# Patient Record
Sex: FEMALE | Race: WHITE | NOT HISPANIC OR LATINO | URBAN - METROPOLITAN AREA
[De-identification: names, ages, dates, MRNs, and addresses within clinical notes are randomized per-mention and may not be internally consistent; named-entity substitution may affect disease eponyms.]

---

## 2018-05-25 ENCOUNTER — HOSPITAL ENCOUNTER (OUTPATIENT)
Dept: RADIOLOGY | Facility: MEDICAL CENTER | Age: 58
End: 2018-05-25

## 2018-05-31 ENCOUNTER — APPOINTMENT (OUTPATIENT)
Dept: PHYSICAL MEDICINE AND REHAB | Facility: MEDICAL CENTER | Age: 58
End: 2018-05-31
Payer: MEDICAID

## 2018-06-14 ENCOUNTER — APPOINTMENT (OUTPATIENT)
Dept: PHYSICAL MEDICINE AND REHAB | Facility: MEDICAL CENTER | Age: 58
End: 2018-06-14
Payer: MEDICAID

## 2022-01-01 ENCOUNTER — PATIENT OUTREACH (OUTPATIENT)
Dept: ONCOLOGY | Facility: MEDICAL CENTER | Age: 62
End: 2022-01-01
Payer: MEDICAID

## 2022-01-01 ENCOUNTER — OUTPATIENT INFUSION SERVICES (OUTPATIENT)
Dept: ONCOLOGY | Facility: MEDICAL CENTER | Age: 62
End: 2022-01-01
Attending: INTERNAL MEDICINE
Payer: MEDICAID

## 2022-01-01 ENCOUNTER — HOSPITAL ENCOUNTER (OUTPATIENT)
Dept: RADIATION ONCOLOGY | Facility: MEDICAL CENTER | Age: 62
End: 2022-09-23
Payer: MEDICAID

## 2022-01-01 ENCOUNTER — HOSPITAL ENCOUNTER (OUTPATIENT)
Dept: RADIATION ONCOLOGY | Facility: MEDICAL CENTER | Age: 62
End: 2022-09-28
Payer: MEDICAID

## 2022-01-01 ENCOUNTER — TELEPHONE (OUTPATIENT)
Dept: OTHER | Facility: MEDICAL CENTER | Age: 62
End: 2022-01-01
Payer: MEDICAID

## 2022-01-01 ENCOUNTER — HOSPITAL ENCOUNTER (OUTPATIENT)
Facility: MEDICAL CENTER | Age: 62
End: 2022-08-08
Attending: INTERNAL MEDICINE | Admitting: INTERNAL MEDICINE
Payer: MEDICAID

## 2022-01-01 ENCOUNTER — PHARMACY VISIT (OUTPATIENT)
Dept: PHARMACY | Facility: MEDICAL CENTER | Age: 62
End: 2022-01-01
Payer: COMMERCIAL

## 2022-01-01 ENCOUNTER — DOCUMENTATION (OUTPATIENT)
Dept: RADIATION ONCOLOGY | Facility: MEDICAL CENTER | Age: 62
End: 2022-01-01
Payer: MEDICAID

## 2022-01-01 ENCOUNTER — HOSPITAL ENCOUNTER (OUTPATIENT)
Dept: RADIATION ONCOLOGY | Facility: MEDICAL CENTER | Age: 62
End: 2022-09-26

## 2022-01-01 ENCOUNTER — HOSPITAL ENCOUNTER (OUTPATIENT)
Dept: RADIATION ONCOLOGY | Facility: MEDICAL CENTER | Age: 62
End: 2022-09-30
Attending: RADIOLOGY
Payer: MEDICAID

## 2022-01-01 ENCOUNTER — TELEPHONE (OUTPATIENT)
Dept: OTHER | Facility: MEDICAL CENTER | Age: 62
End: 2022-01-01

## 2022-01-01 ENCOUNTER — HOSPITAL ENCOUNTER (INPATIENT)
Facility: MEDICAL CENTER | Age: 62
LOS: 7 days | DRG: 175 | End: 2022-10-10
Attending: EMERGENCY MEDICINE | Admitting: INTERNAL MEDICINE
Payer: MEDICAID

## 2022-01-01 ENCOUNTER — HOSPITAL ENCOUNTER (OUTPATIENT)
Dept: RADIATION ONCOLOGY | Facility: MEDICAL CENTER | Age: 62
End: 2022-09-15
Payer: MEDICAID

## 2022-01-01 ENCOUNTER — APPOINTMENT (OUTPATIENT)
Dept: RADIOLOGY | Facility: MEDICAL CENTER | Age: 62
DRG: 180 | End: 2022-01-01
Attending: INTERNAL MEDICINE
Payer: MEDICAID

## 2022-01-01 ENCOUNTER — HOSPITAL ENCOUNTER (OUTPATIENT)
Dept: RADIOLOGY | Facility: MEDICAL CENTER | Age: 62
End: 2022-08-06

## 2022-01-01 ENCOUNTER — HOSPITAL ENCOUNTER (OUTPATIENT)
Dept: RADIATION ONCOLOGY | Facility: MEDICAL CENTER | Age: 62
End: 2022-10-10

## 2022-01-01 ENCOUNTER — DOCUMENTATION (OUTPATIENT)
Dept: ONCOLOGY | Facility: MEDICAL CENTER | Age: 62
End: 2022-01-01
Payer: MEDICAID

## 2022-01-01 ENCOUNTER — HOSPITAL ENCOUNTER (OUTPATIENT)
Dept: RADIATION ONCOLOGY | Facility: MEDICAL CENTER | Age: 62
End: 2022-09-02
Payer: MEDICAID

## 2022-01-01 ENCOUNTER — PATIENT OUTREACH (OUTPATIENT)
Dept: SCHEDULING | Facility: IMAGING CENTER | Age: 62
End: 2022-01-01
Payer: MEDICAID

## 2022-01-01 ENCOUNTER — HOSPITAL ENCOUNTER (OUTPATIENT)
Dept: RADIATION ONCOLOGY | Facility: MEDICAL CENTER | Age: 62
End: 2022-09-29
Payer: MEDICAID

## 2022-01-01 ENCOUNTER — HOSPITAL ENCOUNTER (INPATIENT)
Facility: MEDICAL CENTER | Age: 62
LOS: 4 days | DRG: 193 | End: 2022-08-16
Attending: INTERNAL MEDICINE | Admitting: STUDENT IN AN ORGANIZED HEALTH CARE EDUCATION/TRAINING PROGRAM
Payer: MEDICAID

## 2022-01-01 ENCOUNTER — APPOINTMENT (OUTPATIENT)
Dept: RADIATION ONCOLOGY | Facility: MEDICAL CENTER | Age: 62
End: 2022-01-01
Attending: RADIOLOGY
Payer: MEDICAID

## 2022-01-01 ENCOUNTER — HOSPITAL ENCOUNTER (OUTPATIENT)
Dept: RADIATION ONCOLOGY | Facility: MEDICAL CENTER | Age: 62
End: 2022-09-06
Payer: MEDICAID

## 2022-01-01 ENCOUNTER — HOSPITAL ENCOUNTER (OUTPATIENT)
Dept: RADIATION ONCOLOGY | Facility: MEDICAL CENTER | Age: 62
End: 2022-08-30
Payer: MEDICAID

## 2022-01-01 ENCOUNTER — ANESTHESIA EVENT (OUTPATIENT)
Dept: SURGERY | Facility: MEDICAL CENTER | Age: 62
DRG: 180 | End: 2022-01-01
Payer: MEDICAID

## 2022-01-01 ENCOUNTER — HOSPITAL ENCOUNTER (OUTPATIENT)
Dept: RADIATION ONCOLOGY | Facility: MEDICAL CENTER | Age: 62
End: 2022-09-16
Payer: MEDICAID

## 2022-01-01 ENCOUNTER — HOSPITAL ENCOUNTER (OUTPATIENT)
Dept: RADIATION ONCOLOGY | Facility: MEDICAL CENTER | Age: 62
End: 2022-08-31
Attending: RADIOLOGY
Payer: MEDICAID

## 2022-01-01 ENCOUNTER — HOSPITAL ENCOUNTER (OUTPATIENT)
Dept: RADIATION ONCOLOGY | Facility: MEDICAL CENTER | Age: 62
End: 2022-08-31

## 2022-01-01 ENCOUNTER — APPOINTMENT (OUTPATIENT)
Dept: RADIOLOGY | Facility: MEDICAL CENTER | Age: 62
DRG: 175 | End: 2022-01-01
Attending: EMERGENCY MEDICINE
Payer: MEDICAID

## 2022-01-01 ENCOUNTER — HOSPITAL ENCOUNTER (OUTPATIENT)
Dept: RADIATION ONCOLOGY | Facility: MEDICAL CENTER | Age: 62
End: 2022-09-21
Payer: MEDICAID

## 2022-01-01 ENCOUNTER — HOSPITAL ENCOUNTER (EMERGENCY)
Facility: MEDICAL CENTER | Age: 62
End: 2022-01-01
Payer: MEDICAID

## 2022-01-01 ENCOUNTER — APPOINTMENT (OUTPATIENT)
Dept: RADIOLOGY | Facility: MEDICAL CENTER | Age: 62
DRG: 175 | End: 2022-01-01
Attending: INTERNAL MEDICINE
Payer: MEDICAID

## 2022-01-01 ENCOUNTER — APPOINTMENT (OUTPATIENT)
Dept: RADIOLOGY | Facility: MEDICAL CENTER | Age: 62
End: 2022-01-01
Attending: INTERNAL MEDICINE
Payer: MEDICAID

## 2022-01-01 ENCOUNTER — HOSPITAL ENCOUNTER (OUTPATIENT)
Dept: RADIATION ONCOLOGY | Facility: MEDICAL CENTER | Age: 62
End: 2022-08-29

## 2022-01-01 ENCOUNTER — HOSPITAL ENCOUNTER (OUTPATIENT)
Dept: RADIATION ONCOLOGY | Facility: MEDICAL CENTER | Age: 62
End: 2022-10-05

## 2022-01-01 ENCOUNTER — HOSPITAL ENCOUNTER (OUTPATIENT)
Dept: RADIATION ONCOLOGY | Facility: MEDICAL CENTER | Age: 62
End: 2022-09-30
Payer: MEDICAID

## 2022-01-01 ENCOUNTER — APPOINTMENT (OUTPATIENT)
Dept: RADIOLOGY | Facility: MEDICAL CENTER | Age: 62
DRG: 193 | End: 2022-01-01
Attending: INTERNAL MEDICINE
Payer: MEDICAID

## 2022-01-01 ENCOUNTER — HOSPITAL ENCOUNTER (OUTPATIENT)
Dept: RADIATION ONCOLOGY | Facility: MEDICAL CENTER | Age: 62
End: 2022-09-19

## 2022-01-01 ENCOUNTER — APPOINTMENT (OUTPATIENT)
Dept: RADIOLOGY | Facility: MEDICAL CENTER | Age: 62
DRG: 180 | End: 2022-01-01
Attending: FAMILY MEDICINE
Payer: MEDICAID

## 2022-01-01 ENCOUNTER — HOSPITAL ENCOUNTER (OUTPATIENT)
Dept: RADIATION ONCOLOGY | Facility: MEDICAL CENTER | Age: 62
End: 2022-09-08
Payer: MEDICAID

## 2022-01-01 ENCOUNTER — HOSPITAL ENCOUNTER (OUTPATIENT)
Dept: RADIATION ONCOLOGY | Facility: MEDICAL CENTER | Age: 62
End: 2022-09-20
Payer: MEDICAID

## 2022-01-01 ENCOUNTER — TELEPHONE (OUTPATIENT)
Dept: SLEEP MEDICINE | Facility: MEDICAL CENTER | Age: 62
End: 2022-01-01
Payer: MEDICAID

## 2022-01-01 ENCOUNTER — HOSPITAL ENCOUNTER (OUTPATIENT)
Dept: RADIATION ONCOLOGY | Facility: MEDICAL CENTER | Age: 62
End: 2022-09-01

## 2022-01-01 ENCOUNTER — HOSPITAL ENCOUNTER (INPATIENT)
Facility: MEDICAL CENTER | Age: 62
LOS: 4 days | DRG: 180 | End: 2022-08-12
Attending: INTERNAL MEDICINE | Admitting: FAMILY MEDICINE
Payer: MEDICAID

## 2022-01-01 ENCOUNTER — HOSPITAL ENCOUNTER (OUTPATIENT)
Dept: RADIATION ONCOLOGY | Facility: MEDICAL CENTER | Age: 62
End: 2022-09-09
Payer: MEDICAID

## 2022-01-01 ENCOUNTER — APPOINTMENT (OUTPATIENT)
Dept: CARDIOLOGY | Facility: MEDICAL CENTER | Age: 62
DRG: 180 | End: 2022-01-01
Attending: INTERNAL MEDICINE
Payer: MEDICAID

## 2022-01-01 ENCOUNTER — HOSPITAL ENCOUNTER (OUTPATIENT)
Dept: RADIATION ONCOLOGY | Facility: MEDICAL CENTER | Age: 62
End: 2022-09-06

## 2022-01-01 ENCOUNTER — HOSPITAL ENCOUNTER (OUTPATIENT)
Dept: RADIATION ONCOLOGY | Facility: MEDICAL CENTER | Age: 62
End: 2022-10-03

## 2022-01-01 ENCOUNTER — HOSPITAL ENCOUNTER (OUTPATIENT)
Dept: RADIATION ONCOLOGY | Facility: MEDICAL CENTER | Age: 62
End: 2022-09-14
Payer: MEDICAID

## 2022-01-01 ENCOUNTER — PATIENT OUTREACH (OUTPATIENT)
Dept: ONCOLOGY | Facility: MEDICAL CENTER | Age: 62
End: 2022-01-01

## 2022-01-01 ENCOUNTER — HOSPITAL ENCOUNTER (OUTPATIENT)
Dept: RADIATION ONCOLOGY | Facility: MEDICAL CENTER | Age: 62
End: 2022-09-07
Payer: MEDICAID

## 2022-01-01 ENCOUNTER — ANESTHESIA (OUTPATIENT)
Dept: SURGERY | Facility: MEDICAL CENTER | Age: 62
DRG: 180 | End: 2022-01-01
Payer: MEDICAID

## 2022-01-01 ENCOUNTER — HOSPITAL ENCOUNTER (OUTPATIENT)
Dept: RADIATION ONCOLOGY | Facility: MEDICAL CENTER | Age: 62
End: 2022-09-13
Payer: MEDICAID

## 2022-01-01 ENCOUNTER — HOSPITAL ENCOUNTER (OUTPATIENT)
Dept: RADIATION ONCOLOGY | Facility: MEDICAL CENTER | Age: 62
End: 2022-09-12

## 2022-01-01 ENCOUNTER — HOSPITAL ENCOUNTER (OUTPATIENT)
Dept: RADIATION ONCOLOGY | Facility: MEDICAL CENTER | Age: 62
End: 2022-10-31
Attending: RADIOLOGY
Payer: MEDICAID

## 2022-01-01 ENCOUNTER — APPOINTMENT (OUTPATIENT)
Dept: HEMATOLOGY ONCOLOGY | Facility: MEDICAL CENTER | Age: 62
End: 2022-01-01
Payer: MEDICAID

## 2022-01-01 ENCOUNTER — APPOINTMENT (OUTPATIENT)
Dept: CARDIOLOGY | Facility: MEDICAL CENTER | Age: 62
DRG: 175 | End: 2022-01-01
Attending: HOSPITALIST
Payer: MEDICAID

## 2022-01-01 ENCOUNTER — APPOINTMENT (OUTPATIENT)
Dept: RADIOLOGY | Facility: MEDICAL CENTER | Age: 62
DRG: 175 | End: 2022-01-01
Attending: STUDENT IN AN ORGANIZED HEALTH CARE EDUCATION/TRAINING PROGRAM
Payer: MEDICAID

## 2022-01-01 ENCOUNTER — HOSPITAL ENCOUNTER (OUTPATIENT)
Dept: RADIATION ONCOLOGY | Facility: MEDICAL CENTER | Age: 62
End: 2022-09-22
Payer: MEDICAID

## 2022-01-01 ENCOUNTER — APPOINTMENT (OUTPATIENT)
Dept: CARDIOLOGY | Facility: MEDICAL CENTER | Age: 62
DRG: 175 | End: 2022-01-01
Attending: INTERNAL MEDICINE
Payer: MEDICAID

## 2022-01-01 ENCOUNTER — HOSPITAL ENCOUNTER (OUTPATIENT)
Dept: RADIATION ONCOLOGY | Facility: MEDICAL CENTER | Age: 62
End: 2022-09-27
Payer: MEDICAID

## 2022-01-01 ENCOUNTER — HOSPITAL ENCOUNTER (OUTPATIENT)
Dept: RADIATION ONCOLOGY | Facility: MEDICAL CENTER | Age: 62
End: 2022-08-16
Payer: MEDICAID

## 2022-01-01 VITALS
RESPIRATION RATE: 20 BRPM | OXYGEN SATURATION: 95 % | BODY MASS INDEX: 32.56 KG/M2 | WEIGHT: 202.6 LBS | DIASTOLIC BLOOD PRESSURE: 75 MMHG | HEIGHT: 66 IN | TEMPERATURE: 96.5 F | SYSTOLIC BLOOD PRESSURE: 116 MMHG | HEART RATE: 75 BPM

## 2022-01-01 VITALS
RESPIRATION RATE: 18 BRPM | SYSTOLIC BLOOD PRESSURE: 134 MMHG | WEIGHT: 198.41 LBS | HEART RATE: 81 BPM | OXYGEN SATURATION: 95 % | TEMPERATURE: 98.9 F | BODY MASS INDEX: 31.08 KG/M2 | DIASTOLIC BLOOD PRESSURE: 93 MMHG

## 2022-01-01 VITALS
BODY MASS INDEX: 33.06 KG/M2 | SYSTOLIC BLOOD PRESSURE: 130 MMHG | HEART RATE: 71 BPM | TEMPERATURE: 98.1 F | WEIGHT: 205.69 LBS | HEIGHT: 66 IN | RESPIRATION RATE: 18 BRPM | DIASTOLIC BLOOD PRESSURE: 71 MMHG

## 2022-01-01 VITALS — WEIGHT: 210 LBS | BODY MASS INDEX: 32.89 KG/M2

## 2022-01-01 VITALS — OXYGEN SATURATION: 93 % | DIASTOLIC BLOOD PRESSURE: 53 MMHG | HEART RATE: 69 BPM | SYSTOLIC BLOOD PRESSURE: 90 MMHG

## 2022-01-01 VITALS
BODY MASS INDEX: 32.01 KG/M2 | HEIGHT: 67 IN | SYSTOLIC BLOOD PRESSURE: 110 MMHG | TEMPERATURE: 97.5 F | HEART RATE: 96 BPM | DIASTOLIC BLOOD PRESSURE: 74 MMHG | OXYGEN SATURATION: 91 % | WEIGHT: 203.93 LBS | RESPIRATION RATE: 18 BRPM

## 2022-01-01 VITALS — OXYGEN SATURATION: 95 % | RESPIRATION RATE: 34 BRPM | HEART RATE: 84 BPM

## 2022-01-01 VITALS
SYSTOLIC BLOOD PRESSURE: 123 MMHG | WEIGHT: 212.96 LBS | OXYGEN SATURATION: 92 % | HEIGHT: 66 IN | RESPIRATION RATE: 20 BRPM | TEMPERATURE: 97.7 F | DIASTOLIC BLOOD PRESSURE: 75 MMHG | BODY MASS INDEX: 34.23 KG/M2 | HEART RATE: 66 BPM

## 2022-01-01 VITALS
TEMPERATURE: 98.1 F | RESPIRATION RATE: 20 BRPM | HEART RATE: 94 BPM | OXYGEN SATURATION: 96 % | BODY MASS INDEX: 31.25 KG/M2 | DIASTOLIC BLOOD PRESSURE: 72 MMHG | HEIGHT: 67 IN | SYSTOLIC BLOOD PRESSURE: 96 MMHG | WEIGHT: 199.08 LBS

## 2022-01-01 VITALS
SYSTOLIC BLOOD PRESSURE: 114 MMHG | TEMPERATURE: 96.7 F | BODY MASS INDEX: 31.66 KG/M2 | HEIGHT: 67 IN | WEIGHT: 201.72 LBS | DIASTOLIC BLOOD PRESSURE: 61 MMHG | OXYGEN SATURATION: 97 % | HEART RATE: 80 BPM | RESPIRATION RATE: 18 BRPM

## 2022-01-01 VITALS — RESPIRATION RATE: 15 BRPM | OXYGEN SATURATION: 93 % | HEART RATE: 89 BPM

## 2022-01-01 VITALS — OXYGEN SATURATION: 95 % | RESPIRATION RATE: 27 BRPM | HEART RATE: 66 BPM

## 2022-01-01 VITALS
SYSTOLIC BLOOD PRESSURE: 120 MMHG | HEIGHT: 68 IN | DIASTOLIC BLOOD PRESSURE: 91 MMHG | OXYGEN SATURATION: 90 % | WEIGHT: 199.74 LBS | RESPIRATION RATE: 18 BRPM | BODY MASS INDEX: 30.27 KG/M2 | TEMPERATURE: 98.1 F | HEART RATE: 71 BPM

## 2022-01-01 VITALS — DIASTOLIC BLOOD PRESSURE: 65 MMHG | HEART RATE: 134 BPM | OXYGEN SATURATION: 89 % | SYSTOLIC BLOOD PRESSURE: 99 MMHG

## 2022-01-01 VITALS — DIASTOLIC BLOOD PRESSURE: 71 MMHG | SYSTOLIC BLOOD PRESSURE: 106 MMHG | HEART RATE: 90 BPM | OXYGEN SATURATION: 94 %

## 2022-01-01 VITALS — HEART RATE: 58 BPM | OXYGEN SATURATION: 94 %

## 2022-01-01 VITALS — DIASTOLIC BLOOD PRESSURE: 80 MMHG | SYSTOLIC BLOOD PRESSURE: 132 MMHG | HEART RATE: 73 BPM | OXYGEN SATURATION: 91 %

## 2022-01-01 VITALS — OXYGEN SATURATION: 87 %

## 2022-01-01 VITALS — DIASTOLIC BLOOD PRESSURE: 106 MMHG | SYSTOLIC BLOOD PRESSURE: 154 MMHG | OXYGEN SATURATION: 97 % | HEART RATE: 55 BPM

## 2022-01-01 VITALS — HEART RATE: 69 BPM | RESPIRATION RATE: 26 BRPM | OXYGEN SATURATION: 93 %

## 2022-01-01 VITALS — RESPIRATION RATE: 30 BRPM | OXYGEN SATURATION: 93 % | HEART RATE: 79 BPM

## 2022-01-01 VITALS — HEART RATE: 70 BPM | RESPIRATION RATE: 20 BRPM

## 2022-01-01 DIAGNOSIS — I82.4Z2 ACUTE DEEP VEIN THROMBOSIS (DVT) OF DISTAL VEIN OF LEFT LOWER EXTREMITY (HCC): ICD-10-CM

## 2022-01-01 DIAGNOSIS — C34.92 PRIMARY LUNG SQUAMOUS CELL CARCINOMA, LEFT (HCC): ICD-10-CM

## 2022-01-01 DIAGNOSIS — J44.1 CHRONIC OBSTRUCTIVE PULMONARY DISEASE WITH ACUTE EXACERBATION (HCC): ICD-10-CM

## 2022-01-01 DIAGNOSIS — J96.01 ACUTE RESPIRATORY FAILURE WITH HYPOXIA (HCC): ICD-10-CM

## 2022-01-01 DIAGNOSIS — C34.12 MALIGNANT NEOPLASM OF UPPER LOBE OF LEFT LUNG (HCC): ICD-10-CM

## 2022-01-01 DIAGNOSIS — J44.9 CHRONIC OBSTRUCTIVE PULMONARY DISEASE, UNSPECIFIED COPD TYPE (HCC): ICD-10-CM

## 2022-01-01 DIAGNOSIS — J96.01 ACUTE HYPOXEMIC RESPIRATORY FAILURE (HCC): ICD-10-CM

## 2022-01-01 DIAGNOSIS — I26.09 OTHER ACUTE PULMONARY EMBOLISM WITH ACUTE COR PULMONALE (HCC): ICD-10-CM

## 2022-01-01 DIAGNOSIS — I50.20 HFREF (HEART FAILURE WITH REDUCED EJECTION FRACTION) (HCC): ICD-10-CM

## 2022-01-01 DIAGNOSIS — I27.20 PULMONARY HYPERTENSION (HCC): ICD-10-CM

## 2022-01-01 DIAGNOSIS — G89.3 CANCER-RELATED PAIN: ICD-10-CM

## 2022-01-01 DIAGNOSIS — J90 PLEURAL EFFUSION: ICD-10-CM

## 2022-01-01 DIAGNOSIS — J44.1 ACUTE EXACERBATION OF CHRONIC OBSTRUCTIVE PULMONARY DISEASE (COPD) (HCC): ICD-10-CM

## 2022-01-01 DIAGNOSIS — J18.9 COMMUNITY ACQUIRED PNEUMONIA, UNSPECIFIED LATERALITY: ICD-10-CM

## 2022-01-01 DIAGNOSIS — I48.91 ATRIAL FIBRILLATION WITH RVR (HCC): ICD-10-CM

## 2022-01-01 DIAGNOSIS — J44.1 COPD WITH EXACERBATION (HCC): ICD-10-CM

## 2022-01-01 DIAGNOSIS — I48.0 PAROXYSMAL ATRIAL FIBRILLATION (HCC): ICD-10-CM

## 2022-01-01 DIAGNOSIS — R91.8 LUNG MASS: ICD-10-CM

## 2022-01-01 DIAGNOSIS — I48.0 PAROXYSMAL ATRIAL FIBRILLATION (HCC): Primary | ICD-10-CM

## 2022-01-01 DIAGNOSIS — I48.91 ATRIAL FIBRILLATION, UNSPECIFIED TYPE (HCC): ICD-10-CM

## 2022-01-01 DIAGNOSIS — I26.99 ACUTE PULMONARY EMBOLISM WITHOUT ACUTE COR PULMONALE, UNSPECIFIED PULMONARY EMBOLISM TYPE (HCC): ICD-10-CM

## 2022-01-01 DIAGNOSIS — I26.99 PULMONARY INFARCT (HCC): ICD-10-CM

## 2022-01-01 DIAGNOSIS — I70.90 ATHEROSCLEROSIS: ICD-10-CM

## 2022-01-01 DIAGNOSIS — C34.92 NSCLC OF LEFT LUNG (HCC): ICD-10-CM

## 2022-01-01 LAB
ALBUMIN SERPL BCP-MCNC: 3.2 G/DL (ref 3.2–4.9)
ALBUMIN SERPL BCP-MCNC: 3.4 G/DL (ref 3.2–4.9)
ALBUMIN SERPL BCP-MCNC: 3.5 G/DL (ref 3.2–4.9)
ALBUMIN SERPL BCP-MCNC: 3.6 G/DL (ref 3.2–4.9)
ALBUMIN SERPL BCP-MCNC: 3.7 G/DL (ref 3.2–4.9)
ALBUMIN SERPL BCP-MCNC: 4 G/DL (ref 3.2–4.9)
ALBUMIN/GLOB SERPL: 0.9 G/DL
ALBUMIN/GLOB SERPL: 0.9 G/DL
ALBUMIN/GLOB SERPL: 1.2 G/DL
ALBUMIN/GLOB SERPL: 1.3 G/DL
ALBUMIN/GLOB SERPL: 1.4 G/DL
ALBUMIN/GLOB SERPL: 1.4 G/DL
ALBUMIN/GLOB SERPL: 1.8 G/DL
ALP SERPL-CCNC: 103 U/L (ref 30–99)
ALP SERPL-CCNC: 269 U/L (ref 30–99)
ALP SERPL-CCNC: 38 U/L (ref 30–99)
ALP SERPL-CCNC: 45 U/L (ref 30–99)
ALP SERPL-CCNC: 51 U/L (ref 30–99)
ALP SERPL-CCNC: 74 U/L (ref 30–99)
ALP SERPL-CCNC: 79 U/L (ref 30–99)
ALT SERPL-CCNC: 15 U/L (ref 2–50)
ALT SERPL-CCNC: 16 U/L (ref 2–50)
ALT SERPL-CCNC: 16 U/L (ref 2–50)
ALT SERPL-CCNC: 26 U/L (ref 2–50)
ALT SERPL-CCNC: 39 U/L (ref 2–50)
ALT SERPL-CCNC: 9 U/L (ref 2–50)
ALT SERPL-CCNC: 9 U/L (ref 2–50)
ANION GAP SERPL CALC-SCNC: 10 MMOL/L (ref 7–16)
ANION GAP SERPL CALC-SCNC: 11 MMOL/L (ref 7–16)
ANION GAP SERPL CALC-SCNC: 11 MMOL/L (ref 7–16)
ANION GAP SERPL CALC-SCNC: 12 MMOL/L (ref 7–16)
ANION GAP SERPL CALC-SCNC: 13 MMOL/L (ref 7–16)
ANION GAP SERPL CALC-SCNC: 14 MMOL/L (ref 7–16)
ANION GAP SERPL CALC-SCNC: 15 MMOL/L (ref 7–16)
ANION GAP SERPL CALC-SCNC: 17 MMOL/L (ref 7–16)
ANION GAP SERPL CALC-SCNC: 8 MMOL/L (ref 7–16)
ANION GAP SERPL CALC-SCNC: 9 MMOL/L (ref 7–16)
ANISOCYTOSIS BLD QL SMEAR: ABNORMAL
ANISOCYTOSIS BLD QL SMEAR: ABNORMAL
APPEARANCE FLD: NORMAL
APTT PPP: 22.6 SEC (ref 24.7–36)
AST SERPL-CCNC: 10 U/L (ref 12–45)
AST SERPL-CCNC: 11 U/L (ref 12–45)
AST SERPL-CCNC: 11 U/L (ref 12–45)
AST SERPL-CCNC: 13 U/L (ref 12–45)
AST SERPL-CCNC: 14 U/L (ref 12–45)
AST SERPL-CCNC: 14 U/L (ref 12–45)
AST SERPL-CCNC: 44 U/L (ref 12–45)
BACTERIA SPEC RESP CULT: NORMAL
BACTERIA SPEC RESP CULT: NORMAL
BASOPHILS # BLD AUTO: 0 % (ref 0–1.8)
BASOPHILS # BLD AUTO: 0 % (ref 0–1.8)
BASOPHILS # BLD AUTO: 0.1 % (ref 0–1.8)
BASOPHILS # BLD AUTO: 0.1 % (ref 0–1.8)
BASOPHILS # BLD AUTO: 0.2 % (ref 0–1.8)
BASOPHILS # BLD AUTO: 0.5 % (ref 0–1.8)
BASOPHILS # BLD AUTO: 0.7 % (ref 0–1.8)
BASOPHILS # BLD AUTO: 0.8 % (ref 0–1.8)
BASOPHILS # BLD AUTO: 0.9 % (ref 0–1.8)
BASOPHILS # BLD AUTO: 2.1 % (ref 0–1.8)
BASOPHILS # BLD: 0 K/UL (ref 0–0.12)
BASOPHILS # BLD: 0 K/UL (ref 0–0.12)
BASOPHILS # BLD: 0.01 K/UL (ref 0–0.12)
BASOPHILS # BLD: 0.02 K/UL (ref 0–0.12)
BASOPHILS # BLD: 0.02 K/UL (ref 0–0.12)
BASOPHILS # BLD: 0.03 K/UL (ref 0–0.12)
BASOPHILS # BLD: 0.03 K/UL (ref 0–0.12)
BASOPHILS # BLD: 0.04 K/UL (ref 0–0.12)
BASOPHILS # BLD: 0.05 K/UL (ref 0–0.12)
BILIRUB SERPL-MCNC: 0.3 MG/DL (ref 0.1–1.5)
BILIRUB SERPL-MCNC: 0.3 MG/DL (ref 0.1–1.5)
BILIRUB SERPL-MCNC: 0.5 MG/DL (ref 0.1–1.5)
BILIRUB SERPL-MCNC: 0.7 MG/DL (ref 0.1–1.5)
BILIRUB SERPL-MCNC: 0.7 MG/DL (ref 0.1–1.5)
BILIRUB SERPL-MCNC: 1.9 MG/DL (ref 0.1–1.5)
BILIRUB SERPL-MCNC: 2 MG/DL (ref 0.1–1.5)
BODY FLD TYPE: NORMAL
BUN SERPL-MCNC: 14 MG/DL (ref 8–22)
BUN SERPL-MCNC: 14 MG/DL (ref 8–22)
BUN SERPL-MCNC: 15 MG/DL (ref 8–22)
BUN SERPL-MCNC: 17 MG/DL (ref 8–22)
BUN SERPL-MCNC: 18 MG/DL (ref 8–22)
BUN SERPL-MCNC: 19 MG/DL (ref 8–22)
BUN SERPL-MCNC: 21 MG/DL (ref 8–22)
BUN SERPL-MCNC: 21 MG/DL (ref 8–22)
BUN SERPL-MCNC: 22 MG/DL (ref 8–22)
BUN SERPL-MCNC: 23 MG/DL (ref 8–22)
BUN SERPL-MCNC: 23 MG/DL (ref 8–22)
BUN SERPL-MCNC: 24 MG/DL (ref 8–22)
BUN SERPL-MCNC: 24 MG/DL (ref 8–22)
BUN SERPL-MCNC: 25 MG/DL (ref 8–22)
BUN SERPL-MCNC: 26 MG/DL (ref 8–22)
BUN SERPL-MCNC: 26 MG/DL (ref 8–22)
BURR CELLS BLD QL SMEAR: NORMAL
BURR CELLS BLD QL SMEAR: NORMAL
CALCIUM SERPL-MCNC: 7.9 MG/DL (ref 8.5–10.5)
CALCIUM SERPL-MCNC: 8 MG/DL (ref 8.4–10.2)
CALCIUM SERPL-MCNC: 8.2 MG/DL (ref 8.4–10.2)
CALCIUM SERPL-MCNC: 8.2 MG/DL (ref 8.5–10.5)
CALCIUM SERPL-MCNC: 8.3 MG/DL (ref 8.5–10.5)
CALCIUM SERPL-MCNC: 8.4 MG/DL (ref 8.4–10.2)
CALCIUM SERPL-MCNC: 8.5 MG/DL (ref 8.5–10.5)
CALCIUM SERPL-MCNC: 8.5 MG/DL (ref 8.5–10.5)
CALCIUM SERPL-MCNC: 8.6 MG/DL (ref 8.5–10.5)
CALCIUM SERPL-MCNC: 8.7 MG/DL (ref 8.5–10.5)
CALCIUM SERPL-MCNC: 8.7 MG/DL (ref 8.5–10.5)
CALCIUM SERPL-MCNC: 8.8 MG/DL (ref 8.5–10.5)
CALCIUM SERPL-MCNC: 8.9 MG/DL (ref 8.5–10.5)
CALCIUM SERPL-MCNC: 9 MG/DL (ref 8.5–10.5)
CHEMOTHERAPY INFUSION START DATE: NORMAL
CHEMOTHERAPY INFUSION STOP DATE: NORMAL
CHEMOTHERAPY RECORDS: 2
CHEMOTHERAPY RECORDS: 6
CHEMOTHERAPY RECORDS: 6
CHEMOTHERAPY RECORDS: 600
CHEMOTHERAPY RECORDS: 600
CHEMOTHERAPY RECORDS: 6000
CHEMOTHERAPY RECORDS: NORMAL
CHEMOTHERAPY RX CANCER: NORMAL
CHLORIDE SERPL-SCNC: 100 MMOL/L (ref 96–112)
CHLORIDE SERPL-SCNC: 101 MMOL/L (ref 96–112)
CHLORIDE SERPL-SCNC: 102 MMOL/L (ref 96–112)
CHLORIDE SERPL-SCNC: 103 MMOL/L (ref 96–112)
CHLORIDE SERPL-SCNC: 96 MMOL/L (ref 96–112)
CHLORIDE SERPL-SCNC: 96 MMOL/L (ref 96–112)
CHLORIDE SERPL-SCNC: 97 MMOL/L (ref 96–112)
CHLORIDE SERPL-SCNC: 98 MMOL/L (ref 96–112)
CHLORIDE SERPL-SCNC: 98 MMOL/L (ref 96–112)
CHLORIDE SERPL-SCNC: 99 MMOL/L (ref 96–112)
CHOLEST SERPL-MCNC: 143 MG/DL (ref 100–199)
CO2 SERPL-SCNC: 19 MMOL/L (ref 20–33)
CO2 SERPL-SCNC: 19 MMOL/L (ref 20–33)
CO2 SERPL-SCNC: 20 MMOL/L (ref 20–33)
CO2 SERPL-SCNC: 21 MMOL/L (ref 20–33)
CO2 SERPL-SCNC: 22 MMOL/L (ref 20–33)
CO2 SERPL-SCNC: 23 MMOL/L (ref 20–33)
CO2 SERPL-SCNC: 23 MMOL/L (ref 20–33)
CO2 SERPL-SCNC: 25 MMOL/L (ref 20–33)
CO2 SERPL-SCNC: 26 MMOL/L (ref 20–33)
CO2 SERPL-SCNC: 26 MMOL/L (ref 20–33)
COLOR FLD: NORMAL
CREAT SERPL-MCNC: 0.51 MG/DL (ref 0.5–1.4)
CREAT SERPL-MCNC: 0.52 MG/DL (ref 0.5–1.4)
CREAT SERPL-MCNC: 0.52 MG/DL (ref 0.5–1.4)
CREAT SERPL-MCNC: 0.53 MG/DL (ref 0.5–1.4)
CREAT SERPL-MCNC: 0.55 MG/DL (ref 0.5–1.4)
CREAT SERPL-MCNC: 0.56 MG/DL (ref 0.5–1.4)
CREAT SERPL-MCNC: 0.57 MG/DL (ref 0.5–1.4)
CREAT SERPL-MCNC: 0.63 MG/DL (ref 0.5–1.4)
CREAT SERPL-MCNC: 0.64 MG/DL (ref 0.5–1.4)
CREAT SERPL-MCNC: 0.65 MG/DL (ref 0.5–1.4)
CREAT SERPL-MCNC: 0.76 MG/DL (ref 0.5–1.4)
CREAT SERPL-MCNC: 0.77 MG/DL (ref 0.5–1.4)
CREAT SERPL-MCNC: 0.81 MG/DL (ref 0.5–1.4)
CREAT SERPL-MCNC: 0.82 MG/DL (ref 0.5–1.4)
CREAT SERPL-MCNC: 0.86 MG/DL (ref 0.5–1.4)
CREAT SERPL-MCNC: 0.91 MG/DL (ref 0.5–1.4)
CREAT SERPL-MCNC: 0.98 MG/DL (ref 0.5–1.4)
CREAT SERPL-MCNC: 1.03 MG/DL (ref 0.5–1.4)
CYTOLOGY REG CYTOL: NORMAL
DATE 1ST CHEMO CANCER: NORMAL
EKG IMPRESSION: NORMAL
EOSINOPHIL # BLD AUTO: 0 K/UL (ref 0–0.51)
EOSINOPHIL # BLD AUTO: 0.01 K/UL (ref 0–0.51)
EOSINOPHIL # BLD AUTO: 0.04 K/UL (ref 0–0.51)
EOSINOPHIL # BLD AUTO: 0.05 K/UL (ref 0–0.51)
EOSINOPHIL # BLD AUTO: 0.14 K/UL (ref 0–0.51)
EOSINOPHIL NFR BLD: 0 % (ref 0–6.9)
EOSINOPHIL NFR BLD: 0.1 % (ref 0–6.9)
EOSINOPHIL NFR BLD: 0.4 % (ref 0–6.9)
EOSINOPHIL NFR BLD: 0.8 % (ref 0–6.9)
EOSINOPHIL NFR BLD: 1.9 % (ref 0–6.9)
ERYTHROCYTE [DISTWIDTH] IN BLOOD BY AUTOMATED COUNT: 43 FL (ref 35.9–50)
ERYTHROCYTE [DISTWIDTH] IN BLOOD BY AUTOMATED COUNT: 43.4 FL (ref 35.9–50)
ERYTHROCYTE [DISTWIDTH] IN BLOOD BY AUTOMATED COUNT: 43.9 FL (ref 35.9–50)
ERYTHROCYTE [DISTWIDTH] IN BLOOD BY AUTOMATED COUNT: 44.1 FL (ref 35.9–50)
ERYTHROCYTE [DISTWIDTH] IN BLOOD BY AUTOMATED COUNT: 45.3 FL (ref 35.9–50)
ERYTHROCYTE [DISTWIDTH] IN BLOOD BY AUTOMATED COUNT: 47.6 FL (ref 35.9–50)
ERYTHROCYTE [DISTWIDTH] IN BLOOD BY AUTOMATED COUNT: 47.8 FL (ref 35.9–50)
ERYTHROCYTE [DISTWIDTH] IN BLOOD BY AUTOMATED COUNT: 48.2 FL (ref 35.9–50)
ERYTHROCYTE [DISTWIDTH] IN BLOOD BY AUTOMATED COUNT: 48.4 FL (ref 35.9–50)
ERYTHROCYTE [DISTWIDTH] IN BLOOD BY AUTOMATED COUNT: 52.1 FL (ref 35.9–50)
ERYTHROCYTE [DISTWIDTH] IN BLOOD BY AUTOMATED COUNT: 52.6 FL (ref 35.9–50)
ERYTHROCYTE [DISTWIDTH] IN BLOOD BY AUTOMATED COUNT: 53.1 FL (ref 35.9–50)
ERYTHROCYTE [DISTWIDTH] IN BLOOD BY AUTOMATED COUNT: 55.1 FL (ref 35.9–50)
ERYTHROCYTE [DISTWIDTH] IN BLOOD BY AUTOMATED COUNT: 56 FL (ref 35.9–50)
ERYTHROCYTE [DISTWIDTH] IN BLOOD BY AUTOMATED COUNT: 60.3 FL (ref 35.9–50)
ERYTHROCYTE [DISTWIDTH] IN BLOOD BY AUTOMATED COUNT: 61.4 FL (ref 35.9–50)
ERYTHROCYTE [DISTWIDTH] IN BLOOD BY AUTOMATED COUNT: 61.9 FL (ref 35.9–50)
EST. AVERAGE GLUCOSE BLD GHB EST-MCNC: 120 MG/DL
FUNGUS SPEC CULT: NORMAL
FUNGUS SPEC FUNGUS STN: NORMAL
FUNGUS SPEC FUNGUS STN: NORMAL
GFR SERPLBLD CREATININE-BSD FMLA CKD-EPI: 100 ML/MIN/1.73 M 2
GFR SERPLBLD CREATININE-BSD FMLA CKD-EPI: 103 ML/MIN/1.73 M 2
GFR SERPLBLD CREATININE-BSD FMLA CKD-EPI: 103 ML/MIN/1.73 M 2
GFR SERPLBLD CREATININE-BSD FMLA CKD-EPI: 104 ML/MIN/1.73 M 2
GFR SERPLBLD CREATININE-BSD FMLA CKD-EPI: 105 ML/MIN/1.73 M 2
GFR SERPLBLD CREATININE-BSD FMLA CKD-EPI: 106 ML/MIN/1.73 M 2
GFR SERPLBLD CREATININE-BSD FMLA CKD-EPI: 62 ML/MIN/1.73 M 2
GFR SERPLBLD CREATININE-BSD FMLA CKD-EPI: 65 ML/MIN/1.73 M 2
GFR SERPLBLD CREATININE-BSD FMLA CKD-EPI: 72 ML/MIN/1.73 M 2
GFR SERPLBLD CREATININE-BSD FMLA CKD-EPI: 77 ML/MIN/1.73 M 2
GFR SERPLBLD CREATININE-BSD FMLA CKD-EPI: 81 ML/MIN/1.73 M 2
GFR SERPLBLD CREATININE-BSD FMLA CKD-EPI: 82 ML/MIN/1.73 M 2
GFR SERPLBLD CREATININE-BSD FMLA CKD-EPI: 87 ML/MIN/1.73 M 2
GFR SERPLBLD CREATININE-BSD FMLA CKD-EPI: 89 ML/MIN/1.73 M 2
GLOBULIN SER CALC-MCNC: 2.1 G/DL (ref 1.9–3.5)
GLOBULIN SER CALC-MCNC: 2.5 G/DL (ref 1.9–3.5)
GLOBULIN SER CALC-MCNC: 2.8 G/DL (ref 1.9–3.5)
GLOBULIN SER CALC-MCNC: 2.8 G/DL (ref 1.9–3.5)
GLOBULIN SER CALC-MCNC: 2.9 G/DL (ref 1.9–3.5)
GLOBULIN SER CALC-MCNC: 3.4 G/DL (ref 1.9–3.5)
GLOBULIN SER CALC-MCNC: 3.9 G/DL (ref 1.9–3.5)
GLUCOSE BLD STRIP.AUTO-MCNC: 101 MG/DL (ref 65–99)
GLUCOSE BLD STRIP.AUTO-MCNC: 101 MG/DL (ref 65–99)
GLUCOSE BLD STRIP.AUTO-MCNC: 106 MG/DL (ref 65–99)
GLUCOSE BLD STRIP.AUTO-MCNC: 108 MG/DL (ref 65–99)
GLUCOSE BLD STRIP.AUTO-MCNC: 114 MG/DL (ref 65–99)
GLUCOSE BLD STRIP.AUTO-MCNC: 128 MG/DL (ref 65–99)
GLUCOSE BLD STRIP.AUTO-MCNC: 129 MG/DL (ref 65–99)
GLUCOSE BLD STRIP.AUTO-MCNC: 131 MG/DL (ref 65–99)
GLUCOSE BLD STRIP.AUTO-MCNC: 134 MG/DL (ref 65–99)
GLUCOSE BLD STRIP.AUTO-MCNC: 136 MG/DL (ref 65–99)
GLUCOSE BLD STRIP.AUTO-MCNC: 146 MG/DL (ref 65–99)
GLUCOSE BLD STRIP.AUTO-MCNC: 148 MG/DL (ref 65–99)
GLUCOSE BLD STRIP.AUTO-MCNC: 162 MG/DL (ref 65–99)
GLUCOSE BLD STRIP.AUTO-MCNC: 196 MG/DL (ref 65–99)
GLUCOSE BLD STRIP.AUTO-MCNC: 197 MG/DL (ref 65–99)
GLUCOSE BLD STRIP.AUTO-MCNC: 225 MG/DL (ref 65–99)
GLUCOSE BLD STRIP.AUTO-MCNC: 229 MG/DL (ref 65–99)
GLUCOSE BLD STRIP.AUTO-MCNC: 232 MG/DL (ref 65–99)
GLUCOSE BLD STRIP.AUTO-MCNC: 265 MG/DL (ref 65–99)
GLUCOSE BLD STRIP.AUTO-MCNC: 80 MG/DL (ref 65–99)
GLUCOSE BLD STRIP.AUTO-MCNC: 86 MG/DL (ref 65–99)
GLUCOSE BLD STRIP.AUTO-MCNC: 86 MG/DL (ref 65–99)
GLUCOSE BLD STRIP.AUTO-MCNC: 91 MG/DL (ref 65–99)
GLUCOSE BLD STRIP.AUTO-MCNC: 93 MG/DL (ref 65–99)
GLUCOSE BLD STRIP.AUTO-MCNC: 96 MG/DL (ref 65–99)
GLUCOSE BLD STRIP.AUTO-MCNC: 99 MG/DL (ref 65–99)
GLUCOSE SERPL-MCNC: 102 MG/DL (ref 65–99)
GLUCOSE SERPL-MCNC: 105 MG/DL (ref 65–99)
GLUCOSE SERPL-MCNC: 114 MG/DL (ref 65–99)
GLUCOSE SERPL-MCNC: 134 MG/DL (ref 65–99)
GLUCOSE SERPL-MCNC: 134 MG/DL (ref 65–99)
GLUCOSE SERPL-MCNC: 157 MG/DL (ref 65–99)
GLUCOSE SERPL-MCNC: 157 MG/DL (ref 65–99)
GLUCOSE SERPL-MCNC: 160 MG/DL (ref 65–99)
GLUCOSE SERPL-MCNC: 171 MG/DL (ref 65–99)
GLUCOSE SERPL-MCNC: 177 MG/DL (ref 65–99)
GLUCOSE SERPL-MCNC: 197 MG/DL (ref 65–99)
GLUCOSE SERPL-MCNC: 200 MG/DL (ref 65–99)
GLUCOSE SERPL-MCNC: 204 MG/DL (ref 65–99)
GLUCOSE SERPL-MCNC: 223 MG/DL (ref 65–99)
GLUCOSE SERPL-MCNC: 86 MG/DL (ref 65–99)
GLUCOSE SERPL-MCNC: 90 MG/DL (ref 65–99)
GRAM STN SPEC: NORMAL
HBA1C MFR BLD: 5.8 % (ref 4–5.6)
HCT VFR BLD AUTO: 26.9 % (ref 37–47)
HCT VFR BLD AUTO: 28.7 % (ref 37–47)
HCT VFR BLD AUTO: 28.7 % (ref 37–47)
HCT VFR BLD AUTO: 29.7 % (ref 37–47)
HCT VFR BLD AUTO: 29.9 % (ref 37–47)
HCT VFR BLD AUTO: 30 % (ref 37–47)
HCT VFR BLD AUTO: 30.4 % (ref 37–47)
HCT VFR BLD AUTO: 31.6 % (ref 37–47)
HCT VFR BLD AUTO: 33.2 % (ref 37–47)
HCT VFR BLD AUTO: 34.7 % (ref 37–47)
HCT VFR BLD AUTO: 39.3 % (ref 37–47)
HCT VFR BLD AUTO: 43 % (ref 37–47)
HCT VFR BLD AUTO: 43 % (ref 37–47)
HCT VFR BLD AUTO: 44.6 % (ref 37–47)
HCT VFR BLD AUTO: 44.8 % (ref 37–47)
HCT VFR BLD AUTO: 46.3 % (ref 37–47)
HCT VFR BLD AUTO: 47.1 % (ref 37–47)
HDLC SERPL-MCNC: 46 MG/DL
HGB BLD-MCNC: 10.2 G/DL (ref 12–16)
HGB BLD-MCNC: 10.3 G/DL (ref 12–16)
HGB BLD-MCNC: 11.4 G/DL (ref 12–16)
HGB BLD-MCNC: 11.5 G/DL (ref 12–16)
HGB BLD-MCNC: 13.2 G/DL (ref 12–16)
HGB BLD-MCNC: 14.2 G/DL (ref 12–16)
HGB BLD-MCNC: 14.3 G/DL (ref 12–16)
HGB BLD-MCNC: 15.1 G/DL (ref 12–16)
HGB BLD-MCNC: 15.1 G/DL (ref 12–16)
HGB BLD-MCNC: 15.7 G/DL (ref 12–16)
HGB BLD-MCNC: 15.8 G/DL (ref 12–16)
HGB BLD-MCNC: 8.9 G/DL (ref 12–16)
HGB BLD-MCNC: 9.5 G/DL (ref 12–16)
HGB BLD-MCNC: 9.7 G/DL (ref 12–16)
HGB BLD-MCNC: 9.7 G/DL (ref 12–16)
HGB BLD-MCNC: 9.8 G/DL (ref 12–16)
HGB BLD-MCNC: 9.9 G/DL (ref 12–16)
IMM GRANULOCYTES # BLD AUTO: 0.01 K/UL (ref 0–0.11)
IMM GRANULOCYTES # BLD AUTO: 0.03 K/UL (ref 0–0.11)
IMM GRANULOCYTES # BLD AUTO: 0.04 K/UL (ref 0–0.11)
IMM GRANULOCYTES # BLD AUTO: 0.04 K/UL (ref 0–0.11)
IMM GRANULOCYTES # BLD AUTO: 0.05 K/UL (ref 0–0.11)
IMM GRANULOCYTES # BLD AUTO: 0.08 K/UL (ref 0–0.11)
IMM GRANULOCYTES # BLD AUTO: 0.09 K/UL (ref 0–0.11)
IMM GRANULOCYTES # BLD AUTO: 0.11 K/UL (ref 0–0.11)
IMM GRANULOCYTES # BLD AUTO: 0.13 K/UL (ref 0–0.11)
IMM GRANULOCYTES # BLD AUTO: 0.14 K/UL (ref 0–0.11)
IMM GRANULOCYTES # BLD AUTO: 0.18 K/UL (ref 0–0.11)
IMM GRANULOCYTES # BLD AUTO: 0.19 K/UL (ref 0–0.11)
IMM GRANULOCYTES # BLD AUTO: 0.23 K/UL (ref 0–0.11)
IMM GRANULOCYTES # BLD AUTO: 0.23 K/UL (ref 0–0.11)
IMM GRANULOCYTES # BLD AUTO: 0.52 K/UL (ref 0–0.11)
IMM GRANULOCYTES NFR BLD AUTO: 0.5 % (ref 0–0.9)
IMM GRANULOCYTES NFR BLD AUTO: 0.9 % (ref 0–0.9)
IMM GRANULOCYTES NFR BLD AUTO: 1 % (ref 0–0.9)
IMM GRANULOCYTES NFR BLD AUTO: 1.3 % (ref 0–0.9)
IMM GRANULOCYTES NFR BLD AUTO: 1.6 % (ref 0–0.9)
IMM GRANULOCYTES NFR BLD AUTO: 2 % (ref 0–0.9)
IMM GRANULOCYTES NFR BLD AUTO: 2.1 % (ref 0–0.9)
IMM GRANULOCYTES NFR BLD AUTO: 2.4 % (ref 0–0.9)
IMM GRANULOCYTES NFR BLD AUTO: 3 % (ref 0–0.9)
IMM GRANULOCYTES NFR BLD AUTO: 3.3 % (ref 0–0.9)
IMM GRANULOCYTES NFR BLD AUTO: 4 % (ref 0–0.9)
IMM GRANULOCYTES NFR BLD AUTO: 4.2 % (ref 0–0.9)
INR PPP: 1.16 (ref 0.87–1.13)
INR PPP: 1.36 (ref 0.87–1.13)
LACTATE SERPL-SCNC: 2.7 MMOL/L (ref 0.5–2)
LDLC SERPL CALC-MCNC: 78 MG/DL
LV EJECT FRACT  99904: 40
LV EJECT FRACT  99904: 55
LV EJECT FRACT MOD 2C 99903: 37.58
LV EJECT FRACT MOD 2C 99903: 42.71
LV EJECT FRACT MOD 2C 99903: 55.12
LV EJECT FRACT MOD 4C 99902: 46.01
LV EJECT FRACT MOD 4C 99902: 58.19
LV EJECT FRACT MOD 4C 99902: 75.65
LV EJECT FRACT MOD BP 99901: 45.15
LV EJECT FRACT MOD BP 99901: 58.2
LV EJECT FRACT MOD BP 99901: 58.91
LYMPHOCYTES # BLD AUTO: 0.06 K/UL (ref 1–4.8)
LYMPHOCYTES # BLD AUTO: 0.12 K/UL (ref 1–4.8)
LYMPHOCYTES # BLD AUTO: 0.12 K/UL (ref 1–4.8)
LYMPHOCYTES # BLD AUTO: 0.14 K/UL (ref 1–4.8)
LYMPHOCYTES # BLD AUTO: 0.14 K/UL (ref 1–4.8)
LYMPHOCYTES # BLD AUTO: 0.15 K/UL (ref 1–4.8)
LYMPHOCYTES # BLD AUTO: 0.18 K/UL (ref 1–4.8)
LYMPHOCYTES # BLD AUTO: 0.21 K/UL (ref 1–4.8)
LYMPHOCYTES # BLD AUTO: 0.22 K/UL (ref 1–4.8)
LYMPHOCYTES # BLD AUTO: 0.34 K/UL (ref 1–4.8)
LYMPHOCYTES # BLD AUTO: 0.42 K/UL (ref 1–4.8)
LYMPHOCYTES # BLD AUTO: 0.49 K/UL (ref 1–4.8)
LYMPHOCYTES # BLD AUTO: 0.5 K/UL (ref 1–4.8)
LYMPHOCYTES # BLD AUTO: 0.66 K/UL (ref 1–4.8)
LYMPHOCYTES # BLD AUTO: 0.82 K/UL (ref 1–4.8)
LYMPHOCYTES # BLD AUTO: 1.2 K/UL (ref 1–4.8)
LYMPHOCYTES # BLD AUTO: 1.46 K/UL (ref 1–4.8)
LYMPHOCYTES NFR BLD: 0.9 % (ref 22–41)
LYMPHOCYTES NFR BLD: 1.7 % (ref 22–41)
LYMPHOCYTES NFR BLD: 1.9 % (ref 22–41)
LYMPHOCYTES NFR BLD: 10 % (ref 22–41)
LYMPHOCYTES NFR BLD: 11.7 % (ref 22–41)
LYMPHOCYTES NFR BLD: 12.4 % (ref 22–41)
LYMPHOCYTES NFR BLD: 16.3 % (ref 22–41)
LYMPHOCYTES NFR BLD: 16.5 % (ref 22–41)
LYMPHOCYTES NFR BLD: 2.4 % (ref 22–41)
LYMPHOCYTES NFR BLD: 2.5 % (ref 22–41)
LYMPHOCYTES NFR BLD: 29.2 % (ref 22–41)
LYMPHOCYTES NFR BLD: 3.7 % (ref 22–41)
LYMPHOCYTES NFR BLD: 3.8 % (ref 22–41)
LYMPHOCYTES NFR BLD: 3.9 % (ref 22–41)
LYMPHOCYTES NFR BLD: 4 % (ref 22–41)
LYMPHOCYTES NFR BLD: 5.2 % (ref 22–41)
LYMPHOCYTES NFR BLD: 6.3 % (ref 22–41)
LYMPHOCYTES NFR FLD: 5 %
MACROCYTES BLD QL SMEAR: ABNORMAL
MACROCYTES BLD QL SMEAR: ABNORMAL
MAGNESIUM SERPL-MCNC: 1.5 MG/DL (ref 1.5–2.5)
MAGNESIUM SERPL-MCNC: 1.6 MG/DL (ref 1.5–2.5)
MAGNESIUM SERPL-MCNC: 1.6 MG/DL (ref 1.5–2.5)
MAGNESIUM SERPL-MCNC: 1.8 MG/DL (ref 1.5–2.5)
MAGNESIUM SERPL-MCNC: 1.9 MG/DL (ref 1.5–2.5)
MAGNESIUM SERPL-MCNC: 1.9 MG/DL (ref 1.5–2.5)
MAGNESIUM SERPL-MCNC: 2 MG/DL (ref 1.5–2.5)
MAGNESIUM SERPL-MCNC: 2.1 MG/DL (ref 1.5–2.5)
MAGNESIUM SERPL-MCNC: 2.3 MG/DL (ref 1.5–2.5)
MAGNESIUM SERPL-MCNC: 2.6 MG/DL (ref 1.5–2.5)
MANUAL DIFF BLD: NORMAL
MANUAL DIFF BLD: NORMAL
MCH RBC QN AUTO: 31.6 PG (ref 27–33)
MCH RBC QN AUTO: 31.7 PG (ref 27–33)
MCH RBC QN AUTO: 31.7 PG (ref 27–33)
MCH RBC QN AUTO: 31.8 PG (ref 27–33)
MCH RBC QN AUTO: 31.8 PG (ref 27–33)
MCH RBC QN AUTO: 31.9 PG (ref 27–33)
MCH RBC QN AUTO: 32 PG (ref 27–33)
MCH RBC QN AUTO: 32.1 PG (ref 27–33)
MCH RBC QN AUTO: 32.2 PG (ref 27–33)
MCH RBC QN AUTO: 32.2 PG (ref 27–33)
MCH RBC QN AUTO: 32.6 PG (ref 27–33)
MCH RBC QN AUTO: 32.6 PG (ref 27–33)
MCH RBC QN AUTO: 32.8 PG (ref 27–33)
MCH RBC QN AUTO: 32.8 PG (ref 27–33)
MCH RBC QN AUTO: 33 PG (ref 27–33)
MCHC RBC AUTO-ENTMCNC: 32.3 G/DL (ref 33.6–35)
MCHC RBC AUTO-ENTMCNC: 32.4 G/DL (ref 33.6–35)
MCHC RBC AUTO-ENTMCNC: 32.6 G/DL (ref 33.6–35)
MCHC RBC AUTO-ENTMCNC: 32.9 G/DL (ref 33.6–35)
MCHC RBC AUTO-ENTMCNC: 33 G/DL (ref 33.6–35)
MCHC RBC AUTO-ENTMCNC: 33 G/DL (ref 33.6–35)
MCHC RBC AUTO-ENTMCNC: 33.1 G/DL (ref 33.6–35)
MCHC RBC AUTO-ENTMCNC: 33.1 G/DL (ref 33.6–35)
MCHC RBC AUTO-ENTMCNC: 33.3 G/DL (ref 33.6–35)
MCHC RBC AUTO-ENTMCNC: 33.3 G/DL (ref 33.6–35)
MCHC RBC AUTO-ENTMCNC: 33.6 G/DL (ref 33.6–35)
MCHC RBC AUTO-ENTMCNC: 33.7 G/DL (ref 33.6–35)
MCHC RBC AUTO-ENTMCNC: 33.8 G/DL (ref 33.6–35)
MCHC RBC AUTO-ENTMCNC: 33.9 G/DL (ref 33.6–35)
MCHC RBC AUTO-ENTMCNC: 34.1 G/DL (ref 33.6–35)
MCHC RBC AUTO-ENTMCNC: 34.3 G/DL (ref 33.6–35)
MCHC RBC AUTO-ENTMCNC: 34.6 G/DL (ref 33.6–35)
MCV RBC AUTO: 93 FL (ref 81.4–97.8)
MCV RBC AUTO: 93.2 FL (ref 81.4–97.8)
MCV RBC AUTO: 94.2 FL (ref 81.4–97.8)
MCV RBC AUTO: 94.3 FL (ref 81.4–97.8)
MCV RBC AUTO: 94.7 FL (ref 81.4–97.8)
MCV RBC AUTO: 95.7 FL (ref 81.4–97.8)
MCV RBC AUTO: 96.3 FL (ref 81.4–97.8)
MCV RBC AUTO: 96.4 FL (ref 81.4–97.8)
MCV RBC AUTO: 96.7 FL (ref 81.4–97.8)
MCV RBC AUTO: 97.4 FL (ref 81.4–97.8)
MCV RBC AUTO: 97.6 FL (ref 81.4–97.8)
MCV RBC AUTO: 97.7 FL (ref 81.4–97.8)
MCV RBC AUTO: 97.7 FL (ref 81.4–97.8)
MCV RBC AUTO: 98.4 FL (ref 81.4–97.8)
MCV RBC AUTO: 98.5 FL (ref 81.4–97.8)
MCV RBC AUTO: 98.6 FL (ref 81.4–97.8)
MCV RBC AUTO: 98.9 FL (ref 81.4–97.8)
METAMYELOCYTES NFR BLD MANUAL: 0.9 %
METAMYELOCYTES NFR BLD MANUAL: 2.6 %
MICROCYTES BLD QL SMEAR: ABNORMAL
MONOCYTES # BLD AUTO: 0.08 K/UL (ref 0–0.85)
MONOCYTES # BLD AUTO: 0.37 K/UL (ref 0–0.85)
MONOCYTES # BLD AUTO: 0.42 K/UL (ref 0–0.85)
MONOCYTES # BLD AUTO: 0.44 K/UL (ref 0–0.85)
MONOCYTES # BLD AUTO: 0.5 K/UL (ref 0–0.85)
MONOCYTES # BLD AUTO: 0.51 K/UL (ref 0–0.85)
MONOCYTES # BLD AUTO: 0.53 K/UL (ref 0–0.85)
MONOCYTES # BLD AUTO: 0.61 K/UL (ref 0–0.85)
MONOCYTES # BLD AUTO: 0.64 K/UL (ref 0–0.85)
MONOCYTES # BLD AUTO: 0.67 K/UL (ref 0–0.85)
MONOCYTES # BLD AUTO: 0.75 K/UL (ref 0–0.85)
MONOCYTES # BLD AUTO: 0.8 K/UL (ref 0–0.85)
MONOCYTES # BLD AUTO: 0.96 K/UL (ref 0–0.85)
MONOCYTES # BLD AUTO: 1.03 K/UL (ref 0–0.85)
MONOCYTES # BLD AUTO: 1.1 K/UL (ref 0–0.85)
MONOCYTES # BLD AUTO: 1.34 K/UL (ref 0–0.85)
MONOCYTES # BLD AUTO: 2.09 K/UL (ref 0–0.85)
MONOCYTES NFR BLD AUTO: 10.1 % (ref 0–13.4)
MONOCYTES NFR BLD AUTO: 10.4 % (ref 0–13.4)
MONOCYTES NFR BLD AUTO: 10.5 % (ref 0–13.4)
MONOCYTES NFR BLD AUTO: 10.8 % (ref 0–13.4)
MONOCYTES NFR BLD AUTO: 12.3 % (ref 0–13.4)
MONOCYTES NFR BLD AUTO: 12.7 % (ref 0–13.4)
MONOCYTES NFR BLD AUTO: 13.5 % (ref 0–13.4)
MONOCYTES NFR BLD AUTO: 14.9 % (ref 0–13.4)
MONOCYTES NFR BLD AUTO: 14.9 % (ref 0–13.4)
MONOCYTES NFR BLD AUTO: 15 % (ref 0–13.4)
MONOCYTES NFR BLD AUTO: 16.7 % (ref 0–13.4)
MONOCYTES NFR BLD AUTO: 16.7 % (ref 0–13.4)
MONOCYTES NFR BLD AUTO: 16.8 % (ref 0–13.4)
MONOCYTES NFR BLD AUTO: 5.1 % (ref 0–13.4)
MONOCYTES NFR BLD AUTO: 5.3 % (ref 0–13.4)
MONOCYTES NFR BLD AUTO: 7.2 % (ref 0–13.4)
MONOCYTES NFR BLD AUTO: 8.8 % (ref 0–13.4)
MONONUC CELLS NFR FLD: 12 %
MORPHOLOGY BLD-IMP: NORMAL
MORPHOLOGY BLD-IMP: NORMAL
MYCOBACTERIUM SPEC CULT: NORMAL
MYELOCYTES NFR BLD MANUAL: 2.6 %
NEUTROPHILS # BLD AUTO: 0.24 K/UL (ref 2–7.15)
NEUTROPHILS # BLD AUTO: 11.15 K/UL (ref 2–7.15)
NEUTROPHILS # BLD AUTO: 2.5 K/UL (ref 2–7.15)
NEUTROPHILS # BLD AUTO: 2.86 K/UL (ref 2–7.15)
NEUTROPHILS # BLD AUTO: 3.32 K/UL (ref 2–7.15)
NEUTROPHILS # BLD AUTO: 3.34 K/UL (ref 2–7.15)
NEUTROPHILS # BLD AUTO: 3.59 K/UL (ref 2–7.15)
NEUTROPHILS # BLD AUTO: 4.34 K/UL (ref 2–7.15)
NEUTROPHILS # BLD AUTO: 4.71 K/UL (ref 2–7.15)
NEUTROPHILS # BLD AUTO: 4.85 K/UL (ref 2–7.15)
NEUTROPHILS # BLD AUTO: 5.38 K/UL (ref 2–7.15)
NEUTROPHILS # BLD AUTO: 5.45 K/UL (ref 2–7.15)
NEUTROPHILS # BLD AUTO: 6.11 K/UL (ref 2–7.15)
NEUTROPHILS # BLD AUTO: 6.25 K/UL (ref 2–7.15)
NEUTROPHILS # BLD AUTO: 8.37 K/UL (ref 2–7.15)
NEUTROPHILS # BLD AUTO: 8.39 K/UL (ref 2–7.15)
NEUTROPHILS # BLD AUTO: 9.82 K/UL (ref 2–7.15)
NEUTROPHILS NFR BLD: 49.9 % (ref 44–72)
NEUTROPHILS NFR BLD: 65.9 % (ref 44–72)
NEUTROPHILS NFR BLD: 66.8 % (ref 44–72)
NEUTROPHILS NFR BLD: 72.1 % (ref 44–72)
NEUTROPHILS NFR BLD: 72.6 % (ref 44–72)
NEUTROPHILS NFR BLD: 73.2 % (ref 44–72)
NEUTROPHILS NFR BLD: 76 % (ref 44–72)
NEUTROPHILS NFR BLD: 79.3 % (ref 44–72)
NEUTROPHILS NFR BLD: 79.5 % (ref 44–72)
NEUTROPHILS NFR BLD: 81.8 % (ref 44–72)
NEUTROPHILS NFR BLD: 82.6 % (ref 44–72)
NEUTROPHILS NFR BLD: 83 % (ref 44–72)
NEUTROPHILS NFR BLD: 84.4 % (ref 44–72)
NEUTROPHILS NFR BLD: 84.6 % (ref 44–72)
NEUTROPHILS NFR BLD: 87.9 % (ref 44–72)
NEUTROPHILS NFR BLD: 88.6 % (ref 44–72)
NEUTROPHILS NFR BLD: 89.8 % (ref 44–72)
NEUTROPHILS NFR FLD: 83 %
NEUTS BAND NFR BLD MANUAL: 0.9 % (ref 0–10)
NRBC # BLD AUTO: 0 K/UL
NRBC # BLD AUTO: 0.03 K/UL
NRBC # BLD AUTO: 0.05 K/UL
NRBC # BLD AUTO: 0.07 K/UL
NRBC # BLD AUTO: 0.15 K/UL
NRBC # BLD AUTO: 0.16 K/UL
NRBC # BLD AUTO: 0.16 K/UL
NRBC # BLD AUTO: 0.17 K/UL
NRBC # BLD AUTO: 0.19 K/UL
NRBC # BLD AUTO: 0.22 K/UL
NRBC # BLD AUTO: 0.28 K/UL
NRBC BLD-RTO: 0 /100 WBC
NRBC BLD-RTO: 0.4 /100 WBC
NRBC BLD-RTO: 1.2 /100 WBC
NRBC BLD-RTO: 1.5 /100 WBC
NRBC BLD-RTO: 2.2 /100 WBC
NRBC BLD-RTO: 2.8 /100 WBC
NRBC BLD-RTO: 3 /100 WBC
NRBC BLD-RTO: 33.3 /100 WBC
NRBC BLD-RTO: 4.2 /100 WBC
NRBC BLD-RTO: 4.4 /100 WBC
NRBC BLD-RTO: 4.8 /100 WBC
NT-PROBNP SERPL IA-MCNC: 2377 PG/ML (ref 0–125)
OUTPT INFUS CBC COMMENT OICOM: ABNORMAL
OVALOCYTES BLD QL SMEAR: NORMAL
OVALOCYTES BLD QL SMEAR: NORMAL
PATHOLOGY CONSULT NOTE: NORMAL
PATHOLOGY CONSULT NOTE: NORMAL
PHOSPHATE SERPL-MCNC: 2.2 MG/DL (ref 2.5–4.5)
PHOSPHATE SERPL-MCNC: 2.5 MG/DL (ref 2.5–4.5)
PHOSPHATE SERPL-MCNC: 2.6 MG/DL (ref 2.5–4.5)
PHOSPHATE SERPL-MCNC: 2.7 MG/DL (ref 2.5–4.5)
PHOSPHATE SERPL-MCNC: 2.8 MG/DL (ref 2.5–4.5)
PHOSPHATE SERPL-MCNC: 2.8 MG/DL (ref 2.5–4.5)
PHOSPHATE SERPL-MCNC: 2.9 MG/DL (ref 2.5–4.5)
PHOSPHATE SERPL-MCNC: 3.5 MG/DL (ref 2.5–4.5)
PLATELET # BLD AUTO: 131 K/UL (ref 164–446)
PLATELET # BLD AUTO: 142 K/UL (ref 164–446)
PLATELET # BLD AUTO: 145 K/UL (ref 164–446)
PLATELET # BLD AUTO: 148 K/UL (ref 164–446)
PLATELET # BLD AUTO: 149 K/UL (ref 164–446)
PLATELET # BLD AUTO: 157 K/UL (ref 164–446)
PLATELET # BLD AUTO: 159 K/UL (ref 164–446)
PLATELET # BLD AUTO: 217 K/UL (ref 164–446)
PLATELET # BLD AUTO: 247 K/UL (ref 164–446)
PLATELET # BLD AUTO: 263 K/UL (ref 164–446)
PLATELET # BLD AUTO: 267 K/UL (ref 164–446)
PLATELET # BLD AUTO: 270 K/UL (ref 164–446)
PLATELET # BLD AUTO: 279 K/UL (ref 164–446)
PLATELET # BLD AUTO: 280 K/UL (ref 164–446)
PLATELET # BLD AUTO: 327 K/UL (ref 164–446)
PLATELET # BLD AUTO: 340 K/UL (ref 164–446)
PLATELET # BLD AUTO: 70 K/UL (ref 164–446)
PLATELET BLD QL SMEAR: NORMAL
PLATELET BLD QL SMEAR: NORMAL
PMV BLD AUTO: 10.3 FL (ref 9–12.9)
PMV BLD AUTO: 10.5 FL (ref 9–12.9)
PMV BLD AUTO: 11.2 FL (ref 9–12.9)
PMV BLD AUTO: 11.4 FL (ref 9–12.9)
PMV BLD AUTO: 11.4 FL (ref 9–12.9)
PMV BLD AUTO: 11.7 FL (ref 9–12.9)
PMV BLD AUTO: 11.8 FL (ref 9–12.9)
PMV BLD AUTO: 11.9 FL (ref 9–12.9)
PMV BLD AUTO: 12 FL (ref 9–12.9)
PMV BLD AUTO: 12.2 FL (ref 9–12.9)
PMV BLD AUTO: 12.3 FL (ref 9–12.9)
PMV BLD AUTO: 9.8 FL (ref 9–12.9)
PMV BLD AUTO: 9.9 FL (ref 9–12.9)
POIKILOCYTOSIS BLD QL SMEAR: NORMAL
POIKILOCYTOSIS BLD QL SMEAR: NORMAL
POTASSIUM SERPL-SCNC: 3.5 MMOL/L (ref 3.6–5.5)
POTASSIUM SERPL-SCNC: 3.5 MMOL/L (ref 3.6–5.5)
POTASSIUM SERPL-SCNC: 3.9 MMOL/L (ref 3.6–5.5)
POTASSIUM SERPL-SCNC: 3.9 MMOL/L (ref 3.6–5.5)
POTASSIUM SERPL-SCNC: 4 MMOL/L (ref 3.6–5.5)
POTASSIUM SERPL-SCNC: 4.1 MMOL/L (ref 3.6–5.5)
POTASSIUM SERPL-SCNC: 4.3 MMOL/L (ref 3.6–5.5)
POTASSIUM SERPL-SCNC: 4.5 MMOL/L (ref 3.6–5.5)
POTASSIUM SERPL-SCNC: 4.6 MMOL/L (ref 3.6–5.5)
POTASSIUM SERPL-SCNC: 4.7 MMOL/L (ref 3.6–5.5)
POTASSIUM SERPL-SCNC: 4.7 MMOL/L (ref 3.6–5.5)
POTASSIUM SERPL-SCNC: 4.8 MMOL/L (ref 3.6–5.5)
POTASSIUM SERPL-SCNC: 4.8 MMOL/L (ref 3.6–5.5)
POTASSIUM SERPL-SCNC: 4.9 MMOL/L (ref 3.6–5.5)
POTASSIUM SERPL-SCNC: 4.9 MMOL/L (ref 3.6–5.5)
PROCALCITONIN SERPL-MCNC: <0.02 NG/ML
PROT SERPL-MCNC: 5.8 G/DL (ref 6–8.2)
PROT SERPL-MCNC: 6 G/DL (ref 6–8.2)
PROT SERPL-MCNC: 6.3 G/DL (ref 6–8.2)
PROT SERPL-MCNC: 6.5 G/DL (ref 6–8.2)
PROT SERPL-MCNC: 6.6 G/DL (ref 6–8.2)
PROT SERPL-MCNC: 6.8 G/DL (ref 6–8.2)
PROT SERPL-MCNC: 7.4 G/DL (ref 6–8.2)
PROTHROMBIN TIME: 14.3 SEC (ref 12–14.6)
PROTHROMBIN TIME: 16.6 SEC (ref 12–14.6)
RAD ONC ARIA COURSE LAST TREATMENT DATE: NORMAL
RAD ONC ARIA COURSE TREATMENT ELAPSED DAYS: NORMAL
RAD ONC ARIA REFERENCE POINT DOSAGE GIVEN TO DATE: 0
RAD ONC ARIA REFERENCE POINT DOSAGE GIVEN TO DATE: 0
RAD ONC ARIA REFERENCE POINT DOSAGE GIVEN TO DATE: 10
RAD ONC ARIA REFERENCE POINT DOSAGE GIVEN TO DATE: 10.5
RAD ONC ARIA REFERENCE POINT DOSAGE GIVEN TO DATE: 12
RAD ONC ARIA REFERENCE POINT DOSAGE GIVEN TO DATE: 12.6
RAD ONC ARIA REFERENCE POINT DOSAGE GIVEN TO DATE: 14
RAD ONC ARIA REFERENCE POINT DOSAGE GIVEN TO DATE: 14.7
RAD ONC ARIA REFERENCE POINT DOSAGE GIVEN TO DATE: 16
RAD ONC ARIA REFERENCE POINT DOSAGE GIVEN TO DATE: 16.8
RAD ONC ARIA REFERENCE POINT DOSAGE GIVEN TO DATE: 18
RAD ONC ARIA REFERENCE POINT DOSAGE GIVEN TO DATE: 18.9
RAD ONC ARIA REFERENCE POINT DOSAGE GIVEN TO DATE: 2
RAD ONC ARIA REFERENCE POINT DOSAGE GIVEN TO DATE: 2.1
RAD ONC ARIA REFERENCE POINT DOSAGE GIVEN TO DATE: 20
RAD ONC ARIA REFERENCE POINT DOSAGE GIVEN TO DATE: 21
RAD ONC ARIA REFERENCE POINT DOSAGE GIVEN TO DATE: 22
RAD ONC ARIA REFERENCE POINT DOSAGE GIVEN TO DATE: 23.1
RAD ONC ARIA REFERENCE POINT DOSAGE GIVEN TO DATE: 24
RAD ONC ARIA REFERENCE POINT DOSAGE GIVEN TO DATE: 25.19
RAD ONC ARIA REFERENCE POINT DOSAGE GIVEN TO DATE: 26
RAD ONC ARIA REFERENCE POINT DOSAGE GIVEN TO DATE: 27.29
RAD ONC ARIA REFERENCE POINT DOSAGE GIVEN TO DATE: 28
RAD ONC ARIA REFERENCE POINT DOSAGE GIVEN TO DATE: 29.39
RAD ONC ARIA REFERENCE POINT DOSAGE GIVEN TO DATE: 30
RAD ONC ARIA REFERENCE POINT DOSAGE GIVEN TO DATE: 31.49
RAD ONC ARIA REFERENCE POINT DOSAGE GIVEN TO DATE: 32
RAD ONC ARIA REFERENCE POINT DOSAGE GIVEN TO DATE: 33.59
RAD ONC ARIA REFERENCE POINT DOSAGE GIVEN TO DATE: 34
RAD ONC ARIA REFERENCE POINT DOSAGE GIVEN TO DATE: 35.69
RAD ONC ARIA REFERENCE POINT DOSAGE GIVEN TO DATE: 36
RAD ONC ARIA REFERENCE POINT DOSAGE GIVEN TO DATE: 37.79
RAD ONC ARIA REFERENCE POINT DOSAGE GIVEN TO DATE: 38
RAD ONC ARIA REFERENCE POINT DOSAGE GIVEN TO DATE: 39.89
RAD ONC ARIA REFERENCE POINT DOSAGE GIVEN TO DATE: 4
RAD ONC ARIA REFERENCE POINT DOSAGE GIVEN TO DATE: 4.2
RAD ONC ARIA REFERENCE POINT DOSAGE GIVEN TO DATE: 40
RAD ONC ARIA REFERENCE POINT DOSAGE GIVEN TO DATE: 41.99
RAD ONC ARIA REFERENCE POINT DOSAGE GIVEN TO DATE: 42
RAD ONC ARIA REFERENCE POINT DOSAGE GIVEN TO DATE: 44
RAD ONC ARIA REFERENCE POINT DOSAGE GIVEN TO DATE: 44.09
RAD ONC ARIA REFERENCE POINT DOSAGE GIVEN TO DATE: 46
RAD ONC ARIA REFERENCE POINT DOSAGE GIVEN TO DATE: 46.19
RAD ONC ARIA REFERENCE POINT DOSAGE GIVEN TO DATE: 48
RAD ONC ARIA REFERENCE POINT DOSAGE GIVEN TO DATE: 48.29
RAD ONC ARIA REFERENCE POINT DOSAGE GIVEN TO DATE: 50
RAD ONC ARIA REFERENCE POINT DOSAGE GIVEN TO DATE: 50.39
RAD ONC ARIA REFERENCE POINT DOSAGE GIVEN TO DATE: 52
RAD ONC ARIA REFERENCE POINT DOSAGE GIVEN TO DATE: 52.49
RAD ONC ARIA REFERENCE POINT DOSAGE GIVEN TO DATE: 54
RAD ONC ARIA REFERENCE POINT DOSAGE GIVEN TO DATE: 54.59
RAD ONC ARIA REFERENCE POINT DOSAGE GIVEN TO DATE: 56.69
RAD ONC ARIA REFERENCE POINT DOSAGE GIVEN TO DATE: 6
RAD ONC ARIA REFERENCE POINT DOSAGE GIVEN TO DATE: 6.3
RAD ONC ARIA REFERENCE POINT DOSAGE GIVEN TO DATE: 8
RAD ONC ARIA REFERENCE POINT DOSAGE GIVEN TO DATE: 8.4
RAD ONC ARIA REFERENCE POINT ID: NORMAL
RAD ONC ARIA REFERENCE POINT SESSION DOSAGE GIVEN: 2
RAD ONC ARIA REFERENCE POINT SESSION DOSAGE GIVEN: 2.1
RBC # BLD AUTO: 2.79 M/UL (ref 4.2–5.4)
RBC # BLD AUTO: 2.98 M/UL (ref 4.2–5.4)
RBC # BLD AUTO: 3 M/UL (ref 4.2–5.4)
RBC # BLD AUTO: 3.07 M/UL (ref 4.2–5.4)
RBC # BLD AUTO: 3.07 M/UL (ref 4.2–5.4)
RBC # BLD AUTO: 3.11 M/UL (ref 4.2–5.4)
RBC # BLD AUTO: 3.21 M/UL (ref 4.2–5.4)
RBC # BLD AUTO: 3.22 M/UL (ref 4.2–5.4)
RBC # BLD AUTO: 3.55 M/UL (ref 4.2–5.4)
RBC # BLD AUTO: 3.57 M/UL (ref 4.2–5.4)
RBC # BLD AUTO: 4.17 M/UL (ref 4.2–5.4)
RBC # BLD AUTO: 4.35 M/UL (ref 4.2–5.4)
RBC # BLD AUTO: 4.36 M/UL (ref 4.2–5.4)
RBC # BLD AUTO: 4.57 M/UL (ref 4.2–5.4)
RBC # BLD AUTO: 4.73 M/UL (ref 4.2–5.4)
RBC # BLD AUTO: 4.78 M/UL (ref 4.2–5.4)
RBC # BLD AUTO: 4.91 M/UL (ref 4.2–5.4)
RBC BLD AUTO: PRESENT
RBC BLD AUTO: PRESENT
RHODAMINE-AURAMINE STN SPEC: NORMAL
RHODAMINE-AURAMINE STN SPEC: NORMAL
SIGNIFICANT IND 70042: NORMAL
SITE SITE: NORMAL
SODIUM SERPL-SCNC: 130 MMOL/L (ref 135–145)
SODIUM SERPL-SCNC: 131 MMOL/L (ref 135–145)
SODIUM SERPL-SCNC: 131 MMOL/L (ref 135–145)
SODIUM SERPL-SCNC: 132 MMOL/L (ref 135–145)
SODIUM SERPL-SCNC: 134 MMOL/L (ref 135–145)
SODIUM SERPL-SCNC: 134 MMOL/L (ref 135–145)
SODIUM SERPL-SCNC: 135 MMOL/L (ref 135–145)
SODIUM SERPL-SCNC: 135 MMOL/L (ref 135–145)
SODIUM SERPL-SCNC: 136 MMOL/L (ref 135–145)
SODIUM SERPL-SCNC: 137 MMOL/L (ref 135–145)
SODIUM SERPL-SCNC: 137 MMOL/L (ref 135–145)
SODIUM SERPL-SCNC: 138 MMOL/L (ref 135–145)
SODIUM SERPL-SCNC: 139 MMOL/L (ref 135–145)
SOURCE SOURCE: NORMAL
TRIGL SERPL-MCNC: 93 MG/DL (ref 0–149)
TROPONIN T SERPL-MCNC: 38 NG/L (ref 6–19)
TSH SERPL DL<=0.005 MIU/L-ACNC: 2.42 UIU/ML (ref 0.38–5.33)
UFH PPP CHRO-ACNC: 0.11 IU/ML
UFH PPP CHRO-ACNC: 0.14 IU/ML
UFH PPP CHRO-ACNC: 0.34 IU/ML
UFH PPP CHRO-ACNC: 0.49 IU/ML
UFH PPP CHRO-ACNC: 0.62 IU/ML
UFH PPP CHRO-ACNC: <0.1 IU/ML
WBC # BLD AUTO: 0.5 K/UL (ref 4.8–10.8)
WBC # BLD AUTO: 10.6 K/UL (ref 4.8–10.8)
WBC # BLD AUTO: 11.2 K/UL (ref 4.8–10.8)
WBC # BLD AUTO: 12.4 K/UL (ref 4.8–10.8)
WBC # BLD AUTO: 12.5 K/UL (ref 4.8–10.8)
WBC # BLD AUTO: 3.4 K/UL (ref 4.8–10.8)
WBC # BLD AUTO: 3.9 K/UL (ref 4.8–10.8)
WBC # BLD AUTO: 4 K/UL (ref 4.8–10.8)
WBC # BLD AUTO: 4.1 K/UL (ref 4.8–10.8)
WBC # BLD AUTO: 5 K/UL (ref 4.8–10.8)
WBC # BLD AUTO: 5.7 K/UL (ref 4.8–10.8)
WBC # BLD AUTO: 5.7 K/UL (ref 4.8–10.8)
WBC # BLD AUTO: 6.4 K/UL (ref 4.8–10.8)
WBC # BLD AUTO: 6.9 K/UL (ref 4.8–10.8)
WBC # BLD AUTO: 6.9 K/UL (ref 4.8–10.8)
WBC # BLD AUTO: 7.4 K/UL (ref 4.8–10.8)
WBC # BLD AUTO: 7.4 K/UL (ref 4.8–10.8)

## 2022-01-01 PROCEDURE — 99454 REM MNTR PHYSIOL PARAM 16-30: CPT

## 2022-01-01 PROCEDURE — 83036 HEMOGLOBIN GLYCOSYLATED A1C: CPT

## 2022-01-01 PROCEDURE — 700105 HCHG RX REV CODE 258: Performed by: INTERNAL MEDICINE

## 2022-01-01 PROCEDURE — 77427 RADIATION TX MANAGEMENT X5: CPT | Performed by: RADIOLOGY

## 2022-01-01 PROCEDURE — 770020 HCHG ROOM/CARE - TELE (206)

## 2022-01-01 PROCEDURE — 84100 ASSAY OF PHOSPHORUS: CPT

## 2022-01-01 PROCEDURE — 700102 HCHG RX REV CODE 250 W/ 637 OVERRIDE(OP): Performed by: FAMILY MEDICINE

## 2022-01-01 PROCEDURE — 700102 HCHG RX REV CODE 250 W/ 637 OVERRIDE(OP): Performed by: INTERNAL MEDICINE

## 2022-01-01 PROCEDURE — 700117 HCHG RX CONTRAST REV CODE 255: Performed by: INTERNAL MEDICINE

## 2022-01-01 PROCEDURE — 700111 HCHG RX REV CODE 636 W/ 250 OVERRIDE (IP): Performed by: HOSPITALIST

## 2022-01-01 PROCEDURE — A9270 NON-COVERED ITEM OR SERVICE: HCPCS | Performed by: STUDENT IN AN ORGANIZED HEALTH CARE EDUCATION/TRAINING PROGRAM

## 2022-01-01 PROCEDURE — 93005 ELECTROCARDIOGRAM TRACING: CPT | Performed by: EMERGENCY MEDICINE

## 2022-01-01 PROCEDURE — 87206 SMEAR FLUORESCENT/ACID STAI: CPT

## 2022-01-01 PROCEDURE — 700101 HCHG RX REV CODE 250

## 2022-01-01 PROCEDURE — 36415 COLL VENOUS BLD VENIPUNCTURE: CPT

## 2022-01-01 PROCEDURE — 700102 HCHG RX REV CODE 250 W/ 637 OVERRIDE(OP): Performed by: STUDENT IN AN ORGANIZED HEALTH CARE EDUCATION/TRAINING PROGRAM

## 2022-01-01 PROCEDURE — 700111 HCHG RX REV CODE 636 W/ 250 OVERRIDE (IP): Performed by: INTERNAL MEDICINE

## 2022-01-01 PROCEDURE — 94669 MECHANICAL CHEST WALL OSCILL: CPT

## 2022-01-01 PROCEDURE — 77336 RADIATION PHYSICS CONSULT: CPT | Performed by: RADIOLOGY

## 2022-01-01 PROCEDURE — 94760 N-INVAS EAR/PLS OXIMETRY 1: CPT

## 2022-01-01 PROCEDURE — 770004 HCHG ROOM/CARE - ONCOLOGY PRIVATE *

## 2022-01-01 PROCEDURE — 87070 CULTURE OTHR SPECIMN AEROBIC: CPT

## 2022-01-01 PROCEDURE — A9270 NON-COVERED ITEM OR SERVICE: HCPCS | Performed by: INTERNAL MEDICINE

## 2022-01-01 PROCEDURE — 83605 ASSAY OF LACTIC ACID: CPT

## 2022-01-01 PROCEDURE — 87116 MYCOBACTERIA CULTURE: CPT

## 2022-01-01 PROCEDURE — 94640 AIRWAY INHALATION TREATMENT: CPT

## 2022-01-01 PROCEDURE — 77386 HCHG IMRT DELIVERY COMPLEX: CPT | Performed by: RADIOLOGY

## 2022-01-01 PROCEDURE — 83735 ASSAY OF MAGNESIUM: CPT

## 2022-01-01 PROCEDURE — 84484 ASSAY OF TROPONIN QUANT: CPT

## 2022-01-01 PROCEDURE — 89240 UNLISTED MISC PATH TEST: CPT

## 2022-01-01 PROCEDURE — 77014 PR CT GUIDANCE PLACEMENT RAD THERAPY FIELDS: CPT | Mod: 26 | Performed by: RADIOLOGY

## 2022-01-01 PROCEDURE — 80048 BASIC METABOLIC PNL TOTAL CA: CPT

## 2022-01-01 PROCEDURE — 700101 HCHG RX REV CODE 250: Performed by: STUDENT IN AN ORGANIZED HEALTH CARE EDUCATION/TRAINING PROGRAM

## 2022-01-01 PROCEDURE — 700102 HCHG RX REV CODE 250 W/ 637 OVERRIDE(OP): Performed by: HOSPITALIST

## 2022-01-01 PROCEDURE — 93970 EXTREMITY STUDY: CPT

## 2022-01-01 PROCEDURE — 99291 CRITICAL CARE FIRST HOUR: CPT | Mod: GC | Performed by: EMERGENCY MEDICINE

## 2022-01-01 PROCEDURE — 99233 SBSQ HOSP IP/OBS HIGH 50: CPT | Performed by: INTERNAL MEDICINE

## 2022-01-01 PROCEDURE — 700111 HCHG RX REV CODE 636 W/ 250 OVERRIDE (IP): Performed by: NURSE PRACTITIONER

## 2022-01-01 PROCEDURE — 700117 HCHG RX CONTRAST REV CODE 255: Performed by: EMERGENCY MEDICINE

## 2022-01-01 PROCEDURE — 770022 HCHG ROOM/CARE - ICU (200)

## 2022-01-01 PROCEDURE — 160035 HCHG PACU - 1ST 60 MINS PHASE I: Performed by: INTERNAL MEDICINE

## 2022-01-01 PROCEDURE — 700105 HCHG RX REV CODE 258: Performed by: FAMILY MEDICINE

## 2022-01-01 PROCEDURE — 96368 THER/DIAG CONCURRENT INF: CPT

## 2022-01-01 PROCEDURE — 700101 HCHG RX REV CODE 250: Performed by: FAMILY MEDICINE

## 2022-01-01 PROCEDURE — 99255 IP/OBS CONSLTJ NEW/EST HI 80: CPT | Performed by: INTERNAL MEDICINE

## 2022-01-01 PROCEDURE — A9270 NON-COVERED ITEM OR SERVICE: HCPCS | Performed by: HOSPITALIST

## 2022-01-01 PROCEDURE — 71045 X-RAY EXAM CHEST 1 VIEW: CPT

## 2022-01-01 PROCEDURE — 93970 EXTREMITY STUDY: CPT | Mod: 26 | Performed by: INTERNAL MEDICINE

## 2022-01-01 PROCEDURE — 99232 SBSQ HOSP IP/OBS MODERATE 35: CPT | Performed by: HOSPITALIST

## 2022-01-01 PROCEDURE — 0BD78ZX EXTRACTION OF LEFT MAIN BRONCHUS, VIA NATURAL OR ARTIFICIAL OPENING ENDOSCOPIC, DIAGNOSTIC: ICD-10-PCS | Performed by: INTERNAL MEDICINE

## 2022-01-01 PROCEDURE — RXMED WILLOW AMBULATORY MEDICATION CHARGE: Performed by: INTERNAL MEDICINE

## 2022-01-01 PROCEDURE — 82962 GLUCOSE BLOOD TEST: CPT

## 2022-01-01 PROCEDURE — 700111 HCHG RX REV CODE 636 W/ 250 OVERRIDE (IP): Performed by: EMERGENCY MEDICINE

## 2022-01-01 PROCEDURE — 700101 HCHG RX REV CODE 250: Performed by: HOSPITALIST

## 2022-01-01 PROCEDURE — 99239 HOSP IP/OBS DSCHRG MGMT >30: CPT | Performed by: HOSPITALIST

## 2022-01-01 PROCEDURE — 96375 TX/PRO/DX INJ NEW DRUG ADDON: CPT

## 2022-01-01 PROCEDURE — 700101 HCHG RX REV CODE 250: Performed by: ANESTHESIOLOGY

## 2022-01-01 PROCEDURE — 160029 HCHG SURGERY MINUTES - 1ST 30 MINS LEVEL 4: Performed by: INTERNAL MEDICINE

## 2022-01-01 PROCEDURE — 77334 RADIATION TREATMENT AID(S): CPT | Performed by: RADIOLOGY

## 2022-01-01 PROCEDURE — 96367 TX/PROPH/DG ADDL SEQ IV INF: CPT

## 2022-01-01 PROCEDURE — 99254 IP/OBS CNSLTJ NEW/EST MOD 60: CPT | Performed by: HOSPITALIST

## 2022-01-01 PROCEDURE — 99407 BEHAV CHNG SMOKING > 10 MIN: CPT

## 2022-01-01 PROCEDURE — A9270 NON-COVERED ITEM OR SERVICE: HCPCS | Performed by: FAMILY MEDICINE

## 2022-01-01 PROCEDURE — 96417 CHEMO IV INFUS EACH ADDL SEQ: CPT

## 2022-01-01 PROCEDURE — 88112 CYTOPATH CELL ENHANCE TECH: CPT

## 2022-01-01 PROCEDURE — 700111 HCHG RX REV CODE 636 W/ 250 OVERRIDE (IP): Performed by: FAMILY MEDICINE

## 2022-01-01 PROCEDURE — 99406 BEHAV CHNG SMOKING 3-10 MIN: CPT

## 2022-01-01 PROCEDURE — 700101 HCHG RX REV CODE 250: Performed by: INTERNAL MEDICINE

## 2022-01-01 PROCEDURE — 700111 HCHG RX REV CODE 636 W/ 250 OVERRIDE (IP)

## 2022-01-01 PROCEDURE — 80053 COMPREHEN METABOLIC PANEL: CPT

## 2022-01-01 PROCEDURE — 85025 COMPLETE CBC W/AUTO DIFF WBC: CPT

## 2022-01-01 PROCEDURE — A9576 INJ PROHANCE MULTIPACK: HCPCS | Performed by: INTERNAL MEDICINE

## 2022-01-01 PROCEDURE — 93306 TTE W/DOPPLER COMPLETE: CPT | Mod: 26 | Performed by: INTERNAL MEDICINE

## 2022-01-01 PROCEDURE — 160041 HCHG SURGERY MINUTES - EA ADDL 1 MIN LEVEL 4: Performed by: INTERNAL MEDICINE

## 2022-01-01 PROCEDURE — 77293 RESPIRATOR MOTION MGMT SIMUL: CPT | Performed by: RADIOLOGY

## 2022-01-01 PROCEDURE — 96376 TX/PRO/DX INJ SAME DRUG ADON: CPT

## 2022-01-01 PROCEDURE — 31628 BRONCHOSCOPY/LUNG BX EACH: CPT | Performed by: INTERNAL MEDICINE

## 2022-01-01 PROCEDURE — 77014 PR CT GUIDANCE PLACEMENT RAD THERAPY FIELDS: CPT | Mod: 26,XE | Performed by: RADIOLOGY

## 2022-01-01 PROCEDURE — 84443 ASSAY THYROID STIM HORMONE: CPT

## 2022-01-01 PROCEDURE — 77290 THER RAD SIMULAJ FIELD CPLX: CPT | Performed by: RADIOLOGY

## 2022-01-01 PROCEDURE — 82962 GLUCOSE BLOOD TEST: CPT | Mod: 91

## 2022-01-01 PROCEDURE — 99232 SBSQ HOSP IP/OBS MODERATE 35: CPT | Performed by: STUDENT IN AN ORGANIZED HEALTH CARE EDUCATION/TRAINING PROGRAM

## 2022-01-01 PROCEDURE — 87205 SMEAR GRAM STAIN: CPT | Mod: 91

## 2022-01-01 PROCEDURE — 85610 PROTHROMBIN TIME: CPT

## 2022-01-01 PROCEDURE — 99291 CRITICAL CARE FIRST HOUR: CPT | Performed by: INTERNAL MEDICINE

## 2022-01-01 PROCEDURE — 31623 DX BRONCHOSCOPE/BRUSH: CPT | Performed by: INTERNAL MEDICINE

## 2022-01-01 PROCEDURE — 700105 HCHG RX REV CODE 258: Performed by: EMERGENCY MEDICINE

## 2022-01-01 PROCEDURE — 77301 RADIOTHERAPY DOSE PLAN IMRT: CPT | Mod: 26 | Performed by: RADIOLOGY

## 2022-01-01 PROCEDURE — 160002 HCHG RECOVERY MINUTES (STAT): Performed by: INTERNAL MEDICINE

## 2022-01-01 PROCEDURE — 70553 MRI BRAIN STEM W/O & W/DYE: CPT

## 2022-01-01 PROCEDURE — 84145 PROCALCITONIN (PCT): CPT

## 2022-01-01 PROCEDURE — 87205 SMEAR GRAM STAIN: CPT

## 2022-01-01 PROCEDURE — 87102 FUNGUS ISOLATION CULTURE: CPT

## 2022-01-01 PROCEDURE — 99232 SBSQ HOSP IP/OBS MODERATE 35: CPT | Performed by: INTERNAL MEDICINE

## 2022-01-01 PROCEDURE — 700105 HCHG RX REV CODE 258: Performed by: STUDENT IN AN ORGANIZED HEALTH CARE EDUCATION/TRAINING PROGRAM

## 2022-01-01 PROCEDURE — 99223 1ST HOSP IP/OBS HIGH 75: CPT | Performed by: RADIOLOGY

## 2022-01-01 PROCEDURE — 31645 BRNCHSC W/THER ASPIR 1ST: CPT | Performed by: INTERNAL MEDICINE

## 2022-01-01 PROCEDURE — 83880 ASSAY OF NATRIURETIC PEPTIDE: CPT

## 2022-01-01 PROCEDURE — 88305 TISSUE EXAM BY PATHOLOGIST: CPT | Mod: 59

## 2022-01-01 PROCEDURE — 96415 CHEMO IV INFUSION ADDL HR: CPT

## 2022-01-01 PROCEDURE — 99223 1ST HOSP IP/OBS HIGH 75: CPT | Performed by: STUDENT IN AN ORGANIZED HEALTH CARE EDUCATION/TRAINING PROGRAM

## 2022-01-01 PROCEDURE — 99233 SBSQ HOSP IP/OBS HIGH 50: CPT | Mod: 25 | Performed by: INTERNAL MEDICINE

## 2022-01-01 PROCEDURE — 71260 CT THORAX DX C+: CPT

## 2022-01-01 PROCEDURE — 94664 DEMO&/EVAL PT USE INHALER: CPT

## 2022-01-01 PROCEDURE — 160009 HCHG ANES TIME/MIN: Performed by: INTERNAL MEDICINE

## 2022-01-01 PROCEDURE — 77300 RADIATION THERAPY DOSE PLAN: CPT | Mod: 26 | Performed by: RADIOLOGY

## 2022-01-01 PROCEDURE — 304540 HCHG NITRO SET VENT 2ND TUB

## 2022-01-01 PROCEDURE — 85520 HEPARIN ASSAY: CPT

## 2022-01-01 PROCEDURE — 88172 CYTP DX EVAL FNA 1ST EA SITE: CPT

## 2022-01-01 PROCEDURE — 77263 THER RADIOLOGY TX PLNG CPLX: CPT | Performed by: RADIOLOGY

## 2022-01-01 PROCEDURE — 77470 SPECIAL RADIATION TREATMENT: CPT | Performed by: RADIOLOGY

## 2022-01-01 PROCEDURE — 99453 REM MNTR PHYSIOL PARAM SETUP: CPT

## 2022-01-01 PROCEDURE — 99223 1ST HOSP IP/OBS HIGH 75: CPT | Mod: AI | Performed by: FAMILY MEDICINE

## 2022-01-01 PROCEDURE — 77338 DESIGN MLC DEVICE FOR IMRT: CPT | Performed by: RADIOLOGY

## 2022-01-01 PROCEDURE — 87015 SPECIMEN INFECT AGNT CONCNTJ: CPT

## 2022-01-01 PROCEDURE — 88173 CYTOPATH EVAL FNA REPORT: CPT | Mod: 91

## 2022-01-01 PROCEDURE — HZ2ZZZZ DETOXIFICATION SERVICES FOR SUBSTANCE ABUSE TREATMENT: ICD-10-PCS | Performed by: INTERNAL MEDICINE

## 2022-01-01 PROCEDURE — 77334 RADIATION TREATMENT AID(S): CPT | Mod: 26 | Performed by: RADIOLOGY

## 2022-01-01 PROCEDURE — 99233 SBSQ HOSP IP/OBS HIGH 50: CPT | Performed by: HOSPITALIST

## 2022-01-01 PROCEDURE — 77293 RESPIRATOR MOTION MGMT SIMUL: CPT | Mod: 26 | Performed by: RADIOLOGY

## 2022-01-01 PROCEDURE — 99239 HOSP IP/OBS DSCHRG MGMT >30: CPT | Performed by: INTERNAL MEDICINE

## 2022-01-01 PROCEDURE — 96365 THER/PROPH/DIAG IV INF INIT: CPT

## 2022-01-01 PROCEDURE — 77338 DESIGN MLC DEVICE FOR IMRT: CPT | Mod: 26 | Performed by: RADIOLOGY

## 2022-01-01 PROCEDURE — 80069 RENAL FUNCTION PANEL: CPT

## 2022-01-01 PROCEDURE — 96413 CHEMO IV INFUSION 1 HR: CPT

## 2022-01-01 PROCEDURE — 85007 BL SMEAR W/DIFF WBC COUNT: CPT

## 2022-01-01 PROCEDURE — 93308 TTE F-UP OR LMTD: CPT

## 2022-01-01 PROCEDURE — 31653 BRONCH EBUS SAMPLNG 3/> NODE: CPT | Performed by: INTERNAL MEDICINE

## 2022-01-01 PROCEDURE — 160048 HCHG OR STATISTICAL LEVEL 1-5: Performed by: INTERNAL MEDICINE

## 2022-01-01 PROCEDURE — 80061 LIPID PANEL: CPT

## 2022-01-01 PROCEDURE — 770001 HCHG ROOM/CARE - MED/SURG/GYN PRIV*

## 2022-01-01 PROCEDURE — 77300 RADIATION THERAPY DOSE PLAN: CPT | Performed by: RADIOLOGY

## 2022-01-01 PROCEDURE — 71046 X-RAY EXAM CHEST 2 VIEWS: CPT

## 2022-01-01 PROCEDURE — 99292 CRITICAL CARE ADDL 30 MIN: CPT | Performed by: INTERNAL MEDICINE

## 2022-01-01 PROCEDURE — 700111 HCHG RX REV CODE 636 W/ 250 OVERRIDE (IP): Performed by: STUDENT IN AN ORGANIZED HEALTH CARE EDUCATION/TRAINING PROGRAM

## 2022-01-01 PROCEDURE — 93308 TTE F-UP OR LMTD: CPT | Mod: 26 | Performed by: INTERNAL MEDICINE

## 2022-01-01 PROCEDURE — 96374 THER/PROPH/DIAG INJ IV PUSH: CPT

## 2022-01-01 PROCEDURE — 700111 HCHG RX REV CODE 636 W/ 250 OVERRIDE (IP): Performed by: ANESTHESIOLOGY

## 2022-01-01 PROCEDURE — 77280 THER RAD SIMULAJ FIELD SMPL: CPT | Performed by: RADIOLOGY

## 2022-01-01 PROCEDURE — 71275 CT ANGIOGRAPHY CHEST: CPT

## 2022-01-01 PROCEDURE — 85730 THROMBOPLASTIN TIME PARTIAL: CPT

## 2022-01-01 PROCEDURE — 77301 RADIOTHERAPY DOSE PLAN IMRT: CPT | Performed by: RADIOLOGY

## 2022-01-01 PROCEDURE — 99406 BEHAV CHNG SMOKING 3-10 MIN: CPT | Performed by: INTERNAL MEDICINE

## 2022-01-01 PROCEDURE — 99291 CRITICAL CARE FIRST HOUR: CPT

## 2022-01-01 PROCEDURE — 99497 ADVNCD CARE PLAN 30 MIN: CPT | Performed by: NURSE PRACTITIONER

## 2022-01-01 PROCEDURE — 93005 ELECTROCARDIOGRAM TRACING: CPT

## 2022-01-01 PROCEDURE — 00520 ANES CLOSED CHEST PX NOS: CPT | Performed by: ANESTHESIOLOGY

## 2022-01-01 PROCEDURE — 93306 TTE W/DOPPLER COMPLETE: CPT

## 2022-01-01 PROCEDURE — 77470 SPECIAL RADIATION TREATMENT: CPT | Mod: 26 | Performed by: RADIOLOGY

## 2022-01-01 PROCEDURE — RXMED WILLOW AMBULATORY MEDICATION CHARGE: Performed by: HOSPITALIST

## 2022-01-01 RX ORDER — DILTIAZEM HYDROCHLORIDE 300 MG/1
300 CAPSULE, COATED, EXTENDED RELEASE ORAL
Status: CANCELLED | OUTPATIENT
Start: 2022-01-01

## 2022-01-01 RX ORDER — AZITHROMYCIN 250 MG/1
500 TABLET, FILM COATED ORAL DAILY
Status: DISCONTINUED | OUTPATIENT
Start: 2022-01-01 | End: 2022-01-01 | Stop reason: HOSPADM

## 2022-01-01 RX ORDER — ONDANSETRON 2 MG/ML
4 INJECTION INTRAMUSCULAR; INTRAVENOUS EVERY 4 HOURS PRN
Status: DISCONTINUED | OUTPATIENT
Start: 2022-01-01 | End: 2022-01-01 | Stop reason: HOSPADM

## 2022-01-01 RX ORDER — FUROSEMIDE 10 MG/ML
20 INJECTION INTRAMUSCULAR; INTRAVENOUS ONCE
Status: COMPLETED | OUTPATIENT
Start: 2022-01-01 | End: 2022-01-01

## 2022-01-01 RX ORDER — 0.9 % SODIUM CHLORIDE 0.9 %
10 VIAL (ML) INJECTION PRN
Status: CANCELLED | OUTPATIENT
Start: 2022-01-01

## 2022-01-01 RX ORDER — ONDANSETRON 8 MG/1
8 TABLET, ORALLY DISINTEGRATING ORAL PRN
Status: CANCELLED | OUTPATIENT
Start: 2022-01-01

## 2022-01-01 RX ORDER — SODIUM CHLORIDE, SODIUM LACTATE, POTASSIUM CHLORIDE, CALCIUM CHLORIDE 600; 310; 30; 20 MG/100ML; MG/100ML; MG/100ML; MG/100ML
INJECTION, SOLUTION INTRAVENOUS CONTINUOUS
Status: ACTIVE | OUTPATIENT
Start: 2022-01-01 | End: 2022-01-01

## 2022-01-01 RX ORDER — 0.9 % SODIUM CHLORIDE 0.9 %
VIAL (ML) INJECTION PRN
Status: CANCELLED | OUTPATIENT
Start: 2022-01-01

## 2022-01-01 RX ORDER — ACETAMINOPHEN 325 MG/1
650 TABLET ORAL EVERY 6 HOURS PRN
Status: DISCONTINUED | OUTPATIENT
Start: 2022-01-01 | End: 2022-01-01 | Stop reason: HOSPADM

## 2022-01-01 RX ORDER — 0.9 % SODIUM CHLORIDE 0.9 %
3 VIAL (ML) INJECTION PRN
Status: CANCELLED | OUTPATIENT
Start: 2022-01-01

## 2022-01-01 RX ORDER — SODIUM CHLORIDE 9 MG/ML
1000 INJECTION, SOLUTION INTRAVENOUS ONCE
Status: COMPLETED | OUTPATIENT
Start: 2022-01-01 | End: 2022-01-01

## 2022-01-01 RX ORDER — HEPARIN SODIUM (PORCINE) LOCK FLUSH IV SOLN 100 UNIT/ML 100 UNIT/ML
500 SOLUTION INTRAVENOUS PRN
Status: CANCELLED | OUTPATIENT
Start: 2022-01-01

## 2022-01-01 RX ORDER — LORAZEPAM 2 MG/1
2 TABLET ORAL
Status: DISCONTINUED | OUTPATIENT
Start: 2022-01-01 | End: 2022-01-01

## 2022-01-01 RX ORDER — DIPHENHYDRAMINE HYDROCHLORIDE 50 MG/ML
50 INJECTION INTRAMUSCULAR; INTRAVENOUS PRN
Status: CANCELLED | OUTPATIENT
Start: 2022-01-01

## 2022-01-01 RX ORDER — HALOPERIDOL 5 MG/ML
1 INJECTION INTRAMUSCULAR
Status: DISCONTINUED | OUTPATIENT
Start: 2022-01-01 | End: 2022-01-01 | Stop reason: HOSPADM

## 2022-01-01 RX ORDER — BUDESONIDE AND FORMOTEROL FUMARATE DIHYDRATE 80; 4.5 UG/1; UG/1
2 AEROSOL RESPIRATORY (INHALATION)
Status: DISCONTINUED | OUTPATIENT
Start: 2022-01-01 | End: 2022-01-01 | Stop reason: HOSPADM

## 2022-01-01 RX ORDER — MAGNESIUM SULFATE HEPTAHYDRATE 40 MG/ML
2 INJECTION, SOLUTION INTRAVENOUS ONCE
Status: COMPLETED | OUTPATIENT
Start: 2022-01-01 | End: 2022-01-01

## 2022-01-01 RX ORDER — ATORVASTATIN CALCIUM 40 MG/1
40 TABLET, FILM COATED ORAL EVERY EVENING
Qty: 30 TABLET | Refills: 3 | Status: SHIPPED | OUTPATIENT
Start: 2022-01-01

## 2022-01-01 RX ORDER — ATORVASTATIN CALCIUM 40 MG/1
40 TABLET, FILM COATED ORAL EVERY EVENING
Status: DISCONTINUED | OUTPATIENT
Start: 2022-01-01 | End: 2022-01-01 | Stop reason: HOSPADM

## 2022-01-01 RX ORDER — HEPARIN SODIUM 1000 [USP'U]/ML
80 INJECTION, SOLUTION INTRAVENOUS; SUBCUTANEOUS ONCE
Status: COMPLETED | OUTPATIENT
Start: 2022-01-01 | End: 2022-01-01

## 2022-01-01 RX ORDER — LORAZEPAM 1 MG/1
4 TABLET ORAL
Status: CANCELLED | OUTPATIENT
Start: 2022-01-01

## 2022-01-01 RX ORDER — LORAZEPAM 2 MG/ML
2 INJECTION INTRAMUSCULAR
Status: DISCONTINUED | OUTPATIENT
Start: 2022-01-01 | End: 2022-01-01 | Stop reason: HOSPADM

## 2022-01-01 RX ORDER — BISACODYL 10 MG
10 SUPPOSITORY, RECTAL RECTAL
Status: DISCONTINUED | OUTPATIENT
Start: 2022-01-01 | End: 2022-01-01

## 2022-01-01 RX ORDER — SODIUM CHLORIDE 9 MG/ML
INJECTION, SOLUTION INTRAVENOUS CONTINUOUS
Status: CANCELLED | OUTPATIENT
Start: 2022-01-01

## 2022-01-01 RX ORDER — PREDNISONE 20 MG/1
40 TABLET ORAL DAILY
Qty: 8 TABLET | Refills: 0 | Status: SHIPPED | OUTPATIENT
Start: 2022-01-01 | End: 2022-10-14

## 2022-01-01 RX ORDER — GUAIFENESIN/DEXTROMETHORPHAN 100-10MG/5
10 SYRUP ORAL EVERY 6 HOURS PRN
Status: DISCONTINUED | OUTPATIENT
Start: 2022-01-01 | End: 2022-01-01 | Stop reason: HOSPADM

## 2022-01-01 RX ORDER — ONDANSETRON 2 MG/ML
4 INJECTION INTRAMUSCULAR; INTRAVENOUS PRN
Status: CANCELLED | OUTPATIENT
Start: 2022-01-01

## 2022-01-01 RX ORDER — DILTIAZEM HYDROCHLORIDE 300 MG/1
300 CAPSULE, COATED, EXTENDED RELEASE ORAL
Status: DISCONTINUED | OUTPATIENT
Start: 2022-01-01 | End: 2022-01-01 | Stop reason: HOSPADM

## 2022-01-01 RX ORDER — FOLIC ACID 1 MG/1
1 TABLET ORAL DAILY
Status: CANCELLED | OUTPATIENT
Start: 2022-01-01 | End: 2022-01-01

## 2022-01-01 RX ORDER — TRAZODONE HYDROCHLORIDE 100 MG/1
50 TABLET ORAL NIGHTLY
Status: DISCONTINUED | OUTPATIENT
Start: 2022-01-01 | End: 2022-01-01 | Stop reason: HOSPADM

## 2022-01-01 RX ORDER — LABETALOL HYDROCHLORIDE 5 MG/ML
10 INJECTION, SOLUTION INTRAVENOUS EVERY 4 HOURS PRN
Status: CANCELLED | OUTPATIENT
Start: 2022-01-01

## 2022-01-01 RX ORDER — GAUZE BANDAGE 2" X 2"
100 BANDAGE TOPICAL DAILY
Status: CANCELLED | OUTPATIENT
Start: 2022-01-01 | End: 2022-01-01

## 2022-01-01 RX ORDER — PHENYLEPHRINE HYDROCHLORIDE 10 MG/ML
INJECTION, SOLUTION INTRAMUSCULAR; INTRAVENOUS; SUBCUTANEOUS PRN
Status: DISCONTINUED | OUTPATIENT
Start: 2022-01-01 | End: 2022-01-01 | Stop reason: SURG

## 2022-01-01 RX ORDER — LORAZEPAM 1 MG/1
2 TABLET ORAL
Status: DISCONTINUED | OUTPATIENT
Start: 2022-01-01 | End: 2022-01-01 | Stop reason: HOSPADM

## 2022-01-01 RX ORDER — LEVALBUTEROL INHALATION SOLUTION 1.25 MG/3ML
1.25 SOLUTION RESPIRATORY (INHALATION)
Status: DISCONTINUED | OUTPATIENT
Start: 2022-01-01 | End: 2022-01-01 | Stop reason: HOSPADM

## 2022-01-01 RX ORDER — EPINEPHRINE 1 MG/ML(1)
0.5 AMPUL (ML) INJECTION PRN
Status: CANCELLED | OUTPATIENT
Start: 2022-01-01

## 2022-01-01 RX ORDER — LOSARTAN POTASSIUM 25 MG/1
12.5 TABLET ORAL
Status: DISCONTINUED | OUTPATIENT
Start: 2022-01-01 | End: 2022-01-01 | Stop reason: HOSPADM

## 2022-01-01 RX ORDER — METHYLPREDNISOLONE SODIUM SUCCINATE 125 MG/2ML
125 INJECTION, POWDER, LYOPHILIZED, FOR SOLUTION INTRAMUSCULAR; INTRAVENOUS PRN
Status: CANCELLED | OUTPATIENT
Start: 2022-01-01

## 2022-01-01 RX ORDER — PROMETHAZINE HYDROCHLORIDE 25 MG/1
12.5-25 TABLET ORAL EVERY 4 HOURS PRN
Status: DISCONTINUED | OUTPATIENT
Start: 2022-01-01 | End: 2022-01-01 | Stop reason: HOSPADM

## 2022-01-01 RX ORDER — ONDANSETRON 8 MG/1
8 TABLET, ORALLY DISINTEGRATING ORAL PRN
COMMUNITY
Start: 2022-01-01 | End: 2022-01-01

## 2022-01-01 RX ORDER — GAUZE BANDAGE 2" X 2"
100 BANDAGE TOPICAL DAILY
Status: DISCONTINUED | OUTPATIENT
Start: 2022-01-01 | End: 2022-01-01 | Stop reason: HOSPADM

## 2022-01-01 RX ORDER — PROCHLORPERAZINE EDISYLATE 5 MG/ML
5-10 INJECTION INTRAMUSCULAR; INTRAVENOUS EVERY 4 HOURS PRN
Status: DISCONTINUED | OUTPATIENT
Start: 2022-01-01 | End: 2022-01-01 | Stop reason: HOSPADM

## 2022-01-01 RX ORDER — ONDANSETRON 4 MG/1
4 TABLET, ORALLY DISINTEGRATING ORAL EVERY 4 HOURS PRN
Status: CANCELLED | OUTPATIENT
Start: 2022-01-01

## 2022-01-01 RX ORDER — IPRATROPIUM BROMIDE AND ALBUTEROL SULFATE 2.5; .5 MG/3ML; MG/3ML
3 SOLUTION RESPIRATORY (INHALATION)
Status: DISCONTINUED | OUTPATIENT
Start: 2022-01-01 | End: 2022-01-01

## 2022-01-01 RX ORDER — ONDANSETRON 2 MG/ML
4 INJECTION INTRAMUSCULAR; INTRAVENOUS EVERY 4 HOURS PRN
Status: CANCELLED | OUTPATIENT
Start: 2022-01-01

## 2022-01-01 RX ORDER — AZITHROMYCIN 250 MG/1
500 TABLET, FILM COATED ORAL EVERY EVENING
Status: COMPLETED | OUTPATIENT
Start: 2022-01-01 | End: 2022-01-01

## 2022-01-01 RX ORDER — LORAZEPAM 1 MG/1
1 TABLET ORAL EVERY 4 HOURS PRN
Status: DISCONTINUED | OUTPATIENT
Start: 2022-01-01 | End: 2022-01-01

## 2022-01-01 RX ORDER — BENZONATATE 100 MG/1
100 CAPSULE ORAL 3 TIMES DAILY PRN
Status: DISCONTINUED | OUTPATIENT
Start: 2022-01-01 | End: 2022-01-01 | Stop reason: HOSPADM

## 2022-01-01 RX ORDER — ENOXAPARIN SODIUM 100 MG/ML
1 INJECTION SUBCUTANEOUS EVERY 12 HOURS
Status: DISCONTINUED | OUTPATIENT
Start: 2022-01-01 | End: 2022-01-01

## 2022-01-01 RX ORDER — ONDANSETRON 4 MG/1
4 TABLET, ORALLY DISINTEGRATING ORAL EVERY 4 HOURS PRN
Status: DISCONTINUED | OUTPATIENT
Start: 2022-01-01 | End: 2022-01-01 | Stop reason: HOSPADM

## 2022-01-01 RX ORDER — METHYLPREDNISOLONE SODIUM SUCCINATE 40 MG/ML
40 INJECTION, POWDER, LYOPHILIZED, FOR SOLUTION INTRAMUSCULAR; INTRAVENOUS EVERY 8 HOURS
Status: DISCONTINUED | OUTPATIENT
Start: 2022-01-01 | End: 2022-01-01

## 2022-01-01 RX ORDER — BISACODYL 10 MG
10 SUPPOSITORY, RECTAL RECTAL
Status: DISCONTINUED | OUTPATIENT
Start: 2022-01-01 | End: 2022-01-01 | Stop reason: HOSPADM

## 2022-01-01 RX ORDER — METOPROLOL TARTRATE 50 MG/1
50 TABLET, FILM COATED ORAL 2 TIMES DAILY
Qty: 60 TABLET | Refills: 3 | Status: SHIPPED | OUTPATIENT
Start: 2022-01-01

## 2022-01-01 RX ORDER — NICOTINE 21 MG/24HR
21 PATCH, TRANSDERMAL 24 HOURS TRANSDERMAL
Status: CANCELLED | OUTPATIENT
Start: 2022-01-01

## 2022-01-01 RX ORDER — PREDNISONE 20 MG/1
40 TABLET ORAL DAILY
Status: DISCONTINUED | OUTPATIENT
Start: 2022-01-01 | End: 2022-01-01 | Stop reason: HOSPADM

## 2022-01-01 RX ORDER — POLYETHYLENE GLYCOL 3350 17 G/17G
1 POWDER, FOR SOLUTION ORAL
Status: DISCONTINUED | OUTPATIENT
Start: 2022-01-01 | End: 2022-01-01 | Stop reason: HOSPADM

## 2022-01-01 RX ORDER — METHYLPREDNISOLONE SODIUM SUCCINATE 40 MG/ML
40 INJECTION, POWDER, LYOPHILIZED, FOR SOLUTION INTRAMUSCULAR; INTRAVENOUS EVERY 12 HOURS
Status: DISCONTINUED | OUTPATIENT
Start: 2022-01-01 | End: 2022-01-01

## 2022-01-01 RX ORDER — OXYCODONE HYDROCHLORIDE AND ACETAMINOPHEN 5; 325 MG/1; MG/1
1 TABLET ORAL EVERY 4 HOURS PRN
Status: DISCONTINUED | OUTPATIENT
Start: 2022-01-01 | End: 2022-01-01 | Stop reason: HOSPADM

## 2022-01-01 RX ORDER — TRAZODONE HYDROCHLORIDE 50 MG/1
50 TABLET ORAL NIGHTLY
Status: DISCONTINUED | OUTPATIENT
Start: 2022-01-01 | End: 2022-01-01 | Stop reason: HOSPADM

## 2022-01-01 RX ORDER — BUDESONIDE AND FORMOTEROL FUMARATE DIHYDRATE 80; 4.5 UG/1; UG/1
2 AEROSOL RESPIRATORY (INHALATION)
Status: CANCELLED | OUTPATIENT
Start: 2022-01-01

## 2022-01-01 RX ORDER — HEPARIN SODIUM 5000 [USP'U]/100ML
0-30 INJECTION, SOLUTION INTRAVENOUS CONTINUOUS
Status: DISCONTINUED | OUTPATIENT
Start: 2022-01-01 | End: 2022-01-01

## 2022-01-01 RX ORDER — AMOXICILLIN 250 MG
2 CAPSULE ORAL 2 TIMES DAILY
Status: CANCELLED | OUTPATIENT
Start: 2022-01-01

## 2022-01-01 RX ORDER — LORAZEPAM 0.5 MG/1
0.5 TABLET ORAL EVERY 4 HOURS PRN
Status: DISCONTINUED | OUTPATIENT
Start: 2022-01-01 | End: 2022-01-01

## 2022-01-01 RX ORDER — IPRATROPIUM BROMIDE AND ALBUTEROL SULFATE 2.5; .5 MG/3ML; MG/3ML
3 SOLUTION RESPIRATORY (INHALATION)
Status: DISCONTINUED | OUTPATIENT
Start: 2022-01-01 | End: 2022-01-01 | Stop reason: HOSPADM

## 2022-01-01 RX ORDER — DILTIAZEM HYDROCHLORIDE 120 MG/1
240 CAPSULE, COATED, EXTENDED RELEASE ORAL
Status: DISCONTINUED | OUTPATIENT
Start: 2022-01-01 | End: 2022-01-01

## 2022-01-01 RX ORDER — PREDNISONE 20 MG/1
40 TABLET ORAL DAILY
Status: CANCELLED | OUTPATIENT
Start: 2022-01-01 | End: 2022-01-01

## 2022-01-01 RX ORDER — PROMETHAZINE HYDROCHLORIDE 25 MG/1
12.5-25 TABLET ORAL EVERY 4 HOURS PRN
Status: CANCELLED | OUTPATIENT
Start: 2022-01-01

## 2022-01-01 RX ORDER — ALPRAZOLAM 0.25 MG/1
0.25 TABLET ORAL
Status: COMPLETED | OUTPATIENT
Start: 2022-01-01 | End: 2022-01-01

## 2022-01-01 RX ORDER — CARVEDILOL 6.25 MG/1
6.25 TABLET ORAL 2 TIMES DAILY WITH MEALS
Status: DISCONTINUED | OUTPATIENT
Start: 2022-01-01 | End: 2022-01-01

## 2022-01-01 RX ORDER — LORAZEPAM 1 MG/1
3 TABLET ORAL
Status: DISCONTINUED | OUTPATIENT
Start: 2022-01-01 | End: 2022-01-01 | Stop reason: HOSPADM

## 2022-01-01 RX ORDER — AMOXICILLIN 250 MG
2 CAPSULE ORAL 2 TIMES DAILY
Status: DISCONTINUED | OUTPATIENT
Start: 2022-01-01 | End: 2022-01-01 | Stop reason: HOSPADM

## 2022-01-01 RX ORDER — TRAZODONE HYDROCHLORIDE 50 MG/1
50 TABLET ORAL
Status: DISCONTINUED | OUTPATIENT
Start: 2022-01-01 | End: 2022-01-01 | Stop reason: HOSPADM

## 2022-01-01 RX ORDER — LORAZEPAM 2 MG/1
4 TABLET ORAL
Status: DISCONTINUED | OUTPATIENT
Start: 2022-01-01 | End: 2022-01-01

## 2022-01-01 RX ORDER — BUDESONIDE AND FORMOTEROL FUMARATE DIHYDRATE 80; 4.5 UG/1; UG/1
2 AEROSOL RESPIRATORY (INHALATION) 2 TIMES DAILY
Qty: 10.2 G | Refills: 0 | Status: ON HOLD | OUTPATIENT
Start: 2022-01-01 | End: 2022-01-01 | Stop reason: SDUPTHER

## 2022-01-01 RX ORDER — PROCHLORPERAZINE MALEATE 10 MG
10 TABLET ORAL PRN
COMMUNITY
Start: 2022-01-01 | End: 2022-01-01

## 2022-01-01 RX ORDER — LOSARTAN POTASSIUM 25 MG/1
12.5 TABLET ORAL DAILY
Qty: 30 TABLET | Refills: 3 | Status: SHIPPED | OUTPATIENT
Start: 2022-01-01

## 2022-01-01 RX ORDER — LORAZEPAM 0.5 MG/1
0.5 TABLET ORAL EVERY 4 HOURS PRN
Status: DISCONTINUED | OUTPATIENT
Start: 2022-01-01 | End: 2022-01-01 | Stop reason: HOSPADM

## 2022-01-01 RX ORDER — BISACODYL 10 MG
10 SUPPOSITORY, RECTAL RECTAL
Status: CANCELLED | OUTPATIENT
Start: 2022-01-01

## 2022-01-01 RX ORDER — DILTIAZEM HYDROCHLORIDE 360 MG/1
360 CAPSULE, EXTENDED RELEASE ORAL DAILY
Qty: 30 CAPSULE | Refills: 0 | Status: SHIPPED | OUTPATIENT
Start: 2022-01-01 | End: 2022-01-01

## 2022-01-01 RX ORDER — ALBUTEROL SULFATE 90 UG/1
2 AEROSOL, METERED RESPIRATORY (INHALATION) EVERY 4 HOURS PRN
Qty: 18 G | Refills: 0 | Status: SHIPPED | OUTPATIENT
Start: 2022-01-01 | End: 2022-01-01

## 2022-01-01 RX ORDER — POLYETHYLENE GLYCOL 3350 17 G/17G
1 POWDER, FOR SOLUTION ORAL
Status: DISCONTINUED | OUTPATIENT
Start: 2022-01-01 | End: 2022-01-01

## 2022-01-01 RX ORDER — METOPROLOL TARTRATE 1 MG/ML
5 INJECTION, SOLUTION INTRAVENOUS
Status: DISCONTINUED | OUTPATIENT
Start: 2022-01-01 | End: 2022-01-01 | Stop reason: HOSPADM

## 2022-01-01 RX ORDER — AMOXICILLIN 250 MG
2 CAPSULE ORAL 2 TIMES DAILY
Status: DISCONTINUED | OUTPATIENT
Start: 2022-01-01 | End: 2022-01-01

## 2022-01-01 RX ORDER — POLYETHYLENE GLYCOL 3350 17 G/17G
1 POWDER, FOR SOLUTION ORAL
Status: CANCELLED | OUTPATIENT
Start: 2022-01-01

## 2022-01-01 RX ORDER — SODIUM CHLORIDE, SODIUM LACTATE, POTASSIUM CHLORIDE, CALCIUM CHLORIDE 600; 310; 30; 20 MG/100ML; MG/100ML; MG/100ML; MG/100ML
INJECTION, SOLUTION INTRAVENOUS CONTINUOUS
Status: DISCONTINUED | OUTPATIENT
Start: 2022-01-01 | End: 2022-01-01 | Stop reason: HOSPADM

## 2022-01-01 RX ORDER — ACETAMINOPHEN 325 MG/1
650 TABLET ORAL EVERY 6 HOURS PRN
Status: CANCELLED | OUTPATIENT
Start: 2022-01-01

## 2022-01-01 RX ORDER — GAUZE BANDAGE 2" X 2"
100 BANDAGE TOPICAL DAILY
Status: DISCONTINUED | OUTPATIENT
Start: 2022-01-01 | End: 2022-01-01

## 2022-01-01 RX ORDER — DILTIAZEM HYDROCHLORIDE 5 MG/ML
10 INJECTION INTRAVENOUS ONCE
Status: COMPLETED | OUTPATIENT
Start: 2022-01-01 | End: 2022-01-01

## 2022-01-01 RX ORDER — PROCHLORPERAZINE MALEATE 10 MG
10 TABLET ORAL EVERY 6 HOURS PRN
Status: CANCELLED | OUTPATIENT
Start: 2022-01-01

## 2022-01-01 RX ORDER — ONDANSETRON 2 MG/ML
4 INJECTION INTRAMUSCULAR; INTRAVENOUS
Status: DISCONTINUED | OUTPATIENT
Start: 2022-01-01 | End: 2022-01-01 | Stop reason: HOSPADM

## 2022-01-01 RX ORDER — LABETALOL HYDROCHLORIDE 5 MG/ML
10 INJECTION, SOLUTION INTRAVENOUS EVERY 4 HOURS PRN
Status: DISCONTINUED | OUTPATIENT
Start: 2022-01-01 | End: 2022-01-01 | Stop reason: HOSPADM

## 2022-01-01 RX ORDER — ONDANSETRON 8 MG/1
8 TABLET, ORALLY DISINTEGRATING ORAL PRN
Status: COMPLETED | OUTPATIENT
Start: 2022-01-01 | End: 2022-01-01

## 2022-01-01 RX ORDER — GAUZE BANDAGE 2" X 2"
100 BANDAGE TOPICAL DAILY
Status: COMPLETED | OUTPATIENT
Start: 2022-01-01 | End: 2022-01-01

## 2022-01-01 RX ORDER — LORAZEPAM 1 MG/1
1 TABLET ORAL EVERY 4 HOURS PRN
Status: DISCONTINUED | OUTPATIENT
Start: 2022-01-01 | End: 2022-01-01 | Stop reason: HOSPADM

## 2022-01-01 RX ORDER — FOLIC ACID 1 MG/1
1 TABLET ORAL DAILY
Status: DISCONTINUED | OUTPATIENT
Start: 2022-01-01 | End: 2022-01-01

## 2022-01-01 RX ORDER — SODIUM CHLORIDE 9 MG/ML
500 INJECTION, SOLUTION INTRAVENOUS ONCE
Status: COMPLETED | OUTPATIENT
Start: 2022-01-01 | End: 2022-01-01

## 2022-01-01 RX ORDER — LABETALOL HYDROCHLORIDE 5 MG/ML
10 INJECTION, SOLUTION INTRAVENOUS EVERY 4 HOURS PRN
Status: DISCONTINUED | OUTPATIENT
Start: 2022-01-01 | End: 2022-01-01

## 2022-01-01 RX ORDER — LORAZEPAM 0.5 MG/1
0.5 TABLET ORAL EVERY 4 HOURS PRN
Status: CANCELLED | OUTPATIENT
Start: 2022-01-01

## 2022-01-01 RX ORDER — METOPROLOL TARTRATE 50 MG/1
50 TABLET, FILM COATED ORAL ONCE
Status: COMPLETED | OUTPATIENT
Start: 2022-01-01 | End: 2022-01-01

## 2022-01-01 RX ORDER — PROMETHAZINE HYDROCHLORIDE 25 MG/1
12.5-25 SUPPOSITORY RECTAL EVERY 4 HOURS PRN
Status: CANCELLED | OUTPATIENT
Start: 2022-01-01

## 2022-01-01 RX ORDER — PROCHLORPERAZINE EDISYLATE 5 MG/ML
5-10 INJECTION INTRAMUSCULAR; INTRAVENOUS EVERY 4 HOURS PRN
Status: DISCONTINUED | OUTPATIENT
Start: 2022-01-01 | End: 2022-01-01

## 2022-01-01 RX ORDER — LORAZEPAM 1 MG/1
3 TABLET ORAL
Status: CANCELLED | OUTPATIENT
Start: 2022-01-01

## 2022-01-01 RX ORDER — PROMETHAZINE HYDROCHLORIDE 25 MG/1
12.5-25 SUPPOSITORY RECTAL EVERY 4 HOURS PRN
Status: DISCONTINUED | OUTPATIENT
Start: 2022-01-01 | End: 2022-01-01

## 2022-01-01 RX ORDER — GUAIFENESIN/DEXTROMETHORPHAN 100-10MG/5
10 SYRUP ORAL EVERY 6 HOURS PRN
Status: CANCELLED | OUTPATIENT
Start: 2022-01-01

## 2022-01-01 RX ORDER — BUDESONIDE AND FORMOTEROL FUMARATE DIHYDRATE 80; 4.5 UG/1; UG/1
2 AEROSOL RESPIRATORY (INHALATION) 2 TIMES DAILY
Qty: 1 EACH | Refills: 3 | Status: SHIPPED | OUTPATIENT
Start: 2022-01-01

## 2022-01-01 RX ORDER — PROCHLORPERAZINE EDISYLATE 5 MG/ML
5-10 INJECTION INTRAMUSCULAR; INTRAVENOUS EVERY 4 HOURS PRN
Status: CANCELLED | OUTPATIENT
Start: 2022-01-01

## 2022-01-01 RX ORDER — AMIODARONE HYDROCHLORIDE 200 MG/1
400 TABLET ORAL TWICE DAILY
Status: DISCONTINUED | OUTPATIENT
Start: 2022-01-01 | End: 2022-01-01

## 2022-01-01 RX ORDER — VENLAFAXINE HYDROCHLORIDE 75 MG/1
75 CAPSULE, EXTENDED RELEASE ORAL DAILY
COMMUNITY
Start: 2022-01-01 | End: 2022-01-01

## 2022-01-01 RX ORDER — DEXTROSE MONOHYDRATE 25 G/50ML
25 INJECTION, SOLUTION INTRAVENOUS
Status: DISCONTINUED | OUTPATIENT
Start: 2022-01-01 | End: 2022-01-01 | Stop reason: HOSPADM

## 2022-01-01 RX ORDER — PROMETHAZINE HYDROCHLORIDE 25 MG/1
12.5-25 SUPPOSITORY RECTAL EVERY 4 HOURS PRN
Status: DISCONTINUED | OUTPATIENT
Start: 2022-01-01 | End: 2022-01-01 | Stop reason: HOSPADM

## 2022-01-01 RX ORDER — LIDOCAINE HYDROCHLORIDE 40 MG/ML
SOLUTION TOPICAL PRN
Status: DISCONTINUED | OUTPATIENT
Start: 2022-01-01 | End: 2022-01-01 | Stop reason: SURG

## 2022-01-01 RX ORDER — IPRATROPIUM BROMIDE AND ALBUTEROL SULFATE 2.5; .5 MG/3ML; MG/3ML
3 SOLUTION RESPIRATORY (INHALATION)
Status: CANCELLED | OUTPATIENT
Start: 2022-01-01

## 2022-01-01 RX ORDER — METOPROLOL TARTRATE 50 MG/1
50 TABLET, FILM COATED ORAL TWICE DAILY
Status: DISCONTINUED | OUTPATIENT
Start: 2022-01-01 | End: 2022-01-01 | Stop reason: HOSPADM

## 2022-01-01 RX ORDER — METOPROLOL TARTRATE 1 MG/ML
5 INJECTION, SOLUTION INTRAVENOUS ONCE
Status: COMPLETED | OUTPATIENT
Start: 2022-01-01 | End: 2022-01-01

## 2022-01-01 RX ORDER — ALBUTEROL SULFATE 90 UG/1
2 AEROSOL, METERED RESPIRATORY (INHALATION) EVERY 4 HOURS PRN
Qty: 1 EACH | Refills: 3 | Status: SHIPPED | OUTPATIENT
Start: 2022-01-01

## 2022-01-01 RX ORDER — PROMETHAZINE HYDROCHLORIDE 25 MG/1
12.5-25 TABLET ORAL EVERY 4 HOURS PRN
Status: DISCONTINUED | OUTPATIENT
Start: 2022-01-01 | End: 2022-01-01

## 2022-01-01 RX ORDER — DILTIAZEM HYDROCHLORIDE 360 MG/1
360 CAPSULE, EXTENDED RELEASE ORAL DAILY
Status: DISCONTINUED | OUTPATIENT
Start: 2022-01-01 | End: 2022-01-01 | Stop reason: HOSPADM

## 2022-01-01 RX ORDER — LORAZEPAM 1 MG/1
4 TABLET ORAL
Status: DISCONTINUED | OUTPATIENT
Start: 2022-01-01 | End: 2022-01-01 | Stop reason: HOSPADM

## 2022-01-01 RX ORDER — CARVEDILOL 12.5 MG/1
12.5 TABLET ORAL 2 TIMES DAILY WITH MEALS
Status: DISCONTINUED | OUTPATIENT
Start: 2022-01-01 | End: 2022-01-01

## 2022-01-01 RX ORDER — NICOTINE 21 MG/24HR
21 PATCH, TRANSDERMAL 24 HOURS TRANSDERMAL
Status: DISCONTINUED | OUTPATIENT
Start: 2022-01-01 | End: 2022-01-01 | Stop reason: HOSPADM

## 2022-01-01 RX ORDER — ACETAMINOPHEN 325 MG/1
650 TABLET ORAL EVERY 6 HOURS PRN
Status: DISCONTINUED | OUTPATIENT
Start: 2022-01-01 | End: 2022-01-01

## 2022-01-01 RX ORDER — PREDNISONE 20 MG/1
TABLET ORAL
Qty: 2 TABLET | Refills: 0 | Status: ON HOLD | OUTPATIENT
Start: 2022-01-01 | End: 2022-01-01

## 2022-01-01 RX ORDER — FOLIC ACID 1 MG/1
1 TABLET ORAL DAILY
Status: COMPLETED | OUTPATIENT
Start: 2022-01-01 | End: 2022-01-01

## 2022-01-01 RX ORDER — OXYCODONE HYDROCHLORIDE AND ACETAMINOPHEN 5; 325 MG/1; MG/1
2 TABLET ORAL EVERY 4 HOURS PRN
Qty: 24 TABLET | Refills: 0 | Status: SHIPPED | OUTPATIENT
Start: 2022-01-01 | End: 2022-01-01

## 2022-01-01 RX ORDER — LORAZEPAM 2 MG/ML
2 INJECTION INTRAMUSCULAR
Status: DISCONTINUED | OUTPATIENT
Start: 2022-01-01 | End: 2022-01-01

## 2022-01-01 RX ORDER — OXYCODONE HYDROCHLORIDE AND ACETAMINOPHEN 5; 325 MG/1; MG/1
2 TABLET ORAL EVERY 4 HOURS PRN
Status: DISCONTINUED | OUTPATIENT
Start: 2022-01-01 | End: 2022-01-01 | Stop reason: HOSPADM

## 2022-01-01 RX ORDER — LORAZEPAM 1 MG/1
0.5 TABLET ORAL EVERY 4 HOURS PRN
Status: DISCONTINUED | OUTPATIENT
Start: 2022-01-01 | End: 2022-01-01 | Stop reason: HOSPADM

## 2022-01-01 RX ORDER — ALBUTEROL SULFATE 90 UG/1
2 AEROSOL, METERED RESPIRATORY (INHALATION) EVERY 4 HOURS PRN
Status: ON HOLD | COMMUNITY
End: 2022-01-01 | Stop reason: SDUPTHER

## 2022-01-01 RX ORDER — TRAZODONE HYDROCHLORIDE 50 MG/1
50 TABLET ORAL NIGHTLY
Status: CANCELLED | OUTPATIENT
Start: 2022-01-01

## 2022-01-01 RX ORDER — DILTIAZEM HYDROCHLORIDE 300 MG/1
300 CAPSULE, COATED, EXTENDED RELEASE ORAL DAILY
Qty: 30 CAPSULE | Refills: 0 | Status: ON HOLD | OUTPATIENT
Start: 2022-01-01 | End: 2022-01-01

## 2022-01-01 RX ORDER — METOPROLOL TARTRATE 1 MG/ML
5 INJECTION, SOLUTION INTRAVENOUS
Status: COMPLETED | OUTPATIENT
Start: 2022-01-01 | End: 2022-01-01

## 2022-01-01 RX ORDER — ONDANSETRON 2 MG/ML
INJECTION INTRAMUSCULAR; INTRAVENOUS PRN
Status: DISCONTINUED | OUTPATIENT
Start: 2022-01-01 | End: 2022-01-01 | Stop reason: SURG

## 2022-01-01 RX ORDER — AMOXICILLIN AND CLAVULANATE POTASSIUM 875; 125 MG/1; MG/1
1 TABLET, FILM COATED ORAL 2 TIMES DAILY
Qty: 10 TABLET | Refills: 0 | Status: ON HOLD | OUTPATIENT
Start: 2022-01-01 | End: 2022-01-01

## 2022-01-01 RX ORDER — MEPERIDINE HYDROCHLORIDE 25 MG/ML
12.5 INJECTION INTRAMUSCULAR; INTRAVENOUS; SUBCUTANEOUS
Status: DISCONTINUED | OUTPATIENT
Start: 2022-01-01 | End: 2022-01-01 | Stop reason: HOSPADM

## 2022-01-01 RX ORDER — LORAZEPAM 1 MG/1
2 TABLET ORAL
Status: CANCELLED | OUTPATIENT
Start: 2022-01-01

## 2022-01-01 RX ORDER — FUROSEMIDE 10 MG/ML
40 INJECTION INTRAMUSCULAR; INTRAVENOUS ONCE
Status: COMPLETED | OUTPATIENT
Start: 2022-01-01 | End: 2022-01-01

## 2022-01-01 RX ORDER — DILTIAZEM HYDROCHLORIDE 120 MG/1
240 CAPSULE, COATED, EXTENDED RELEASE ORAL
Status: DISCONTINUED | OUTPATIENT
Start: 2022-01-01 | End: 2022-01-01 | Stop reason: HOSPADM

## 2022-01-01 RX ORDER — LORAZEPAM 2 MG/ML
2 INJECTION INTRAMUSCULAR
Status: CANCELLED | OUTPATIENT
Start: 2022-01-01

## 2022-01-01 RX ORDER — CHOLECALCIFEROL (VITAMIN D3) 125 MCG
5 CAPSULE ORAL NIGHTLY
Status: DISCONTINUED | OUTPATIENT
Start: 2022-01-01 | End: 2022-01-01 | Stop reason: HOSPADM

## 2022-01-01 RX ORDER — LORAZEPAM 1 MG/1
1 TABLET ORAL EVERY 4 HOURS PRN
Status: CANCELLED | OUTPATIENT
Start: 2022-01-01

## 2022-01-01 RX ORDER — DEXAMETHASONE SODIUM PHOSPHATE 4 MG/ML
INJECTION, SOLUTION INTRA-ARTICULAR; INTRALESIONAL; INTRAMUSCULAR; INTRAVENOUS; SOFT TISSUE PRN
Status: DISCONTINUED | OUTPATIENT
Start: 2022-01-01 | End: 2022-01-01 | Stop reason: SURG

## 2022-01-01 RX ORDER — HEPARIN SODIUM 1000 [USP'U]/ML
40 INJECTION, SOLUTION INTRAVENOUS; SUBCUTANEOUS PRN
Status: DISCONTINUED | OUTPATIENT
Start: 2022-01-01 | End: 2022-01-01

## 2022-01-01 RX ORDER — LEVALBUTEROL INHALATION SOLUTION 1.25 MG/3ML
1.25 SOLUTION RESPIRATORY (INHALATION)
Status: DISCONTINUED | OUTPATIENT
Start: 2022-01-01 | End: 2022-01-01

## 2022-01-01 RX ORDER — ENOXAPARIN SODIUM 100 MG/ML
40 INJECTION SUBCUTANEOUS DAILY
Status: DISCONTINUED | OUTPATIENT
Start: 2022-01-01 | End: 2022-01-01 | Stop reason: HOSPADM

## 2022-01-01 RX ORDER — DOXYCYCLINE 100 MG/1
100 CAPSULE ORAL 2 TIMES DAILY
Qty: 10 CAPSULE | Refills: 0 | Status: ON HOLD | OUTPATIENT
Start: 2022-01-01 | End: 2022-01-01

## 2022-01-01 RX ORDER — DEXTROSE MONOHYDRATE 50 MG/ML
INJECTION, SOLUTION INTRAVENOUS CONTINUOUS
Status: DISCONTINUED | OUTPATIENT
Start: 2022-01-01 | End: 2022-01-01

## 2022-01-01 RX ORDER — LIDOCAINE HYDROCHLORIDE 20 MG/ML
INJECTION, SOLUTION EPIDURAL; INFILTRATION; INTRACAUDAL; PERINEURAL PRN
Status: DISCONTINUED | OUTPATIENT
Start: 2022-01-01 | End: 2022-01-01 | Stop reason: SURG

## 2022-01-01 RX ORDER — FOLIC ACID 1 MG/1
1 TABLET ORAL DAILY
Status: DISCONTINUED | OUTPATIENT
Start: 2022-01-01 | End: 2022-01-01 | Stop reason: HOSPADM

## 2022-01-01 RX ADMIN — OXYCODONE AND ACETAMINOPHEN 1 TABLET: 5; 325 TABLET ORAL at 13:58

## 2022-01-01 RX ADMIN — DEXAMETHASONE SODIUM PHOSPHATE 12 MG: 4 INJECTION, SOLUTION INTRA-ARTICULAR; INTRALESIONAL; INTRAMUSCULAR; INTRAVENOUS; SOFT TISSUE at 15:38

## 2022-01-01 RX ADMIN — FOLIC ACID 1 MG: 1 TABLET ORAL at 05:59

## 2022-01-01 RX ADMIN — LEVALBUTEROL 1.25 MG: 1.25 SOLUTION RESPIRATORY (INHALATION) at 15:19

## 2022-01-01 RX ADMIN — LEVALBUTEROL 1.25 MG: 1.25 SOLUTION RESPIRATORY (INHALATION) at 06:42

## 2022-01-01 RX ADMIN — THERA TABS 1 TABLET: TAB at 05:24

## 2022-01-01 RX ADMIN — THERA TABS 1 TABLET: TAB at 06:05

## 2022-01-01 RX ADMIN — THIAMINE HCL TAB 100 MG 100 MG: 100 TAB at 05:05

## 2022-01-01 RX ADMIN — IPRATROPIUM BROMIDE AND ALBUTEROL SULFATE 3 ML: 2.5; .5 SOLUTION RESPIRATORY (INHALATION) at 06:54

## 2022-01-01 RX ADMIN — RIVAROXABAN 15 MG: 15 TABLET, FILM COATED ORAL at 07:59

## 2022-01-01 RX ADMIN — METHYLPREDNISOLONE SODIUM SUCCINATE 40 MG: 40 INJECTION, POWDER, FOR SOLUTION INTRAMUSCULAR; INTRAVENOUS at 14:49

## 2022-01-01 RX ADMIN — LEVALBUTEROL 1.25 MG: 1.25 SOLUTION RESPIRATORY (INHALATION) at 14:18

## 2022-01-01 RX ADMIN — PACLITAXEL 93.6 MG: 6 INJECTION, SOLUTION INTRAVENOUS at 14:11

## 2022-01-01 RX ADMIN — THERA TABS 1 TABLET: TAB at 05:05

## 2022-01-01 RX ADMIN — FOLIC ACID 1 MG: 1 TABLET ORAL at 05:06

## 2022-01-01 RX ADMIN — CARVEDILOL 6.25 MG: 6.25 TABLET, FILM COATED ORAL at 17:31

## 2022-01-01 RX ADMIN — BENZONATATE 100 MG: 100 CAPSULE ORAL at 08:48

## 2022-01-01 RX ADMIN — SODIUM CHLORIDE 1000 ML: 9 INJECTION, SOLUTION INTRAVENOUS at 18:52

## 2022-01-01 RX ADMIN — OXYCODONE AND ACETAMINOPHEN 1 TABLET: 5; 325 TABLET ORAL at 00:21

## 2022-01-01 RX ADMIN — DIPHENHYDRAMINE HYDROCHLORIDE 25 MG: 50 INJECTION INTRAMUSCULAR; INTRAVENOUS at 13:35

## 2022-01-01 RX ADMIN — BUDESONIDE AND FORMOTEROL FUMARATE DIHYDRATE 2 PUFF: 80; 4.5 AEROSOL RESPIRATORY (INHALATION) at 19:53

## 2022-01-01 RX ADMIN — CEFTRIAXONE SODIUM 2 G: 10 INJECTION, POWDER, FOR SOLUTION INTRAVENOUS at 17:41

## 2022-01-01 RX ADMIN — IPRATROPIUM BROMIDE 0.5 MG: 0.5 SOLUTION RESPIRATORY (INHALATION) at 10:29

## 2022-01-01 RX ADMIN — OXYCODONE AND ACETAMINOPHEN 1 TABLET: 5; 325 TABLET ORAL at 19:45

## 2022-01-01 RX ADMIN — NICOTINE TRANSDERMAL SYSTEM 21 MG: 21 PATCH, EXTENDED RELEASE TRANSDERMAL at 10:21

## 2022-01-01 RX ADMIN — BUDESONIDE AND FORMOTEROL FUMARATE DIHYDRATE 2 PUFF: 80; 4.5 AEROSOL RESPIRATORY (INHALATION) at 20:41

## 2022-01-01 RX ADMIN — LEVALBUTEROL 1.25 MG: 1.25 SOLUTION RESPIRATORY (INHALATION) at 20:06

## 2022-01-01 RX ADMIN — Medication 5 MG: at 21:04

## 2022-01-01 RX ADMIN — ACETAMINOPHEN 650 MG: 325 TABLET, FILM COATED ORAL at 02:38

## 2022-01-01 RX ADMIN — BUDESONIDE AND FORMOTEROL FUMARATE DIHYDRATE 2 PUFF: 80; 4.5 AEROSOL RESPIRATORY (INHALATION) at 20:30

## 2022-01-01 RX ADMIN — SENNOSIDES AND DOCUSATE SODIUM 2 TABLET: 50; 8.6 TABLET ORAL at 17:31

## 2022-01-01 RX ADMIN — AMIODARONE HYDROCHLORIDE 400 MG: 200 TABLET ORAL at 17:46

## 2022-01-01 RX ADMIN — DEXTROSE MONOHYDRATE: 50 INJECTION, SOLUTION INTRAVENOUS at 23:17

## 2022-01-01 RX ADMIN — IPRATROPIUM BROMIDE 0.5 MG: 0.5 SOLUTION RESPIRATORY (INHALATION) at 19:43

## 2022-01-01 RX ADMIN — FOLIC ACID 1 MG: 1 TABLET ORAL at 05:25

## 2022-01-01 RX ADMIN — AMIODARONE HYDROCHLORIDE 400 MG: 200 TABLET ORAL at 17:31

## 2022-01-01 RX ADMIN — SENNOSIDES AND DOCUSATE SODIUM 2 TABLET: 50; 8.6 TABLET ORAL at 18:00

## 2022-01-01 RX ADMIN — METOPROLOL TARTRATE 50 MG: 50 TABLET, FILM COATED ORAL at 18:11

## 2022-01-01 RX ADMIN — AMIODARONE HYDROCHLORIDE 150 MG: 1.5 INJECTION, SOLUTION INTRAVENOUS at 22:08

## 2022-01-01 RX ADMIN — OXYCODONE AND ACETAMINOPHEN 1 TABLET: 5; 325 TABLET ORAL at 05:12

## 2022-01-01 RX ADMIN — METOPROLOL TARTRATE 50 MG: 50 TABLET, FILM COATED ORAL at 04:37

## 2022-01-01 RX ADMIN — INSULIN HUMAN 3 UNITS: 100 INJECTION, SOLUTION PARENTERAL at 18:02

## 2022-01-01 RX ADMIN — METHYLPREDNISOLONE SODIUM SUCCINATE 40 MG: 40 INJECTION, POWDER, FOR SOLUTION INTRAMUSCULAR; INTRAVENOUS at 21:27

## 2022-01-01 RX ADMIN — BUDESONIDE AND FORMOTEROL FUMARATE DIHYDRATE 2 PUFF: 80; 4.5 AEROSOL RESPIRATORY (INHALATION) at 19:14

## 2022-01-01 RX ADMIN — SENNOSIDES AND DOCUSATE SODIUM 2 TABLET: 50; 8.6 TABLET ORAL at 05:31

## 2022-01-01 RX ADMIN — AMIODARONE HYDROCHLORIDE 0.5 MG/MIN: 1.8 INJECTION, SOLUTION INTRAVENOUS at 17:23

## 2022-01-01 RX ADMIN — TRAZODONE HYDROCHLORIDE 50 MG: 50 TABLET ORAL at 22:20

## 2022-01-01 RX ADMIN — LEVALBUTEROL 1.25 MG: 1.25 SOLUTION RESPIRATORY (INHALATION) at 12:05

## 2022-01-01 RX ADMIN — PREDNISONE 40 MG: 20 TABLET ORAL at 04:51

## 2022-01-01 RX ADMIN — IOHEXOL 100 ML: 350 INJECTION, SOLUTION INTRAVENOUS at 10:16

## 2022-01-01 RX ADMIN — DILTIAZEM HYDROCHLORIDE 10 MG: 5 INJECTION INTRAVENOUS at 14:40

## 2022-01-01 RX ADMIN — DILTIAZEM HYDROCHLORIDE 240 MG: 120 CAPSULE, COATED, EXTENDED RELEASE ORAL at 05:59

## 2022-01-01 RX ADMIN — ONDANSETRON HYDROCHLORIDE 16 MG: 2 INJECTION, SOLUTION INTRAMUSCULAR; INTRAVENOUS at 13:15

## 2022-01-01 RX ADMIN — ACETAMINOPHEN 650 MG: 325 TABLET, FILM COATED ORAL at 09:56

## 2022-01-01 RX ADMIN — AMIODARONE HYDROCHLORIDE 0.5 MG/MIN: 1.8 INJECTION, SOLUTION INTRAVENOUS at 06:52

## 2022-01-01 RX ADMIN — IPRATROPIUM BROMIDE 0.5 MG: 0.5 SOLUTION RESPIRATORY (INHALATION) at 14:18

## 2022-01-01 RX ADMIN — DILTIAZEM HYDROCHLORIDE 5 MG/HR: 5 INJECTION INTRAVENOUS at 20:34

## 2022-01-01 RX ADMIN — RIVAROXABAN 15 MG: 15 TABLET, FILM COATED ORAL at 17:07

## 2022-01-01 RX ADMIN — DILTIAZEM HYDROCHLORIDE 300 MG: 180 CAPSULE, COATED, EXTENDED RELEASE ORAL at 06:04

## 2022-01-01 RX ADMIN — LEVALBUTEROL 1.25 MG: 1.25 SOLUTION RESPIRATORY (INHALATION) at 19:43

## 2022-01-01 RX ADMIN — Medication 5 MG: at 21:00

## 2022-01-01 RX ADMIN — IPRATROPIUM BROMIDE 0.5 MG: 0.5 SOLUTION RESPIRATORY (INHALATION) at 07:01

## 2022-01-01 RX ADMIN — SENNOSIDES AND DOCUSATE SODIUM 2 TABLET: 50; 8.6 TABLET ORAL at 04:51

## 2022-01-01 RX ADMIN — DEXAMETHASONE SODIUM PHOSPHATE 12 MG: 4 INJECTION, SOLUTION INTRAMUSCULAR; INTRAVENOUS at 13:35

## 2022-01-01 RX ADMIN — TRAZODONE HYDROCHLORIDE 50 MG: 100 TABLET ORAL at 21:56

## 2022-01-01 RX ADMIN — BUDESONIDE AND FORMOTEROL FUMARATE DIHYDRATE 2 PUFF: 80; 4.5 AEROSOL RESPIRATORY (INHALATION) at 06:35

## 2022-01-01 RX ADMIN — HEPARIN SODIUM 3000 UNITS: 1000 INJECTION, SOLUTION INTRAVENOUS; SUBCUTANEOUS at 02:26

## 2022-01-01 RX ADMIN — PREDNISONE 40 MG: 20 TABLET ORAL at 05:25

## 2022-01-01 RX ADMIN — BUDESONIDE AND FORMOTEROL FUMARATE DIHYDRATE 2 PUFF: 80; 4.5 AEROSOL RESPIRATORY (INHALATION) at 10:32

## 2022-01-01 RX ADMIN — RIVAROXABAN 15 MG: 15 TABLET, FILM COATED ORAL at 06:57

## 2022-01-01 RX ADMIN — ACETAMINOPHEN 325MG 650 MG: 325 TABLET ORAL at 15:41

## 2022-01-01 RX ADMIN — IPRATROPIUM BROMIDE AND ALBUTEROL SULFATE 3 ML: 2.5; .5 SOLUTION RESPIRATORY (INHALATION) at 19:12

## 2022-01-01 RX ADMIN — HEPARIN SODIUM 3000 UNITS: 1000 INJECTION, SOLUTION INTRAVENOUS; SUBCUTANEOUS at 17:20

## 2022-01-01 RX ADMIN — IPRATROPIUM BROMIDE AND ALBUTEROL SULFATE 3 ML: 2.5; .5 SOLUTION RESPIRATORY (INHALATION) at 07:06

## 2022-01-01 RX ADMIN — RIVAROXABAN 20 MG: 20 TABLET, FILM COATED ORAL at 17:30

## 2022-01-01 RX ADMIN — PREDNISONE 40 MG: 20 TABLET ORAL at 05:59

## 2022-01-01 RX ADMIN — ONDANSETRON 8 MG: 8 TABLET, ORALLY DISINTEGRATING ORAL at 17:13

## 2022-01-01 RX ADMIN — MAGNESIUM SULFATE HEPTAHYDRATE 2 G: 40 INJECTION, SOLUTION INTRAVENOUS at 04:39

## 2022-01-01 RX ADMIN — IPRATROPIUM BROMIDE AND ALBUTEROL SULFATE 3 ML: .5; 2.5 SOLUTION RESPIRATORY (INHALATION) at 16:06

## 2022-01-01 RX ADMIN — PHENYLEPHRINE HYDROCHLORIDE 100 MCG: 10 INJECTION INTRAVENOUS at 14:29

## 2022-01-01 RX ADMIN — IPRATROPIUM BROMIDE 0.5 MG: 0.5 SOLUTION RESPIRATORY (INHALATION) at 15:19

## 2022-01-01 RX ADMIN — NICOTINE TRANSDERMAL SYSTEM 21 MG: 21 PATCH, EXTENDED RELEASE TRANSDERMAL at 05:06

## 2022-01-01 RX ADMIN — ACETAMINOPHEN 650 MG: 325 TABLET, FILM COATED ORAL at 20:24

## 2022-01-01 RX ADMIN — BUDESONIDE AND FORMOTEROL FUMARATE DIHYDRATE 2 PUFF: 80; 4.5 AEROSOL RESPIRATORY (INHALATION) at 09:59

## 2022-01-01 RX ADMIN — HEPARIN SODIUM 18 UNITS/KG/HR: 5000 INJECTION, SOLUTION INTRAVENOUS at 19:58

## 2022-01-01 RX ADMIN — METHYLPREDNISOLONE SODIUM SUCCINATE 40 MG: 40 INJECTION, POWDER, FOR SOLUTION INTRAMUSCULAR; INTRAVENOUS at 21:56

## 2022-01-01 RX ADMIN — LEVALBUTEROL 1.25 MG: 1.25 SOLUTION RESPIRATORY (INHALATION) at 19:04

## 2022-01-01 RX ADMIN — BUDESONIDE AND FORMOTEROL FUMARATE DIHYDRATE 2 PUFF: 80; 4.5 AEROSOL RESPIRATORY (INHALATION) at 22:50

## 2022-01-01 RX ADMIN — CARBOPLATIN 234 MG: 10 INJECTION, SOLUTION INTRAVENOUS at 18:42

## 2022-01-01 RX ADMIN — Medication 5 MG: at 20:23

## 2022-01-01 RX ADMIN — PHENYLEPHRINE HYDROCHLORIDE 100 MCG: 10 INJECTION INTRAVENOUS at 14:39

## 2022-01-01 RX ADMIN — CEFTRIAXONE SODIUM 2 G: 2 INJECTION, POWDER, FOR SOLUTION INTRAMUSCULAR; INTRAVENOUS at 17:34

## 2022-01-01 RX ADMIN — BUDESONIDE AND FORMOTEROL FUMARATE DIHYDRATE 2 PUFF: 80; 4.5 AEROSOL RESPIRATORY (INHALATION) at 19:32

## 2022-01-01 RX ADMIN — IPRATROPIUM BROMIDE AND ALBUTEROL SULFATE 3 ML: 2.5; .5 SOLUTION RESPIRATORY (INHALATION) at 14:46

## 2022-01-01 RX ADMIN — AZITHROMYCIN MONOHYDRATE 500 MG: 250 TABLET ORAL at 21:37

## 2022-01-01 RX ADMIN — PREDNISONE 40 MG: 20 TABLET ORAL at 05:05

## 2022-01-01 RX ADMIN — RIVAROXABAN 15 MG: 15 TABLET, FILM COATED ORAL at 17:48

## 2022-01-01 RX ADMIN — METHYLPREDNISOLONE SODIUM SUCCINATE 40 MG: 40 INJECTION, POWDER, FOR SOLUTION INTRAMUSCULAR; INTRAVENOUS at 05:31

## 2022-01-01 RX ADMIN — TRAZODONE HYDROCHLORIDE 50 MG: 100 TABLET ORAL at 20:24

## 2022-01-01 RX ADMIN — INSULIN HUMAN 2 UNITS: 100 INJECTION, SOLUTION PARENTERAL at 18:19

## 2022-01-01 RX ADMIN — CEFTRIAXONE SODIUM 2 G: 10 INJECTION, POWDER, FOR SOLUTION INTRAVENOUS at 18:47

## 2022-01-01 RX ADMIN — NICOTINE TRANSDERMAL SYSTEM 21 MG: 21 PATCH, EXTENDED RELEASE TRANSDERMAL at 05:58

## 2022-01-01 RX ADMIN — LOSARTAN POTASSIUM 12.5 MG: 25 TABLET, FILM COATED ORAL at 04:37

## 2022-01-01 RX ADMIN — METHYLPREDNISOLONE SODIUM SUCCINATE 40 MG: 40 INJECTION, POWDER, FOR SOLUTION INTRAMUSCULAR; INTRAVENOUS at 05:04

## 2022-01-01 RX ADMIN — METOROPROLOL TARTRATE 5 MG: 5 INJECTION, SOLUTION INTRAVENOUS at 16:58

## 2022-01-01 RX ADMIN — FAMOTIDINE 20 MG: 10 INJECTION INTRAVENOUS at 13:02

## 2022-01-01 RX ADMIN — INSULIN HUMAN 2 UNITS: 100 INJECTION, SOLUTION PARENTERAL at 21:46

## 2022-01-01 RX ADMIN — IPRATROPIUM BROMIDE AND ALBUTEROL SULFATE 3 ML: 2.5; .5 SOLUTION RESPIRATORY (INHALATION) at 06:51

## 2022-01-01 RX ADMIN — BUDESONIDE AND FORMOTEROL FUMARATE DIHYDRATE 2 PUFF: 80; 4.5 AEROSOL RESPIRATORY (INHALATION) at 08:52

## 2022-01-01 RX ADMIN — Medication 5 MG: at 22:15

## 2022-01-01 RX ADMIN — METOPROLOL TARTRATE 50 MG: 50 TABLET, FILM COATED ORAL at 17:07

## 2022-01-01 RX ADMIN — OXYCODONE AND ACETAMINOPHEN 1 TABLET: 5; 325 TABLET ORAL at 12:17

## 2022-01-01 RX ADMIN — TRAZODONE HYDROCHLORIDE 50 MG: 50 TABLET ORAL at 20:31

## 2022-01-01 RX ADMIN — CEFTRIAXONE SODIUM 2 G: 2 INJECTION, POWDER, FOR SOLUTION INTRAMUSCULAR; INTRAVENOUS at 17:26

## 2022-01-01 RX ADMIN — METOPROLOL TARTRATE 50 MG: 50 TABLET, FILM COATED ORAL at 06:00

## 2022-01-01 RX ADMIN — FUROSEMIDE 40 MG: 10 INJECTION, SOLUTION INTRAMUSCULAR; INTRAVENOUS at 10:53

## 2022-01-01 RX ADMIN — INSULIN HUMAN 2 UNITS: 100 INJECTION, SOLUTION PARENTERAL at 23:15

## 2022-01-01 RX ADMIN — AMIODARONE HYDROCHLORIDE 400 MG: 200 TABLET ORAL at 18:34

## 2022-01-01 RX ADMIN — DILTIAZEM HYDROCHLORIDE 10 MG: 5 INJECTION, SOLUTION INTRAVENOUS at 12:47

## 2022-01-01 RX ADMIN — SENNOSIDES AND DOCUSATE SODIUM 2 TABLET: 50; 8.6 TABLET ORAL at 05:05

## 2022-01-01 RX ADMIN — MAGNESIUM SULFATE HEPTAHYDRATE 2 G: 40 INJECTION, SOLUTION INTRAVENOUS at 13:59

## 2022-01-01 RX ADMIN — IPRATROPIUM BROMIDE 0.5 MG: 0.5 SOLUTION RESPIRATORY (INHALATION) at 06:42

## 2022-01-01 RX ADMIN — AMIODARONE HYDROCHLORIDE 400 MG: 200 TABLET ORAL at 06:57

## 2022-01-01 RX ADMIN — LEVALBUTEROL 1.25 MG: 1.25 SOLUTION RESPIRATORY (INHALATION) at 07:01

## 2022-01-01 RX ADMIN — METOPROLOL TARTRATE 5 MG: 1 INJECTION, SOLUTION INTRAVENOUS at 08:33

## 2022-01-01 RX ADMIN — ACETAMINOPHEN 650 MG: 325 TABLET, FILM COATED ORAL at 02:03

## 2022-01-01 RX ADMIN — THERA TABS 1 TABLET: TAB at 05:16

## 2022-01-01 RX ADMIN — PREDNISONE 40 MG: 20 TABLET ORAL at 10:42

## 2022-01-01 RX ADMIN — RIVAROXABAN 15 MG: 15 TABLET, FILM COATED ORAL at 05:32

## 2022-01-01 RX ADMIN — FUROSEMIDE 20 MG: 10 INJECTION, SOLUTION INTRAMUSCULAR; INTRAVENOUS at 10:42

## 2022-01-01 RX ADMIN — IPRATROPIUM BROMIDE AND ALBUTEROL SULFATE 3 ML: 2.5; .5 SOLUTION RESPIRATORY (INHALATION) at 10:34

## 2022-01-01 RX ADMIN — HUMAN ALBUMIN MICROSPHERES AND PERFLUTREN 3 ML: 10; .22 INJECTION, SOLUTION INTRAVENOUS at 08:41

## 2022-01-01 RX ADMIN — ACETAMINOPHEN 650 MG: 325 TABLET, FILM COATED ORAL at 09:01

## 2022-01-01 RX ADMIN — SODIUM CHLORIDE 1000 ML: 9 INJECTION, SOLUTION INTRAVENOUS at 14:40

## 2022-01-01 RX ADMIN — BUDESONIDE AND FORMOTEROL FUMARATE DIHYDRATE 2 PUFF: 80; 4.5 AEROSOL RESPIRATORY (INHALATION) at 20:42

## 2022-01-01 RX ADMIN — SENNOSIDES AND DOCUSATE SODIUM 2 TABLET: 50; 8.6 TABLET ORAL at 18:47

## 2022-01-01 RX ADMIN — TRAZODONE HYDROCHLORIDE 50 MG: 50 TABLET ORAL at 20:47

## 2022-01-01 RX ADMIN — CARBOPLATIN 280 MG: 10 INJECTION, SOLUTION INTRAVENOUS at 15:30

## 2022-01-01 RX ADMIN — NICOTINE TRANSDERMAL SYSTEM 21 MG: 21 PATCH, EXTENDED RELEASE TRANSDERMAL at 05:17

## 2022-01-01 RX ADMIN — DILTIAZEM HYDROCHLORIDE 10 MG: 5 INJECTION INTRAVENOUS at 16:19

## 2022-01-01 RX ADMIN — TRAZODONE HYDROCHLORIDE 50 MG: 50 TABLET ORAL at 20:23

## 2022-01-01 RX ADMIN — MAGNESIUM SULFATE HEPTAHYDRATE 2 G: 40 INJECTION, SOLUTION INTRAVENOUS at 05:42

## 2022-01-01 RX ADMIN — HEPARIN SODIUM 22 UNITS/KG/HR: 5000 INJECTION, SOLUTION INTRAVENOUS at 12:10

## 2022-01-01 RX ADMIN — IPRATROPIUM BROMIDE AND ALBUTEROL SULFATE 3 ML: 2.5; .5 SOLUTION RESPIRATORY (INHALATION) at 23:30

## 2022-01-01 RX ADMIN — PHENYLEPHRINE HYDROCHLORIDE 100 MCG: 10 INJECTION INTRAVENOUS at 14:12

## 2022-01-01 RX ADMIN — AMIODARONE HYDROCHLORIDE 1 MG/MIN: 1.8 INJECTION, SOLUTION INTRAVENOUS at 05:03

## 2022-01-01 RX ADMIN — INSULIN HUMAN 1 UNITS: 100 INJECTION, SOLUTION PARENTERAL at 23:10

## 2022-01-01 RX ADMIN — IPRATROPIUM BROMIDE AND ALBUTEROL SULFATE 3 ML: 2.5; .5 SOLUTION RESPIRATORY (INHALATION) at 22:35

## 2022-01-01 RX ADMIN — ENOXAPARIN SODIUM 40 MG: 40 INJECTION SUBCUTANEOUS at 17:41

## 2022-01-01 RX ADMIN — ALPRAZOLAM 0.25 MG: 0.25 TABLET ORAL at 14:46

## 2022-01-01 RX ADMIN — IPRATROPIUM BROMIDE AND ALBUTEROL SULFATE 3 ML: 2.5; .5 SOLUTION RESPIRATORY (INHALATION) at 07:25

## 2022-01-01 RX ADMIN — TRAZODONE HYDROCHLORIDE 50 MG: 100 TABLET ORAL at 19:48

## 2022-01-01 RX ADMIN — TRAZODONE HYDROCHLORIDE 50 MG: 50 TABLET ORAL at 20:33

## 2022-01-01 RX ADMIN — LEVALBUTEROL 1.25 MG: 1.25 SOLUTION RESPIRATORY (INHALATION) at 10:01

## 2022-01-01 RX ADMIN — CEFTRIAXONE SODIUM 2 G: 2 INJECTION, POWDER, FOR SOLUTION INTRAMUSCULAR; INTRAVENOUS at 21:37

## 2022-01-01 RX ADMIN — BENZONATATE 100 MG: 100 CAPSULE ORAL at 21:06

## 2022-01-01 RX ADMIN — DIPHENHYDRAMINE HYDROCHLORIDE 25 MG: 50 INJECTION, SOLUTION INTRAMUSCULAR; INTRAVENOUS at 13:50

## 2022-01-01 RX ADMIN — CEFTRIAXONE SODIUM 2 G: 2 INJECTION, POWDER, FOR SOLUTION INTRAMUSCULAR; INTRAVENOUS at 18:00

## 2022-01-01 RX ADMIN — IPRATROPIUM BROMIDE 0.5 MG: 0.5 SOLUTION RESPIRATORY (INHALATION) at 20:06

## 2022-01-01 RX ADMIN — TRAZODONE HYDROCHLORIDE 50 MG: 50 TABLET ORAL at 20:17

## 2022-01-01 RX ADMIN — IPRATROPIUM BROMIDE AND ALBUTEROL SULFATE 3 ML: 2.5; .5 SOLUTION RESPIRATORY (INHALATION) at 11:00

## 2022-01-01 RX ADMIN — AMIODARONE HYDROCHLORIDE 150 MG: 1.5 INJECTION, SOLUTION INTRAVENOUS at 23:19

## 2022-01-01 RX ADMIN — CARVEDILOL 6.25 MG: 6.25 TABLET, FILM COATED ORAL at 06:57

## 2022-01-01 RX ADMIN — IPRATROPIUM BROMIDE 0.5 MG: 0.5 SOLUTION RESPIRATORY (INHALATION) at 12:05

## 2022-01-01 RX ADMIN — ACETAMINOPHEN 325MG 650 MG: 325 TABLET ORAL at 19:48

## 2022-01-01 RX ADMIN — PACLITAXEL 93.2 MG: 6 INJECTION, SOLUTION INTRAVENOUS at 16:05

## 2022-01-01 RX ADMIN — DILTIAZEM HYDROCHLORIDE 240 MG: 120 CAPSULE, COATED, EXTENDED RELEASE ORAL at 05:24

## 2022-01-01 RX ADMIN — IPRATROPIUM BROMIDE 0.5 MG: 0.5 SOLUTION RESPIRATORY (INHALATION) at 19:04

## 2022-01-01 RX ADMIN — CARBOPLATIN 260 MG: 10 INJECTION, SOLUTION INTRAVENOUS at 16:36

## 2022-01-01 RX ADMIN — HEPARIN SODIUM 26 UNITS/KG/HR: 5000 INJECTION, SOLUTION INTRAVENOUS at 03:37

## 2022-01-01 RX ADMIN — ENOXAPARIN SODIUM 40 MG: 40 INJECTION SUBCUTANEOUS at 17:33

## 2022-01-01 RX ADMIN — ACETAMINOPHEN 650 MG: 325 TABLET, FILM COATED ORAL at 05:31

## 2022-01-01 RX ADMIN — PREDNISONE 40 MG: 20 TABLET ORAL at 06:57

## 2022-01-01 RX ADMIN — SUGAMMADEX 200 MG: 100 INJECTION, SOLUTION INTRAVENOUS at 14:46

## 2022-01-01 RX ADMIN — NICOTINE TRANSDERMAL SYSTEM 21 MG: 21 PATCH, EXTENDED RELEASE TRANSDERMAL at 05:25

## 2022-01-01 RX ADMIN — NICOTINE TRANSDERMAL SYSTEM 21 MG: 21 PATCH, EXTENDED RELEASE TRANSDERMAL at 05:55

## 2022-01-01 RX ADMIN — FENTANYL CITRATE 100 MCG: 50 INJECTION, SOLUTION INTRAMUSCULAR; INTRAVENOUS at 13:50

## 2022-01-01 RX ADMIN — RIVAROXABAN 15 MG: 15 TABLET, FILM COATED ORAL at 17:31

## 2022-01-01 RX ADMIN — SODIUM CHLORIDE, POTASSIUM CHLORIDE, SODIUM LACTATE AND CALCIUM CHLORIDE: 600; 310; 30; 20 INJECTION, SOLUTION INTRAVENOUS at 13:46

## 2022-01-01 RX ADMIN — THERA TABS 1 TABLET: TAB at 05:59

## 2022-01-01 RX ADMIN — Medication 5 MG: at 21:28

## 2022-01-01 RX ADMIN — LEVALBUTEROL 1.25 MG: 1.25 SOLUTION RESPIRATORY (INHALATION) at 20:33

## 2022-01-01 RX ADMIN — THIAMINE HYDROCHLORIDE: 100 INJECTION, SOLUTION INTRAMUSCULAR; INTRAVENOUS at 21:48

## 2022-01-01 RX ADMIN — DEXAMETHASONE SODIUM PHOSPHATE 12 MG: 4 INJECTION, SOLUTION INTRA-ARTICULAR; INTRALESIONAL; INTRAMUSCULAR; INTRAVENOUS; SOFT TISSUE at 13:30

## 2022-01-01 RX ADMIN — CEFTRIAXONE SODIUM 2 G: 2 INJECTION, POWDER, FOR SOLUTION INTRAMUSCULAR; INTRAVENOUS at 17:31

## 2022-01-01 RX ADMIN — RIVAROXABAN 15 MG: 15 TABLET, FILM COATED ORAL at 18:34

## 2022-01-01 RX ADMIN — TRAZODONE HYDROCHLORIDE 50 MG: 50 TABLET ORAL at 21:05

## 2022-01-01 RX ADMIN — PREDNISONE 40 MG: 20 TABLET ORAL at 05:17

## 2022-01-01 RX ADMIN — DIPHENHYDRAMINE HYDROCHLORIDE 25 MG: 50 INJECTION, SOLUTION INTRAMUSCULAR; INTRAVENOUS at 15:09

## 2022-01-01 RX ADMIN — ONDANSETRON 4 MG: 2 INJECTION INTRAMUSCULAR; INTRAVENOUS at 14:40

## 2022-01-01 RX ADMIN — METOPROLOL TARTRATE 5 MG: 1 INJECTION, SOLUTION INTRAVENOUS at 06:51

## 2022-01-01 RX ADMIN — DEXTROSE MONOHYDRATE 30 ML: 50 INJECTION, SOLUTION INTRAVENOUS at 19:37

## 2022-01-01 RX ADMIN — FOLIC ACID 1 MG: 1 TABLET ORAL at 06:06

## 2022-01-01 RX ADMIN — ONDANSETRON HYDROCHLORIDE 16 MG: 2 INJECTION, SOLUTION INTRAMUSCULAR; INTRAVENOUS at 14:05

## 2022-01-01 RX ADMIN — OXYCODONE AND ACETAMINOPHEN 1 TABLET: 5; 325 TABLET ORAL at 09:22

## 2022-01-01 RX ADMIN — THIAMINE HCL TAB 100 MG 100 MG: 100 TAB at 05:16

## 2022-01-01 RX ADMIN — BENZONATATE 100 MG: 100 CAPSULE ORAL at 09:01

## 2022-01-01 RX ADMIN — DEXAMETHASONE SODIUM PHOSPHATE 8 MG: 4 INJECTION, SOLUTION INTRA-ARTICULAR; INTRALESIONAL; INTRAMUSCULAR; INTRAVENOUS; SOFT TISSUE at 13:51

## 2022-01-01 RX ADMIN — IPRATROPIUM BROMIDE 0.5 MG: 0.5 SOLUTION RESPIRATORY (INHALATION) at 10:01

## 2022-01-01 RX ADMIN — BUDESONIDE AND FORMOTEROL FUMARATE DIHYDRATE 2 PUFF: 80; 4.5 AEROSOL RESPIRATORY (INHALATION) at 07:26

## 2022-01-01 RX ADMIN — ATORVASTATIN CALCIUM 40 MG: 40 TABLET, FILM COATED ORAL at 17:07

## 2022-01-01 RX ADMIN — DIPHENHYDRAMINE HYDROCHLORIDE 25 MG: 50 INJECTION INTRAMUSCULAR; INTRAVENOUS at 13:05

## 2022-01-01 RX ADMIN — RIVAROXABAN 15 MG: 15 TABLET, FILM COATED ORAL at 06:50

## 2022-01-01 RX ADMIN — IOHEXOL 75 ML: 350 INJECTION, SOLUTION INTRAVENOUS at 18:12

## 2022-01-01 RX ADMIN — Medication 100 MG: at 06:05

## 2022-01-01 RX ADMIN — METHYLPREDNISOLONE SODIUM SUCCINATE 40 MG: 40 INJECTION, POWDER, FOR SOLUTION INTRAMUSCULAR; INTRAVENOUS at 13:31

## 2022-01-01 RX ADMIN — GADOTERIDOL 20 ML: 279.3 INJECTION, SOLUTION INTRAVENOUS at 15:22

## 2022-01-01 RX ADMIN — ENOXAPARIN SODIUM 40 MG: 40 INJECTION SUBCUTANEOUS at 18:47

## 2022-01-01 RX ADMIN — TRAZODONE HYDROCHLORIDE 50 MG: 50 TABLET ORAL at 20:05

## 2022-01-01 RX ADMIN — METHYLPREDNISOLONE SODIUM SUCCINATE 40 MG: 40 INJECTION, POWDER, FOR SOLUTION INTRAMUSCULAR; INTRAVENOUS at 17:47

## 2022-01-01 RX ADMIN — PREDNISONE 40 MG: 20 TABLET ORAL at 04:37

## 2022-01-01 RX ADMIN — SODIUM CHLORIDE 500 ML: 9 INJECTION, SOLUTION INTRAVENOUS at 21:42

## 2022-01-01 RX ADMIN — IPRATROPIUM BROMIDE AND ALBUTEROL SULFATE 3 ML: 2.5; .5 SOLUTION RESPIRATORY (INHALATION) at 20:41

## 2022-01-01 RX ADMIN — RIVAROXABAN 20 MG: 20 TABLET, FILM COATED ORAL at 17:34

## 2022-01-01 RX ADMIN — THIAMINE HCL TAB 100 MG 100 MG: 100 TAB at 05:25

## 2022-01-01 RX ADMIN — IPRATROPIUM BROMIDE 0.5 MG: 0.5 SOLUTION RESPIRATORY (INHALATION) at 20:33

## 2022-01-01 RX ADMIN — ROCURONIUM BROMIDE 70 MG: 10 INJECTION, SOLUTION INTRAVENOUS at 13:50

## 2022-01-01 RX ADMIN — ACETAMINOPHEN 650 MG: 325 TABLET, FILM COATED ORAL at 09:06

## 2022-01-01 RX ADMIN — DILTIAZEM HYDROCHLORIDE 360 MG: 360 CAPSULE, EXTENDED RELEASE ORAL at 05:56

## 2022-01-01 RX ADMIN — METOPROLOL TARTRATE 50 MG: 50 TABLET, FILM COATED ORAL at 13:58

## 2022-01-01 RX ADMIN — LIDOCAINE HYDROCHLORIDE 80 MG: 20 INJECTION, SOLUTION EPIDURAL; INFILTRATION; INTRACAUDAL at 13:50

## 2022-01-01 RX ADMIN — PHENYLEPHRINE HYDROCHLORIDE 100 MCG: 10 INJECTION INTRAVENOUS at 14:09

## 2022-01-01 RX ADMIN — ACETAMINOPHEN 650 MG: 325 TABLET, FILM COATED ORAL at 19:28

## 2022-01-01 RX ADMIN — INSULIN HUMAN 1 UNITS: 100 INJECTION, SOLUTION PARENTERAL at 12:10

## 2022-01-01 RX ADMIN — BUDESONIDE AND FORMOTEROL FUMARATE DIHYDRATE 2 PUFF: 80; 4.5 AEROSOL RESPIRATORY (INHALATION) at 19:47

## 2022-01-01 RX ADMIN — PHENYLEPHRINE HYDROCHLORIDE 100 MCG: 10 INJECTION INTRAVENOUS at 14:21

## 2022-01-01 RX ADMIN — DILTIAZEM HYDROCHLORIDE 240 MG: 120 CAPSULE, COATED, EXTENDED RELEASE ORAL at 05:06

## 2022-01-01 RX ADMIN — AMIODARONE HYDROCHLORIDE 1 MG/MIN: 1.8 INJECTION, SOLUTION INTRAVENOUS at 23:23

## 2022-01-01 RX ADMIN — FOLIC ACID 1 MG: 1 TABLET ORAL at 05:16

## 2022-01-01 RX ADMIN — MIDAZOLAM 2 MG: 1 INJECTION INTRAMUSCULAR; INTRAVENOUS at 13:46

## 2022-01-01 RX ADMIN — THIAMINE HCL TAB 100 MG 100 MG: 100 TAB at 05:59

## 2022-01-01 RX ADMIN — BENZONATATE 100 MG: 100 CAPSULE ORAL at 17:20

## 2022-01-01 RX ADMIN — FAMOTIDINE 20 MG: 10 INJECTION INTRAVENOUS at 13:35

## 2022-01-01 RX ADMIN — TRAZODONE HYDROCHLORIDE 50 MG: 50 TABLET ORAL at 20:53

## 2022-01-01 RX ADMIN — IPRATROPIUM BROMIDE AND ALBUTEROL SULFATE 3 ML: 2.5; .5 SOLUTION RESPIRATORY (INHALATION) at 19:53

## 2022-01-01 RX ADMIN — HEPARIN SODIUM 6000 UNITS: 1000 INJECTION, SOLUTION INTRAVENOUS; SUBCUTANEOUS at 19:52

## 2022-01-01 RX ADMIN — BUDESONIDE AND FORMOTEROL FUMARATE DIHYDRATE 2 PUFF: 80; 4.5 AEROSOL RESPIRATORY (INHALATION) at 07:06

## 2022-01-01 RX ADMIN — AZITHROMYCIN MONOHYDRATE 500 MG: 250 TABLET ORAL at 17:26

## 2022-01-01 RX ADMIN — INSULIN HUMAN 1 UNITS: 100 INJECTION, SOLUTION PARENTERAL at 12:19

## 2022-01-01 RX ADMIN — ACETAMINOPHEN 650 MG: 325 TABLET, FILM COATED ORAL at 21:11

## 2022-01-01 RX ADMIN — AMIODARONE HYDROCHLORIDE 400 MG: 200 TABLET ORAL at 04:39

## 2022-01-01 RX ADMIN — PROPOFOL 150 MG: 10 INJECTION, EMULSION INTRAVENOUS at 13:50

## 2022-01-01 RX ADMIN — Medication 5 MG: at 21:56

## 2022-01-01 RX ADMIN — DILTIAZEM HYDROCHLORIDE 300 MG: 300 CAPSULE, COATED, EXTENDED RELEASE ORAL at 05:16

## 2022-01-01 RX ADMIN — Medication 5 MG: at 20:18

## 2022-01-01 RX ADMIN — TRAZODONE HYDROCHLORIDE 50 MG: 50 TABLET ORAL at 21:37

## 2022-01-01 RX ADMIN — SODIUM CHLORIDE 500 ML: 9 INJECTION, SOLUTION INTRAVENOUS at 08:20

## 2022-01-01 RX ADMIN — ONDANSETRON 16 MG: 2 INJECTION INTRAMUSCULAR; INTRAVENOUS at 15:22

## 2022-01-01 RX ADMIN — IPRATROPIUM BROMIDE 0.5 MG: 0.5 SOLUTION RESPIRATORY (INHALATION) at 16:25

## 2022-01-01 RX ADMIN — SENNOSIDES AND DOCUSATE SODIUM 2 TABLET: 50; 8.6 TABLET ORAL at 17:41

## 2022-01-01 RX ADMIN — SENNOSIDES AND DOCUSATE SODIUM 2 TABLET: 50; 8.6 TABLET ORAL at 17:46

## 2022-01-01 RX ADMIN — AZITHROMYCIN MONOHYDRATE 500 MG: 250 TABLET ORAL at 18:01

## 2022-01-01 RX ADMIN — DILTIAZEM HYDROCHLORIDE 10 MG: 5 INJECTION INTRAVENOUS at 15:31

## 2022-01-01 RX ADMIN — AMIODARONE HYDROCHLORIDE 400 MG: 200 TABLET ORAL at 05:31

## 2022-01-01 RX ADMIN — LOSARTAN POTASSIUM 12.5 MG: 25 TABLET, FILM COATED ORAL at 11:31

## 2022-01-01 RX ADMIN — DILTIAZEM HYDROCHLORIDE 360 MG: 360 CAPSULE, EXTENDED RELEASE ORAL at 05:13

## 2022-01-01 RX ADMIN — DEXAMETHASONE SODIUM PHOSPHATE 12 MG: 4 INJECTION, SOLUTION INTRA-ARTICULAR; INTRALESIONAL; INTRAMUSCULAR; INTRAVENOUS; SOFT TISSUE at 13:00

## 2022-01-01 RX ADMIN — LEVALBUTEROL 1.25 MG: 1.25 SOLUTION RESPIRATORY (INHALATION) at 16:25

## 2022-01-01 RX ADMIN — TRAZODONE HYDROCHLORIDE 50 MG: 50 TABLET ORAL at 21:27

## 2022-01-01 RX ADMIN — IPRATROPIUM BROMIDE 0.5 MG: 0.5 SOLUTION RESPIRATORY (INHALATION) at 07:17

## 2022-01-01 RX ADMIN — CARBOPLATIN 298 MG: 10 INJECTION, SOLUTION INTRAVENOUS at 16:13

## 2022-01-01 RX ADMIN — LIDOCAINE HYDROCHLORIDE 4 ML: 40 SOLUTION TOPICAL at 13:56

## 2022-01-01 RX ADMIN — METHYLPREDNISOLONE SODIUM SUCCINATE 40 MG: 40 INJECTION, POWDER, FOR SOLUTION INTRAMUSCULAR; INTRAVENOUS at 22:15

## 2022-01-01 RX ADMIN — RIVAROXABAN 15 MG: 15 TABLET, FILM COATED ORAL at 18:13

## 2022-01-01 RX ADMIN — BUDESONIDE AND FORMOTEROL FUMARATE DIHYDRATE 2 PUFF: 80; 4.5 AEROSOL RESPIRATORY (INHALATION) at 06:51

## 2022-01-01 RX ADMIN — DILTIAZEM HYDROCHLORIDE 360 MG: 360 CAPSULE, EXTENDED RELEASE ORAL at 09:03

## 2022-01-01 RX ADMIN — NICOTINE TRANSDERMAL SYSTEM 21 MG: 21 PATCH, EXTENDED RELEASE TRANSDERMAL at 05:59

## 2022-01-01 RX ADMIN — FAMOTIDINE 20 MG: 10 INJECTION INTRAVENOUS at 12:55

## 2022-01-01 RX ADMIN — ACETAMINOPHEN 650 MG: 325 TABLET, FILM COATED ORAL at 13:31

## 2022-01-01 RX ADMIN — METHYLPREDNISOLONE SODIUM SUCCINATE 40 MG: 40 INJECTION, POWDER, FOR SOLUTION INTRAMUSCULAR; INTRAVENOUS at 04:39

## 2022-01-01 RX ADMIN — RIVAROXABAN 15 MG: 15 TABLET, FILM COATED ORAL at 08:33

## 2022-01-01 RX ADMIN — NICOTINE TRANSDERMAL SYSTEM 21 MG: 21 PATCH, EXTENDED RELEASE TRANSDERMAL at 05:13

## 2022-01-01 RX ADMIN — METHYLPREDNISOLONE SODIUM SUCCINATE 40 MG: 40 INJECTION, POWDER, FOR SOLUTION INTRAMUSCULAR; INTRAVENOUS at 04:42

## 2022-01-01 RX ADMIN — METOPROLOL TARTRATE 5 MG: 1 INJECTION, SOLUTION INTRAVENOUS at 16:37

## 2022-01-01 RX ADMIN — PACLITAXEL 94.1 MG: 6 INJECTION, SOLUTION INTRAVENOUS at 14:46

## 2022-01-01 RX ADMIN — FAMOTIDINE 20 MG: 10 INJECTION INTRAVENOUS at 15:09

## 2022-01-01 RX ADMIN — ACETAMINOPHEN 650 MG: 325 TABLET, FILM COATED ORAL at 22:49

## 2022-01-01 ASSESSMENT — COGNITIVE AND FUNCTIONAL STATUS - GENERAL
MOVING FROM LYING ON BACK TO SITTING ON SIDE OF FLAT BED: A LITTLE
MOBILITY SCORE: 24
MOVING FROM LYING ON BACK TO SITTING ON SIDE OF FLAT BED: A LITTLE
SUGGESTED CMS G CODE MODIFIER MOBILITY: CH
SUGGESTED CMS G CODE MODIFIER DAILY ACTIVITY: CH
SUGGESTED CMS G CODE MODIFIER DAILY ACTIVITY: CH
SUGGESTED CMS G CODE MODIFIER DAILY ACTIVITY: CI
MOBILITY SCORE: 24
MOBILITY SCORE: 21
DAILY ACTIVITIY SCORE: 23
SUGGESTED CMS G CODE MODIFIER MOBILITY: CJ
MOVING TO AND FROM BED TO CHAIR: A LITTLE
SUGGESTED CMS G CODE MODIFIER DAILY ACTIVITY: CH
CLIMB 3 TO 5 STEPS WITH RAILING: A LITTLE
CLIMB 3 TO 5 STEPS WITH RAILING: A LITTLE
MOVING TO AND FROM BED TO CHAIR: A LITTLE
SUGGESTED CMS G CODE MODIFIER MOBILITY: CH
SUGGESTED CMS G CODE MODIFIER MOBILITY: CJ
DAILY ACTIVITIY SCORE: 24
MOBILITY SCORE: 22
WALKING IN HOSPITAL ROOM: A LITTLE
DAILY ACTIVITIY SCORE: 24
DAILY ACTIVITIY SCORE: 24
DRESSING REGULAR LOWER BODY CLOTHING: A LITTLE

## 2022-01-01 ASSESSMENT — ENCOUNTER SYMPTOMS
DIARRHEA: 0
DIARRHEA: 0
SHORTNESS OF BREATH: 1
HEMOPTYSIS: 0
HEADACHES: 0
SORE THROAT: 0
NEUROLOGICAL NEGATIVE: 1
FALLS: 0
CHILLS: 0
NAUSEA: 0
WHEEZING: 0
LOSS OF CONSCIOUSNESS: 0
STRIDOR: 0
HEMOPTYSIS: 0
PALPITATIONS: 1
ORTHOPNEA: 0
HEMOPTYSIS: 0
HEADACHES: 0
SHORTNESS OF BREATH: 1
NERVOUS/ANXIOUS: 0
HALLUCINATIONS: 0
FALLS: 0
PALPITATIONS: 0
LOSS OF CONSCIOUSNESS: 0
SHORTNESS OF BREATH: 1
SHORTNESS OF BREATH: 1
STRIDOR: 0
VOMITING: 0
BACK PAIN: 0
SPUTUM PRODUCTION: 0
NAUSEA: 0
FEVER: 0
FEVER: 0
WEIGHT LOSS: 0
SINUS PAIN: 0
DIZZINESS: 0
DEPRESSION: 0
HEADACHES: 0
NERVOUS/ANXIOUS: 0
COUGH: 1
FOCAL WEAKNESS: 0
FOCAL WEAKNESS: 0
SHORTNESS OF BREATH: 1
PALPITATIONS: 1
DOUBLE VISION: 0
HEMOPTYSIS: 0
HEMOPTYSIS: 0
NECK PAIN: 0
SPEECH CHANGE: 0
BLOOD IN STOOL: 0
ABDOMINAL PAIN: 0
DIARRHEA: 0
SENSORY CHANGE: 0
DOUBLE VISION: 0
PALPITATIONS: 0
FLANK PAIN: 0
MYALGIAS: 0
SPEECH CHANGE: 0
COUGH: 1
VOMITING: 0
PALPITATIONS: 0
BLURRED VISION: 0
DIARRHEA: 0
CHILLS: 0
ABDOMINAL PAIN: 0
HEADACHES: 0
COUGH: 1
WEIGHT LOSS: 0
FEVER: 0
ABDOMINAL PAIN: 1
BLURRED VISION: 0
SPEECH CHANGE: 0
SPEECH CHANGE: 0
ABDOMINAL PAIN: 0
NAUSEA: 0
PALPITATIONS: 0
WHEEZING: 0
COUGH: 0
HEMOPTYSIS: 0
MUSCULOSKELETAL NEGATIVE: 1
HEMOPTYSIS: 0
NAUSEA: 0
COUGH: 0
BACK PAIN: 0
FEVER: 0
SHORTNESS OF BREATH: 0
VOMITING: 0
SINUS PAIN: 0
SENSORY CHANGE: 0
CLAUDICATION: 0
ABDOMINAL PAIN: 0
COUGH: 1
ABDOMINAL PAIN: 0
ORTHOPNEA: 0
EYE DISCHARGE: 0
NAUSEA: 0
ABDOMINAL PAIN: 0
VOMITING: 0
PALPITATIONS: 0
SHORTNESS OF BREATH: 1
CHILLS: 0
VOMITING: 0
SHORTNESS OF BREATH: 0
PALPITATIONS: 1
NERVOUS/ANXIOUS: 0
EYE DISCHARGE: 0
COUGH: 0
SHORTNESS OF BREATH: 1
DIARRHEA: 0
MUSCULOSKELETAL NEGATIVE: 1
NERVOUS/ANXIOUS: 0
COUGH: 1
HEADACHES: 0
MUSCULOSKELETAL NEGATIVE: 1
EYE PAIN: 0
SEIZURES: 0
SEIZURES: 0
ABDOMINAL PAIN: 0
NEUROLOGICAL NEGATIVE: 1
FEVER: 0
EYE DISCHARGE: 0
CHILLS: 0
ABDOMINAL PAIN: 0
SPUTUM PRODUCTION: 1
EYES NEGATIVE: 1
BACK PAIN: 0
GASTROINTESTINAL NEGATIVE: 1
SHORTNESS OF BREATH: 1
SHORTNESS OF BREATH: 1
LOSS OF CONSCIOUSNESS: 0
BACK PAIN: 0
DOUBLE VISION: 0
MUSCULOSKELETAL NEGATIVE: 1
HEMOPTYSIS: 0
FEVER: 0
WEIGHT LOSS: 0
ABDOMINAL PAIN: 0
FALLS: 0
DIARRHEA: 0
CLAUDICATION: 0
FEVER: 0
NAUSEA: 0
ABDOMINAL PAIN: 0
NAUSEA: 0
STRIDOR: 0
SORE THROAT: 0
STRIDOR: 0
CLAUDICATION: 0
GASTROINTESTINAL NEGATIVE: 1
BACK PAIN: 0
SPUTUM PRODUCTION: 0
SENSORY CHANGE: 0
GASTROINTESTINAL NEGATIVE: 1
MYALGIAS: 0
PALPITATIONS: 1
FEVER: 0
NECK PAIN: 0
WHEEZING: 0
MUSCULOSKELETAL NEGATIVE: 1
COUGH: 0
COUGH: 1
ABDOMINAL PAIN: 0
SHORTNESS OF BREATH: 0
BACK PAIN: 0
ABDOMINAL PAIN: 1
FEVER: 0
MUSCULOSKELETAL NEGATIVE: 1
PALPITATIONS: 0
NERVOUS/ANXIOUS: 0
NERVOUS/ANXIOUS: 0
SHORTNESS OF BREATH: 1
DOUBLE VISION: 0
SEIZURES: 0
CHILLS: 0
DEPRESSION: 0
NERVOUS/ANXIOUS: 0
ABDOMINAL PAIN: 0
NERVOUS/ANXIOUS: 0
NAUSEA: 0
HEMOPTYSIS: 0
SORE THROAT: 0
SORE THROAT: 0
FEVER: 0
VOMITING: 1
PHOTOPHOBIA: 0
BACK PAIN: 0
EYE DISCHARGE: 0
PSYCHIATRIC NEGATIVE: 1
WEIGHT LOSS: 0
BACK PAIN: 0
PALPITATIONS: 0
WHEEZING: 0
CONSTIPATION: 0
MUSCULOSKELETAL NEGATIVE: 1
PALPITATIONS: 0
STRIDOR: 0
NAUSEA: 0
SHORTNESS OF BREATH: 1
NAUSEA: 1
GASTROINTESTINAL NEGATIVE: 1
BRUISES/BLEEDS EASILY: 0
WHEEZING: 1
CONSTIPATION: 1
COUGH: 1
NAUSEA: 0
SPUTUM PRODUCTION: 0
PSYCHIATRIC NEGATIVE: 1
SPUTUM PRODUCTION: 0
DIARRHEA: 0
NAUSEA: 0
DIZZINESS: 0
HEMOPTYSIS: 0
PALPITATIONS: 0
GASTROINTESTINAL NEGATIVE: 1
SPEECH CHANGE: 0
COUGH: 1
BLURRED VISION: 0
DIARRHEA: 0
COUGH: 1
SHORTNESS OF BREATH: 1
BRUISES/BLEEDS EASILY: 0
CHILLS: 0
BRUISES/BLEEDS EASILY: 0
BRUISES/BLEEDS EASILY: 0
EYE DISCHARGE: 0
COUGH: 1
SPUTUM PRODUCTION: 0
FEVER: 0
VOMITING: 0
CHILLS: 0
HEADACHES: 0
DEPRESSION: 0
PSYCHIATRIC NEGATIVE: 1
PALPITATIONS: 1
FLANK PAIN: 0
BLURRED VISION: 0
NEUROLOGICAL NEGATIVE: 1
BACK PAIN: 0
PALPITATIONS: 0
BRUISES/BLEEDS EASILY: 0
ORTHOPNEA: 0
NERVOUS/ANXIOUS: 0
NERVOUS/ANXIOUS: 0
SENSORY CHANGE: 0
HALLUCINATIONS: 0
FEVER: 0
HALLUCINATIONS: 0
BLOOD IN STOOL: 0
WEAKNESS: 0
CLAUDICATION: 0
VOMITING: 0
CHILLS: 1
HEADACHES: 0
HEADACHES: 0
DIARRHEA: 0
CONSTIPATION: 0
DIZZINESS: 0
CHILLS: 0
SHORTNESS OF BREATH: 0
NAUSEA: 0
SHORTNESS OF BREATH: 0
SHORTNESS OF BREATH: 1
BLURRED VISION: 0
COUGH: 1
CHILLS: 0
VOMITING: 0
CHILLS: 0
FALLS: 0
DIARRHEA: 0
PALPITATIONS: 0
FEVER: 0
SORE THROAT: 0
SPEECH CHANGE: 0
NERVOUS/ANXIOUS: 0
CHILLS: 0
BACK PAIN: 0
FEVER: 0
ORTHOPNEA: 0
CHILLS: 0
HEMOPTYSIS: 0
CHILLS: 0
DIARRHEA: 0

## 2022-01-01 ASSESSMENT — LIFESTYLE VARIABLES
ANXIETY: NO ANXIETY (AT EASE)
AVERAGE NUMBER OF DAYS PER WEEK YOU HAVE A DRINK CONTAINING ALCOHOL: 5
HOW MANY TIMES IN THE PAST YEAR HAVE YOU HAD 5 OR MORE DRINKS IN A DAY: 0
HAVE YOU EVER FELT YOU SHOULD CUT DOWN ON YOUR DRINKING: NO
TREMOR: NO TREMOR
ANXIETY: NO ANXIETY (AT EASE)
AUDITORY DISTURBANCES: NOT PRESENT
TOTAL SCORE: 0
ALCOHOL_USE: NO
EVER HAD A DRINK FIRST THING IN THE MORNING TO STEADY YOUR NERVES TO GET RID OF A HANGOVER: NO
PAROXYSMAL SWEATS: NO SWEAT VISIBLE
AVERAGE NUMBER OF DAYS PER WEEK YOU HAVE A DRINK CONTAINING ALCOHOL: 5
NAUSEA AND VOMITING: NO NAUSEA AND NO VOMITING
DOES PATIENT WANT TO STOP DRINKING: NO
HAVE YOU EVER FELT YOU SHOULD CUT DOWN ON YOUR DRINKING: NO
ORIENTATION AND CLOUDING OF SENSORIUM: ORIENTED AND CAN DO SERIAL ADDITIONS
CONSUMPTION TOTAL: POSITIVE
TOTAL SCORE: 0
NAUSEA AND VOMITING: NO NAUSEA AND NO VOMITING
TOTAL SCORE: 0
AGITATION: NORMAL ACTIVITY
HEADACHE, FULLNESS IN HEAD: NOT PRESENT
TOTAL SCORE: 0
VISUAL DISTURBANCES: NOT PRESENT
PAROXYSMAL SWEATS: NO SWEAT VISIBLE
ON A TYPICAL DAY WHEN YOU DRINK ALCOHOL HOW MANY DRINKS DO YOU HAVE: 6
AUDITORY DISTURBANCES: NOT PRESENT
DOES PATIENT WANT TO STOP DRINKING: NO
EVER HAD A DRINK FIRST THING IN THE MORNING TO STEADY YOUR NERVES TO GET RID OF A HANGOVER: NO
HAVE YOU EVER FELT YOU SHOULD CUT DOWN ON YOUR DRINKING: NO
AUDITORY DISTURBANCES: NOT PRESENT
ORIENTATION AND CLOUDING OF SENSORIUM: ORIENTED AND CAN DO SERIAL ADDITIONS
TOTAL SCORE: 0
TOTAL SCORE: 0
CONSUMPTION TOTAL: NEGATIVE
ANXIETY: NO ANXIETY (AT EASE)
EVER FELT BAD OR GUILTY ABOUT YOUR DRINKING: NO
VISUAL DISTURBANCES: NOT PRESENT
AGITATION: NORMAL ACTIVITY
HEADACHE, FULLNESS IN HEAD: NOT PRESENT
TOTAL SCORE: 0
ALCOHOL_USE: YES
HAVE PEOPLE ANNOYED YOU BY CRITICIZING YOUR DRINKING: NO
SUBSTANCE_ABUSE: 1
HOW MANY TIMES IN THE PAST YEAR HAVE YOU HAD 5 OR MORE DRINKS IN A DAY: 360
PAROXYSMAL SWEATS: NO SWEAT VISIBLE
HOW MANY TIMES IN THE PAST YEAR HAVE YOU HAD 5 OR MORE DRINKS IN A DAY: 360
AUDITORY DISTURBANCES: NOT PRESENT
HAVE PEOPLE ANNOYED YOU BY CRITICIZING YOUR DRINKING: NO
PACK_YEARS: 18
TOTAL SCORE: 0
NAUSEA AND VOMITING: NO NAUSEA AND NO VOMITING
EVER FELT BAD OR GUILTY ABOUT YOUR DRINKING: NO
AVERAGE NUMBER OF DAYS PER WEEK YOU HAVE A DRINK CONTAINING ALCOHOL: 0
HEADACHE, FULLNESS IN HEAD: NOT PRESENT
ON A TYPICAL DAY WHEN YOU DRINK ALCOHOL HOW MANY DRINKS DO YOU HAVE: 6
NAUSEA AND VOMITING: NO NAUSEA AND NO VOMITING
CONSUMPTION TOTAL: POSITIVE
ON A TYPICAL DAY WHEN YOU DRINK ALCOHOL HOW MANY DRINKS DO YOU HAVE: 0
TOTAL SCORE: 0
ORIENTATION AND CLOUDING OF SENSORIUM: ORIENTED AND CAN DO SERIAL ADDITIONS
TREMOR: NO TREMOR
EVER HAD A DRINK FIRST THING IN THE MORNING TO STEADY YOUR NERVES TO GET RID OF A HANGOVER: NO
HAVE PEOPLE ANNOYED YOU BY CRITICIZING YOUR DRINKING: NO
VISUAL DISTURBANCES: NOT PRESENT
TREMOR: NO TREMOR
EVER_SMOKED: YES
TREMOR: NO TREMOR
TOTAL SCORE: 0
TOTAL SCORE: 0
AGITATION: NORMAL ACTIVITY
PACK_YEARS: 20
VISUAL DISTURBANCES: NOT PRESENT
ALCOHOL_USE: YES
HEADACHE, FULLNESS IN HEAD: NOT PRESENT
ORIENTATION AND CLOUDING OF SENSORIUM: ORIENTED AND CAN DO SERIAL ADDITIONS
TOTAL SCORE: 0
ANXIETY: NO ANXIETY (AT EASE)
EVER FELT BAD OR GUILTY ABOUT YOUR DRINKING: NO
TOTAL SCORE: 0
PAROXYSMAL SWEATS: NO SWEAT VISIBLE
AGITATION: NORMAL ACTIVITY

## 2022-01-01 ASSESSMENT — CHA2DS2 SCORE
DIABETES: NO
SEX: FEMALE
HYPERTENSION: NO
VASCULAR DISEASE: NO
PRIOR STROKE OR TIA OR THROMBOEMBOLISM: NO
AGE 65 TO 74: NO
CHA2DS2 VASC SCORE: 1
CHF OR LEFT VENTRICULAR DYSFUNCTION: NO
AGE 75 OR GREATER: NO

## 2022-01-01 ASSESSMENT — PAIN DESCRIPTION - PAIN TYPE
TYPE: ACUTE PAIN

## 2022-01-01 ASSESSMENT — FIBROSIS 4 INDEX
FIB4 SCORE: 2.9
FIB4 SCORE: 1.33
FIB4 SCORE: 1.61
FIB4 SCORE: 1.63
FIB4 SCORE: 3.03
FIB4 SCORE: 0.65
FIB4 SCORE: 0.63
FIB4 SCORE: 1.51
FIB4 SCORE: 1.07
FIB4 SCORE: 3.03
FIB4 SCORE: 0.91
FIB4 SCORE: 1.07
FIB4 SCORE: 1.54
FIB4 SCORE: 1.19

## 2022-01-01 ASSESSMENT — PATIENT HEALTH QUESTIONNAIRE - PHQ9
SUM OF ALL RESPONSES TO PHQ9 QUESTIONS 1 AND 2: 0
1. LITTLE INTEREST OR PLEASURE IN DOING THINGS: NOT AT ALL
1. LITTLE INTEREST OR PLEASURE IN DOING THINGS: NOT AT ALL
2. FEELING DOWN, DEPRESSED, IRRITABLE, OR HOPELESS: NOT AT ALL
1. LITTLE INTEREST OR PLEASURE IN DOING THINGS: NOT AT ALL
SUM OF ALL RESPONSES TO PHQ9 QUESTIONS 1 AND 2: 0
SUM OF ALL RESPONSES TO PHQ9 QUESTIONS 1 AND 2: 0

## 2022-01-01 ASSESSMENT — COPD QUESTIONNAIRES
DO YOU EVER COUGH UP ANY MUCUS OR PHLEGM?: NO/ONLY WITH OCCASIONAL COLDS OR INFECTIONS
HAVE YOU SMOKED AT LEAST 100 CIGARETTES IN YOUR ENTIRE LIFE: YES
HAVE YOU SMOKED AT LEAST 100 CIGARETTES IN YOUR ENTIRE LIFE: YES
DURING THE PAST 4 WEEKS HOW MUCH DID YOU FEEL SHORT OF BREATH: MOST  OR ALL OF THE TIME
COPD SCREENING SCORE: 4
COPD SCREENING SCORE: 10
DURING THE PAST 4 WEEKS HOW MUCH DID YOU FEEL SHORT OF BREATH: NONE/LITTLE OF THE TIME
DO YOU EVER COUGH UP ANY MUCUS OR PHLEGM?: YES, EVERY DAY

## 2022-01-01 ASSESSMENT — PAIN SCALES - GENERAL: PAIN_LEVEL: 0

## 2022-08-06 PROBLEM — R91.8 LUNG MASS: Status: ACTIVE | Noted: 2022-01-01

## 2022-08-06 PROBLEM — J96.01 ACUTE RESPIRATORY FAILURE WITH HYPOXIA (HCC): Status: ACTIVE | Noted: 2022-01-01

## 2022-08-06 NOTE — CONSULTS
RENFREDDIE INTENSIVIST DIRECT ADMISSION REPORT    Transferring facility: [facility]   Transferring physician: Dr Riky Obando  Transferring facility/physician contact number: as above  Chief complaint: Acute respiratory failure with hypoxia; L lung collapse 2/2 endobronchial mass  Pertinent history & patient course: Heavy smoker (2 ppd) presented with chief complaint of abdominal pain. History of alcoholism, COPD, chronic pain. Found to be in hypoxic resp failure requiring 3 lpm. Also with new Afib RVR that is now controlled on cardizem. Imaging reveals likely lung cancer.   Pertinent imaging & lab results: CT chest c/w endobronchial lesion and left lung atelectasis   Further work up or recommendations prior to transfer: None  Consultants called prior to transfer and pertinent input from consultants: Dr. Marcelo Guillermo (pulm). Plan for Monday or Tuesday bronchoscopy with tissue sampling, possible tumor debulking, and possible airway stent.   Patient accepted for transfer: Yes  Consultants to be called upon arrival: Pulmonary (Dr. Farhat Boucher)  Floor requested: Med-tele  ADT order placed for accepted patient: yes     Consult time: 30 minutes including personally reviewing CT scan and coordinating care with RTOC and Suman Steven.     Please inform the Intensivist upon assignment of an ICU bed and then again on arrival of the patient.    I spoke to Jose F in surgical scheduling at 14:11 today. We are able to add her to the endoscopy suite after the 1400 case with Dr. Guillermo that same day.

## 2022-08-06 NOTE — PROGRESS NOTES
62-year-old female with perihilar mass, consulted with Dr. Davis who recommended to admit the patient to [facility] for procedure on Monday with Dr. Marcelo Guillermo.

## 2022-08-08 PROBLEM — R79.89 ELEVATED BRAIN NATRIURETIC PEPTIDE (BNP) LEVEL: Status: ACTIVE | Noted: 2022-01-01

## 2022-08-08 PROBLEM — J44.1 COPD WITH EXACERBATION (HCC): Status: ACTIVE | Noted: 2022-01-01

## 2022-08-08 PROBLEM — I48.91 A-FIB (HCC): Status: ACTIVE | Noted: 2022-01-01

## 2022-08-08 PROBLEM — J44.9 COPD (CHRONIC OBSTRUCTIVE PULMONARY DISEASE) (HCC): Status: ACTIVE | Noted: 2022-01-01

## 2022-08-08 PROBLEM — F10.20 ALCOHOL DEPENDENCE (HCC): Status: ACTIVE | Noted: 2022-01-01

## 2022-08-09 PROBLEM — Z72.0 TOBACCO ABUSE: Status: ACTIVE | Noted: 2022-01-01

## 2022-08-09 NOTE — ASSESSMENT & PLAN NOTE
Tobacco cessation counseling and education provided for 4 minutes. Nicotine replacement options provided including patch, and further medical treatments including Wellbutrin and Chantix. As well as over the counter options of lozenges and gum.

## 2022-08-09 NOTE — HOSPITAL COURSE
Geena Richey is a 62 y.o. female who presented 8/8/2022 with abd pain. This is a female with a history of alcohol abuse (6-12 drinks daily), h/o endometrial cancer, COPD, chronic pain, depression, and overactive bladder who presented to [facility] with complaints of abdominal pain with nausea and emesis. On presentation there, she was found to be in new onset afib and was started on a dilt drip. CTA was obtained and demonstrated a left hilar mass with compression of the bronchus in the left upper lobe with partial obstruction, with concurrent pleural effusion, but no PTE.  She required 3L in at [facility]. For her afib, she was transitioned off of dilt drip onto oral dilt. Upon presentation to [facility] pulmonology was consulted. [facility] protocol in setting of alcohol abuse. Started on Rocephin for CAP coverage. 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 Patient will be transferred to [facility] for evaluaiton by oncology and rad onc for debulking therapy. MRI brain pending at time of transfer. Patient was determined satisfactory for discharge with appropriate follow up.

## 2022-08-09 NOTE — ASSESSMENT & PLAN NOTE
- New onset afib at outside facility  - Now on oral Diltiazem  - Echo with preserved EF  - TSH   CHADSVASC 1 - no indication for antcoagulation  Concern for bleeding of endobronchial lesion as well on anticoagulation

## 2022-08-09 NOTE — FLOWSHEET NOTE
08/08/22 2043   Inhalation Therapy Treatment   $ SVN Performed Yes   Given By: Mouthpiece   $ MDI/DPI Given MDI/DPI x 1   Chest Physiotherapy Treatment   $ PEP/CPT Performed PEP / Flutter

## 2022-08-09 NOTE — H&P
Hospital Medicine History & Physical Note    Date of Service  8/8/2022    Primary Care Physician  Pcp Pt States None    Consultants  pulmonary    Specialist Names: Citlaly    Code Status  Full Code    Chief Complaint  Lung mass    History of Presenting Illness  Geena Richey is a 62 y.o. female who presented 8/8/2022 with Abd pain. This is a female with a history of alcohol abuse (6-12 drinks daily), h/o endometrial cancer, COPD, chronic pain, depression, and overactive bladder who presented to [facility] with complaints of abdominal pain with nausea and emesis. On presentation there, she was found to be in new onset afib and was started on a dilt drip. CTA was obtained and demonstrated a left hilar mass with compression of the bronchus in the left upper lobe with partial obstruction, with concurrent pleural effusion, but no PTE.  She required 3L in at [facility]. For her afib, she was transitioned off of dilt drip onto oral dilt. Discussed with Dr Boucher who is planning on bronch tomorrow or Wednesday pending slot availability.      Pt not SOB, no wheezing on exam, no labs here yet. Mild elevation of HR to 111. Discussed anticoagulation for afib with Dr Boucher given plans for bronch tmrw, hold all anticoagulation for now.     I discussed the plan of care with patient and Citlaly.    Review of Systems  Review of Systems   Constitutional: Negative for chills and fever.   Respiratory: Positive for cough and shortness of breath.    Cardiovascular: Negative for chest pain and leg swelling.   Gastrointestinal: Negative for abdominal pain, diarrhea, nausea and vomiting.   All other systems reviewed and are negative.      Past Medical History   has a past medical history of Arthritis, Cancer (2014), Cholesterol blood decreased, and Other specified symptom associated with female genital organs.    Surgical History   has a past surgical history that includes gyn surgery (1980's); dilation and curettage (8/14/2014);  hysterectomy robotic xi (9/25/2014); lymph node dissection robotic xi (9/25/2014); and omentectomy (9/25/2014).     Family History  family history is not on file.   Family history reviewed with patient. There is no family history that is pertinent to the chief complaint. Mom with cancer and renal failure.    Social History   reports that she has been smoking cigarettes. She has a 17.50 pack-year smoking history. She does not have any smokeless tobacco history on file. She reports current alcohol use. She reports that she does not use drugs.    Allergies  No Known Allergies    Medications  Prior to Admission Medications   Prescriptions Last Dose Informant Patient Reported? Taking?   ibuprofen (MOTRIN) 600 MG TABS  Patient Yes No   Sig: Take 600 mg by mouth every 6 hours as needed.   metoclopramide (REGLAN) 10 MG TABS   No No   Sig: Take 1 Tab by mouth every 6 hours as needed (nausea/emesis).   oxycodone-acetaminophen (PERCOCET) 7.5-325 MG per tablet   No No   Sig: Take 1-2 Tabs by mouth every 6 hours as needed (pain).   trazodone (DESYREL) 50 MG TABS   Yes No   Sig: Take 50 mg by mouth every evening.      Facility-Administered Medications: None       Physical Exam  Temp:  [36.9 °C (98.4 °F)] 36.9 °C (98.4 °F)  Pulse:  [111] 111  Resp:  [18] 18  BP: (125)/(83) 125/83  SpO2:  [96 %] 96 %  Blood Pressure: 125/83   Temperature: 36.9 °C (98.4 °F)   Pulse: (!) 111   Respiration: 18   Pulse Oximetry: 96 %       Physical Exam  Vitals and nursing note reviewed.   Constitutional:       Appearance: Normal appearance.   HENT:      Head: Normocephalic and atraumatic.      Mouth/Throat:      Mouth: Mucous membranes are moist.   Cardiovascular:      Rate and Rhythm: Tachycardia present. Rhythm irregular.      Heart sounds: No murmur heard.  Pulmonary:      Effort: Pulmonary effort is normal. No respiratory distress.      Breath sounds: No wheezing, rhonchi or rales.   Abdominal:      General: Abdomen is flat. Bowel sounds are  normal. There is no distension.      Palpations: Abdomen is soft.      Tenderness: There is no abdominal tenderness. There is no guarding.   Musculoskeletal:         General: No swelling.      Right lower leg: No edema.      Left lower leg: No edema.   Skin:     General: Skin is warm and dry.   Neurological:      General: No focal deficit present.      Mental Status: She is alert.   Psychiatric:         Mood and Affect: Mood normal.         Behavior: Behavior normal.         Laboratory:          No results for input(s): ALTSGPT, ASTSGOT, ALKPHOSPHAT, TBILIRUBIN, DBILIRUBIN, GAMMAGT, AMYLASE, LIPASE, ALB, PREALBUMIN, GLUCOSE in the last 72 hours.      No results for input(s): NTPROBNP in the last 72 hours.      No results for input(s): TROPONINT in the last 72 hours.    Imaging:  DX-CHEST-2 VIEWS    (Results Pending)       no X-Ray or EKG requiring interpretation    Assessment/Plan:  Justification for Admission Status  I anticipate this patient will require at least two midnights for appropriate medical management, necessitating inpatient admission because pt to have bronchoscopy with biopsies    Patient will need a Telemetry bed on MEDICAL service .  The need is secondary to lung mass.    * Lung mass- (present on admission)  Assessment & Plan  - Long time smoker, hx of endometrial ca  - hilar mass with compression of the bronchus in the MIGNON with partial obstruction and concurrent pleural effusion  - Check CXR for baseline  - To have bronch tomorrow with Dr Boucher, NPO after midnight   - Hold anticoagulation today    A-fib (HCC)- (present on admission)  Assessment & Plan  - New onset afib at outside facility  - Now on oral Diltiazem,   - Echo  - TSH   - Hold anticoagulation for bronch tomorrow per Pulm     COPD with exacerbation  Assessment & Plan  - Continue home Symbicort  - PRN duonebs  - RT consult and flutter valve  - Oral prednisone  - Rocephin/Azithro  - CXR    Alcohol dependence (HCC)- (present on  admission)  Assessment & Plan  -6-12 drinks daily  - CIWA       Elevated brain natriuretic peptide (BNP) level- (present on admission)  Assessment & Plan  - Echo   - Does not appear overloaded      Acute respiratory failure with hypoxia (HCC)- (present on admission)  Assessment & Plan  - pt requiring 3L  - No active wheezing on exam but was on steroids at outside facility - continue Prednisone  - Hilar lung mass at outside facility with compression of the left upper lobe bronchus         VTE prophylaxis: SCDs/TEDs

## 2022-08-09 NOTE — CONSULTS
Pulmonary Consultation    Date of consult: 8/9/2022    Referring Physician  Jovanni Reed D.O.    Reason for Consultation  Abnormal CT chest     History of Presenting Illness  62 y.o. female who presented 8/8/2022 with abnormal CT chest.  Patient with significant history of self reported COPD, alcoholism, and depression who presents to [facility] with complaints of abdominal pain. Further workup revealed patient was in A fib w/ RVR. She was started on diltiazem infusion and systemic AC. CTA showed emphysematous changes, left hilar mass obstructing MIGNON and small left pleural effusion. Patient was transferred here to [facility] for further workup. She was scheduled for bronchoscopy/EBUS yesterday but transfer was delayed due to insurance authorization. Pulmonary consulted for further evaluation.     Subjectively patient reports feeling fine. Denies cough, fever/chills, abdominal pain, weight loss, or decrease appetite. Patient is a current smoker (2 pk/day) for over 30 years. No reported family history of malignancy or lung disease. Never had PFTs to confirm airflow obstruction.     Lung US showed small left pleural effusion, not amenable for a safe bedside thoracentesis.           Code Status  Full Code    Review of Systems  Review of Systems   Constitutional: Positive for chills. Negative for fever, malaise/fatigue and weight loss.   HENT: Negative.    Eyes: Negative.    Respiratory: Positive for cough. Negative for hemoptysis, sputum production, shortness of breath and wheezing.    Cardiovascular: Positive for palpitations. Negative for chest pain, orthopnea, claudication and leg swelling.   Gastrointestinal: Positive for abdominal pain. Negative for constipation, diarrhea, nausea and vomiting.   Genitourinary: Negative.    Musculoskeletal: Negative.    Skin: Negative.    All other systems reviewed and are negative.      Past Medical History   has a past medical history of Arthritis, Cancer (2014),  Cholesterol blood decreased, and Other specified symptom associated with female genital organs.    Surgical History   has a past surgical history that includes gyn surgery (1980's); dilation and curettage (8/14/2014); hysterectomy robotic xi (9/25/2014); lymph node dissection robotic xi (9/25/2014); and omentectomy (9/25/2014).    Family History  family history is not on file.    Social History   reports that she has been smoking cigarettes. She has a 17.50 pack-year smoking history. She does not have any smokeless tobacco history on file. She reports current alcohol use. She reports that she does not use drugs.    Medications  Home Medications     Reviewed by Veronica Acosta R.N. (Registered Nurse) on 08/09/22 at 0049  Med List Status: Not Addressed   Medication Last Dose Status   ibuprofen (MOTRIN) 600 MG TABS  Active   metoclopramide (REGLAN) 10 MG TABS  Active   oxycodone-acetaminophen (PERCOCET) 7.5-325 MG per tablet  Active   trazodone (DESYREL) 50 MG TABS  Active              Current Facility-Administered Medications   Medication Dose Route Frequency Provider Last Rate Last Admin   • Respiratory Therapy Consult   Nebulization Continuous RT Melony Rhodes M.D.       • ipratropium-albuterol (DUONEB) nebulizer solution  3 mL Nebulization Q4HRS (RT) Melony Rhodes M.D.   3 mL at 08/09/22 1100   • ipratropium-albuterol (DUONEB) nebulizer solution  3 mL Nebulization Q2HRS PRN (RT) Melony Rhodes M.D.       • senna-docusate (PERICOLACE or SENOKOT S) 8.6-50 MG per tablet 2 Tablet  2 Tablet Oral BID Melony Rhodes M.D.   2 Tablet at 08/09/22 0505    And   • polyethylene glycol/lytes (MIRALAX) PACKET 1 Packet  1 Packet Oral QDAY PRN Melony Rhodes M.D.        And   • magnesium hydroxide (MILK OF MAGNESIA) suspension 30 mL  30 mL Oral QDAY PRN Melony Rhodes M.D.        And   • bisacodyl (DULCOLAX) suppository 10 mg  10 mg Rectal QDAY PRN Melony Rhodes M.D.       • acetaminophen (Tylenol) tablet 650 mg  650 mg  Oral Q6HRS PRN Melony Rhodes M.D.       • ondansetron (ZOFRAN) syringe/vial injection 4 mg  4 mg Intravenous Q4HRS PRN Melony Rhodes M.D.       • ondansetron (ZOFRAN ODT) dispertab 4 mg  4 mg Oral Q4HRS PRN Melony Rhodes M.D.       • promethazine (PHENERGAN) tablet 12.5-25 mg  12.5-25 mg Oral Q4HRS PRN Meloyn Rhodes M.D.       • promethazine (PHENERGAN) suppository 12.5-25 mg  12.5-25 mg Rectal Q4HRS PRN Melony Rhodes M.D.       • prochlorperazine (COMPAZINE) injection 5-10 mg  5-10 mg Intravenous Q4HRS PRN Melony Rhodes M.D.       • labetalol (NORMODYNE/TRANDATE) injection 10 mg  10 mg Intravenous Q4HRS PRN Melony Rhodes M.D.       • LORazepam (ATIVAN) tablet 0.5 mg  0.5 mg Oral Q4HRS PRN Melony Rhodes M.D.       • LORazepam (ATIVAN) tablet 1 mg  1 mg Oral Q4HRS PRN Melony Rhodes M.D.       • LORazepam (ATIVAN) tablet 2 mg  2 mg Oral Q2HRS PRN Melony Rhodes M.D.       • LORazepam (ATIVAN) tablet 3 mg  3 mg Oral Q HOUR PRN Melony Rhodes M.D.       • LORazepam (ATIVAN) tablet 4 mg  4 mg Oral Q15 MIN PRN Melony Rhodes M.D.        Or   • LORazepam (ATIVAN) injection 2 mg  2 mg Intravenous Q15 MIN PRN Melony Rhodes M.D.       • thiamine (Vitamin B-1) tablet 100 mg  100 mg Oral DAILY Melony Rhodes M.D.   100 mg at 08/09/22 0505    And   • multivitamin (THERAGRAN) tablet 1 Tablet  1 Tablet Oral DAILY Melony Rhodes M.D.   1 Tablet at 08/09/22 0505    And   • folic acid (FOLVITE) tablet 1 mg  1 mg Oral DAILY Melony Rhodes M.D.   1 mg at 08/09/22 0506   • guaiFENesin dextromethorphan (ROBITUSSIN DM) 100-10 MG/5ML syrup 10 mL  10 mL Oral Q6HRS PRN Melony Rhodes M.D.       • insulin regular (HumuLIN R,NovoLIN R) injection  1-6 Units Subcutaneous Q6HRS Melony Rhodes M.D.   2 Units at 08/08/22 2315    And   • dextrose 10 % BOLUS 25 g  25 g Intravenous Q15 MIN PRN Melony Rhodes M.D.       • nicotine (NICODERM) 21 MG/24HR 21 mg  21 mg Transdermal Daily-0600 Melony Rhodes M.D.   21 mg at  08/09/22 0506    And   • nicotine polacrilex (NICORETTE) 2 MG piece 2 mg  2 mg Oral Q HOUR PRN Melony Rhodes M.D.       • predniSONE (DELTASONE) tablet 40 mg  40 mg Oral DAILY Melony Rhodes M.D.   40 mg at 08/09/22 0505   • cefTRIAXone (Rocephin) 2 g in  mL IVPB  2 g Intravenous Q EVENING Melony Rhodes M.D.   Stopped at 08/08/22 2207   • azithromycin (ZITHROMAX) tablet 500 mg  500 mg Oral Q EVENING Melony Rhodes M.D.   500 mg at 08/08/22 2137   • DILTIAZem CD (CARDIZEM CD) capsule 240 mg  240 mg Oral Q DAY Melony Rhodes M.D.   240 mg at 08/09/22 0506   • budesonide-formoterol (SYMBICORT) 80-4.5 MCG/ACT inhaler 2 Puff  2 Puff Inhalation BID (RT) Melony Rhodes M.D.   2 Puff at 08/09/22 0726   • traZODone (DESYREL) tablet 50 mg  50 mg Oral Nightly Jonnie Middleton M.D.   50 mg at 08/08/22 2137       Allergies  No Known Allergies    Vital Signs last 24 hours  Temp:  [36.2 °C (97.2 °F)-36.9 °C (98.4 °F)] 36.6 °C (97.9 °F)  Pulse:  [] 113  Resp:  [18-22] 18  BP: (102-125)/(57-83) 112/61  SpO2:  [90 %-97 %] 90 %    Physical Exam  Physical Exam  Constitutional:       Appearance: Normal appearance. She is not ill-appearing.   HENT:      Head: Normocephalic.      Nose: Nose normal.   Cardiovascular:      Rate and Rhythm: Normal rate and regular rhythm.   Pulmonary:      Comments: Decrease BS     Abdominal:      General: Abdomen is flat. Bowel sounds are normal. There is no distension.      Palpations: Abdomen is soft.   Musculoskeletal:         General: Normal range of motion.      Cervical back: Normal range of motion.      Right lower leg: No edema.      Left lower leg: No edema.   Skin:     General: Skin is warm.   Neurological:      Mental Status: She is alert and oriented to person, place, and time.      Cranial Nerves: No cranial nerve deficit.   Psychiatric:         Mood and Affect: Mood normal.         Fluids  No intake or output data in the 24 hours ending 08/09/22 1002    Laboratory  Recent  Results (from the past 48 hour(s))   Comp Metabolic Panel    Collection Time: 08/08/22  9:36 PM   Result Value Ref Range    Sodium 136 135 - 145 mmol/L    Potassium 3.9 3.6 - 5.5 mmol/L    Chloride 97 96 - 112 mmol/L    Co2 22 20 - 33 mmol/L    Anion Gap 17.0 (H) 7.0 - 16.0    Glucose 223 (H) 65 - 99 mg/dL    Bun 26 (H) 8 - 22 mg/dL    Creatinine 1.03 0.50 - 1.40 mg/dL    Calcium 8.4 8.4 - 10.2 mg/dL    AST(SGOT) 13 12 - 45 U/L    ALT(SGPT) 16 2 - 50 U/L    Alkaline Phosphatase 51 30 - 99 U/L    Total Bilirubin 0.5 0.1 - 1.5 mg/dL    Albumin 4.0 3.2 - 4.9 g/dL    Total Protein 6.8 6.0 - 8.2 g/dL    Globulin 2.8 1.9 - 3.5 g/dL    A-G Ratio 1.4 g/dL   CBC WITH DIFFERENTIAL    Collection Time: 08/08/22  9:36 PM   Result Value Ref Range    WBC 12.4 (H) 4.8 - 10.8 K/uL    RBC 4.78 4.20 - 5.40 M/uL    Hemoglobin 15.7 12.0 - 16.0 g/dL    Hematocrit 47.1 (H) 37.0 - 47.0 %    MCV 98.5 (H) 81.4 - 97.8 fL    MCH 32.8 27.0 - 33.0 pg    MCHC 33.3 (L) 33.6 - 35.0 g/dL    RDW 48.4 35.9 - 50.0 fL    Platelet Count 149 (L) 164 - 446 K/uL    MPV 12.0 9.0 - 12.9 fL    Neutrophils-Polys 89.80 (H) 44.00 - 72.00 %    Lymphocytes 4.00 (L) 22.00 - 41.00 %    Monocytes 5.10 0.00 - 13.40 %    Eosinophils 0.00 0.00 - 6.90 %    Basophils 0.10 0.00 - 1.80 %    Immature Granulocytes 1.00 (H) 0.00 - 0.90 %    Nucleated RBC 0.00 /100 WBC    Neutrophils (Absolute) 11.15 (H) 2.00 - 7.15 K/uL    Lymphs (Absolute) 0.50 (L) 1.00 - 4.80 K/uL    Monos (Absolute) 0.64 0.00 - 0.85 K/uL    Eos (Absolute) 0.00 0.00 - 0.51 K/uL    Baso (Absolute) 0.01 0.00 - 0.12 K/uL    Immature Granulocytes (abs) 0.13 (H) 0.00 - 0.11 K/uL    NRBC (Absolute) 0.00 K/uL   ESTIMATED GFR    Collection Time: 08/08/22  9:36 PM   Result Value Ref Range    GFR (CKD-EPI) 62 >60 mL/min/1.73 m 2   POCT glucose device results    Collection Time: 08/08/22  9:45 PM   Result Value Ref Range    POC Glucose, Blood 232 (H) 65 - 99 mg/dL   POCT glucose device results    Collection Time:  08/08/22 11:11 PM   Result Value Ref Range    POC Glucose, Blood 229 (H) 65 - 99 mg/dL   CBC with Differential    Collection Time: 08/09/22  2:58 AM   Result Value Ref Range    WBC 11.2 (H) 4.8 - 10.8 K/uL    RBC 4.36 4.20 - 5.40 M/uL    Hemoglobin 14.3 12.0 - 16.0 g/dL    Hematocrit 43.0 37.0 - 47.0 %    MCV 98.6 (H) 81.4 - 97.8 fL    MCH 32.8 27.0 - 33.0 pg    MCHC 33.3 (L) 33.6 - 35.0 g/dL    RDW 47.8 35.9 - 50.0 fL    Platelet Count 131 (L) 164 - 446 K/uL    MPV 12.3 9.0 - 12.9 fL    Neutrophils-Polys 87.90 (H) 44.00 - 72.00 %    Lymphocytes 3.80 (L) 22.00 - 41.00 %    Monocytes 7.20 0.00 - 13.40 %    Eosinophils 0.00 0.00 - 6.90 %    Basophils 0.10 0.00 - 1.80 %    Immature Granulocytes 1.00 (H) 0.00 - 0.90 %    Nucleated RBC 0.00 /100 WBC    Neutrophils (Absolute) 9.82 (H) 2.00 - 7.15 K/uL    Lymphs (Absolute) 0.42 (L) 1.00 - 4.80 K/uL    Monos (Absolute) 0.80 0.00 - 0.85 K/uL    Eos (Absolute) 0.00 0.00 - 0.51 K/uL    Baso (Absolute) 0.01 0.00 - 0.12 K/uL    Immature Granulocytes (abs) 0.11 0.00 - 0.11 K/uL    NRBC (Absolute) 0.00 K/uL   Comp Metabolic Panel (CMP)    Collection Time: 08/09/22  2:58 AM   Result Value Ref Range    Sodium 139 135 - 145 mmol/L    Potassium 3.5 (L) 3.6 - 5.5 mmol/L    Chloride 101 96 - 112 mmol/L    Co2 25 20 - 33 mmol/L    Anion Gap 13.0 7.0 - 16.0    Glucose 177 (H) 65 - 99 mg/dL    Bun 24 (H) 8 - 22 mg/dL    Creatinine 0.82 0.50 - 1.40 mg/dL    Calcium 8.2 (L) 8.4 - 10.2 mg/dL    AST(SGOT) 11 (L) 12 - 45 U/L    ALT(SGPT) 15 2 - 50 U/L    Alkaline Phosphatase 45 30 - 99 U/L    Total Bilirubin 0.3 0.1 - 1.5 mg/dL    Albumin 3.5 3.2 - 4.9 g/dL    Total Protein 6.0 6.0 - 8.2 g/dL    Globulin 2.5 1.9 - 3.5 g/dL    A-G Ratio 1.4 g/dL   Lipid Profile (Lipid Panel) Fasting    Collection Time: 08/09/22  2:58 AM   Result Value Ref Range    Cholesterol,Tot 143 100 - 199 mg/dL    Triglycerides 93 0 - 149 mg/dL    HDL 46 >=40 mg/dL    LDL 78 <100 mg/dL   Magnesium    Collection Time:  08/09/22  2:58 AM   Result Value Ref Range    Magnesium 2.6 (H) 1.5 - 2.5 mg/dL   Phosphorus    Collection Time: 08/09/22  2:58 AM   Result Value Ref Range    Phosphorus 2.2 (L) 2.5 - 4.5 mg/dL   Prothrombin Time    Collection Time: 08/09/22  2:58 AM   Result Value Ref Range    PT 14.3 12.0 - 14.6 sec    INR 1.16 (H) 0.87 - 1.13   ESTIMATED GFR    Collection Time: 08/09/22  2:58 AM   Result Value Ref Range    GFR (CKD-EPI) 81 >60 mL/min/1.73 m 2   POCT glucose device results    Collection Time: 08/09/22  5:16 AM   Result Value Ref Range    POC Glucose, Blood 131 (H) 65 - 99 mg/dL       Imaging  DX-CHEST-2 VIEWS   Final Result         1.  Patchy left pulmonary infiltrates.   2.  Moderate layering left pleural effusion          Assessment/Plan  * Lung mass- (present on admission)  Assessment & Plan  -- Left hilar mass vs endobronchial lesion concerning for malignancy until proven otherwise   -- Pleural effusion is too small for a safe bedside thoracentesis   -- Scheduled for bronchoscopy/EBUS w/ biopsy tomorrow at 1 pm    -- Please keep patient NPO and hold full dose AC   -- Further recommendation pending biopsy results       COPD with exacerbation  Assessment & Plan  -- Needs outpatient PFTs to confirm airflow obstruction   -- Smoking cessation advised   -- Cont prednisone 40 mg X 5 days   -- On symbicort       Acute respiratory failure with hypoxia (HCC)- (present on admission)  Assessment & Plan  -- Secondary to lung mass w/ collapse and ? Post obstructive PNA   -- On CAP coverage   -- Pending biopsy as below to rule out bronchogenic CA   -- HOB elevation   -- Aspiration precaution   -- Goal SpO2 > 88%         Discussed patient condition and risk of morbidity and/or mortality with Hospitalist, Family, RN, Pharmacy and Patient.

## 2022-08-09 NOTE — ASSESSMENT & PLAN NOTE
- pt requiring 3L  - No active wheezing on exam but was on steroids at outside facility - continue Prednisone  - Hilar lung mass at outside facility with compression of the left upper lobe bronchus

## 2022-08-09 NOTE — PROGRESS NOTES
Telemetry Shift Summary     Rhythm: Afib  HR:   Ectopy: o-fPVC, r-o Triplets, oBigem, o-f Couplets  Measurements: -/08/-   (Per 0400 strip)  Normal Values  Rhythm: SR  HR:   Measurements: 0.12-0.20 / 0.04-0.10 / 0.30-0.52    Strip reviewed and placed in chart

## 2022-08-09 NOTE — CARE PLAN
The patient is Stable - Low risk of patient condition declining or worsening    Shift Goals  Clinical Goals: bronchoscopy. thorocentesis, culture fluid.  Patient Goals: take care of cats and visit boyfriend.    Progress made toward(s) clinical / shift goals:    Problem: Knowledge Deficit - Standard  Goal: Patient and family/care givers will demonstrate understanding of plan of care, disease process/condition, diagnostic tests and medications  Outcome: Progressing  Note: Pt and family educated on lung disease process and procedures - thorocentesis and bronchoscopy. Pt and boyfriend verbalize understanding. All questions answered.      Problem: Nutrition - Advanced  Goal: Patient will display progressive weight gain toward goal have adequate food and fluid intake  Note: Pt was progressed from NPO to a regular diet due to bronchoscopy happening tomorrow rather than today.        Patient is not progressing towards the following goals:

## 2022-08-09 NOTE — CARE PLAN
The patient is Stable - Low risk of patient condition declining or worsening         Progress made toward(s) clinical / shift goals:    Problem: Knowledge Deficit - Standard  Goal: Patient and family/care givers will demonstrate understanding of plan of care, disease process/condition, diagnostic tests and medications  Outcome: Progressing  Note: Updated patient on plan of care including medications and treatments. Encouraged verbalization of questions and concerns. All concerns have been addressed at this time.      Problem: Optimal Care for Alcohol Withdrawal  Goal: Optimal Care for the alcohol withdrawal patient  Outcome: Progressing  Note: Patient has shown no symptoms of active ETOH withdrawal during my shift.        Patient is not progressing towards the following goals:

## 2022-08-09 NOTE — PROGRESS NOTES
[facility] Medicine Daily Progress Note    Date of Service  8/9/2022    Chief Complaint  Geena Richey is a 62 y.o. female admitted 8/8/2022 with abdominal pain.     Hospital Course  Geena Richey is a 62 y.o. female who presented 8/8/2022 with Abd pain. This is a female with a history of alcohol abuse (6-12 drinks daily), h/o endometrial cancer, COPD, chronic pain, depression, and overactive bladder who presented to [facility] with complaints of abdominal pain with nausea and emesis. On presentation there, she was found to be in new onset afib and was started on a dilt drip. CTA was obtained and demonstrated a left hilar mass with compression of the bronchus in the left upper lobe with partial obstruction, with concurrent pleural effusion, but no PTE.  She required 3L in at [facility]. For her afib, she was transitioned off of dilt drip onto oral dilt.       Interval Problem Update  Patient was seen and examined at bedside.  No acute events overnight. Patient is resting comfortably in bed and in no acute distress.     Pulm recs  Possible bronch this week  3L supplemental oxygen  Rocephin, Azithromycin  CIWA    I have discussed this patient's plan of care and discharge plan at IDT rounds today with Case Management, Nursing, Nursing leadership, and other members of the IDT team.    Consultants/Specialty  pulmonary    Code Status  Full Code    Disposition  Patient is not medically cleared for discharge.   Anticipate discharge to to home with close outpatient follow-up.  I have placed the appropriate orders for post-discharge needs.    Review of Systems  Review of Systems   Constitutional: Negative for chills and fever.   HENT: Negative for congestion and sore throat.    Eyes: Negative for blurred vision and double vision.   Respiratory: Positive for cough and shortness of breath.    Cardiovascular: Negative for chest pain and palpitations.   Gastrointestinal: Negative for abdominal pain,  diarrhea, nausea and vomiting.   Genitourinary: Negative for dysuria and frequency.   Musculoskeletal: Negative for back pain and falls.   Skin: Negative for rash.   Neurological: Negative for seizures and loss of consciousness.   Endo/Heme/Allergies: Does not bruise/bleed easily.   Psychiatric/Behavioral: Negative for hallucinations. The patient is not nervous/anxious.         Physical Exam  Temp:  [36.2 °C (97.2 °F)-36.9 °C (98.4 °F)] 36.7 °C (98.1 °F)  Pulse:  [] 98  Resp:  [18-22] 18  BP: (102-125)/(57-83) 122/72  SpO2:  [90 %-97 %] 90 %    Physical Exam  Vitals and nursing note reviewed.   Constitutional:       General: She is not in acute distress.     Appearance: She is not toxic-appearing.      Comments: Chronically ill appearing   HENT:      Head: Normocephalic.      Right Ear: External ear normal.      Left Ear: External ear normal.      Nose: No congestion.      Mouth/Throat:      Mouth: Mucous membranes are moist.      Pharynx: No oropharyngeal exudate.   Eyes:      General: No scleral icterus.     Pupils: Pupils are equal, round, and reactive to light.   Cardiovascular:      Rate and Rhythm: Tachycardia present.      Heart sounds: No murmur heard.  Pulmonary:      Breath sounds: No wheezing.   Abdominal:      Palpations: Abdomen is soft.      Tenderness: There is no abdominal tenderness. There is no guarding or rebound.   Musculoskeletal:         General: No swelling or deformity.   Skin:     Coloration: Skin is not jaundiced.      Findings: No bruising.   Neurological:      General: No focal deficit present.      Mental Status: She is alert and oriented to person, place, and time. Mental status is at baseline.      Motor: No weakness.   Psychiatric:         Mood and Affect: Mood normal.         Behavior: Behavior normal.         Fluids  No intake or output data in the 24 hours ending 08/09/22 1228    Laboratory  Recent Labs     08/08/22  2136 08/09/22  0258   WBC 12.4* 11.2*   RBC 4.78 4.36    HEMOGLOBIN 15.7 14.3   HEMATOCRIT 47.1* 43.0   MCV 98.5* 98.6*   MCH 32.8 32.8   MCHC 33.3* 33.3*   RDW 48.4 47.8   PLATELETCT 149* 131*   MPV 12.0 12.3     Recent Labs     08/08/22  2136 08/09/22  0258   SODIUM 136 139   POTASSIUM 3.9 3.5*   CHLORIDE 97 101   CO2 22 25   GLUCOSE 223* 177*   BUN 26* 24*   CREATININE 1.03 0.82   CALCIUM 8.4 8.2*     Recent Labs     08/09/22  0258   INR 1.16*         Recent Labs     08/09/22  0258   TRIGLYCERIDE 93   HDL 46   LDL 78       Imaging  DX-CHEST-2 VIEWS   Final Result         1.  Patchy left pulmonary infiltrates.   2.  Moderate layering left pleural effusion           Assessment/Plan  * Lung mass- (present on admission)  Assessment & Plan  - Long time smoker, hx of endometrial ca  - hilar mass with compression of the bronchus in the MIGNON with partial obstruction and concurrent pleural effusion  - Check CXR for baseline  - To have bronch tomorrow with Dr Boucher, NPO after midnight   - Hold anticoagulation today    Acute respiratory failure with hypoxia (HCC)- (present on admission)  Assessment & Plan  - pt requiring 3L  - No active wheezing on exam but was on steroids at outside facility - continue Prednisone  - Hilar lung mass at outside facility with compression of the left upper lobe bronchus       A-fib (HCC)- (present on admission)  Assessment & Plan  - New onset afib at outside facility  - Now on oral Diltiazem,   - Echo  - TSH   - Hold anticoagulation in setting of possible bronch    Tobacco abuse  Assessment & Plan  Tobacco cessation counseling and education provided for 4 minutes. Nicotine replacement options provided including patch, and further medical treatments including Wellbutrin and Chantix. As well as over the counter options of lozenges and gum.      COPD with exacerbation  Assessment & Plan  - Continue home Symbicort  - PRN duonebs  - RT consult and flutter valve  - Oral prednisone  - Rocephin/Azithro  - CXR    Alcohol dependence (HCC)- (present on  admission)  Assessment & Plan  -6-12 drinks daily  - CIWA       Elevated brain natriuretic peptide (BNP) level- (present on admission)  Assessment & Plan  - Echo   - Does not appear overloaded         VTE prophylaxis: SCDs/TEDs    I have performed a physical exam and reviewed and updated ROS and Plan today (8/9/2022). In review of yesterday's note (8/8/2022), there are no changes except as documented above.

## 2022-08-09 NOTE — ASSESSMENT & PLAN NOTE
- Long time smoker, hx of endometrial ca  - hilar mass with compression of the bronchus in the MIGNON with partial obstruction and concurrent pleural effusion  S/p bronch 08/10  Await final path report  MRI brain completed

## 2022-08-09 NOTE — ASSESSMENT & PLAN NOTE
- Continue home Symbicort  - PRN rob  - RT consult and flutter valve  - Oral prednisone  - Rocephin/Azithro  - CXR

## 2022-08-09 NOTE — ASSESSMENT & PLAN NOTE
-- 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 Espinoza and recommended transfer to [facility] for potential inpatient radiation vs chemotherapy   -- Path positive for squamous cell carcinoma pending final LN path   -- Given there is complete occlusion of left mainstem and unable to pass bronchoscope, patient would be at high risk for airway perforation from tumor debulking therapy and would not be a candidate at this time. Case d/w Dr. Guillermo whom is also in agreement with it.

## 2022-08-09 NOTE — ASSESSMENT & PLAN NOTE
-- Needs outpatient PFTs to confirm airflow obstruction   -- Smoking cessation advised   -- Cont prednisone 40 mg X 5 days   -- On symbicort

## 2022-08-09 NOTE — PROGRESS NOTES
4 Eyes Skin Assessment Completed by HATTIE Martin and HATTIE Montero.    Head WDL  Ears WDL  Nose WDL  Mouth WDL  Neck WDL  Breast/Chest WDL  Shoulder Blades WDL  Spine WDL  (R) Arm/Elbow/Hand WDL  (L) Arm/Elbow/Hand WDL  Abdomen WDL  Groin WDL  Scrotum/Coccyx/Buttocks WDL  (R) Leg Redness, irritation from sticker on lower leg  (L) Leg Redness, irritation from sticker on lower leg  (R) Heel/Foot/Toe WDL, red birthmark on medial aspect of food  (L) Heel/Foot/Toe WDL          Devices In Places Tele Box      Interventions In Place Pressure Redistribution Mattress    Possible Skin Injury No    Pictures Uploaded Into Epic N/A  Wound Consult Placed N/A  RN Wound Prevention Protocol Ordered No \

## 2022-08-09 NOTE — ASSESSMENT & PLAN NOTE
-- Secondary to lung mass w/ collapse and post obstructive PNA   -- On CAP coverage   -- Wash cx negative to date   -- Cont CAP coverage X 7 days   -- Biopsy came back positive for squamous cell CA pending final LN path    -- HOB elevation   -- Aspiration precaution   -- Goal SpO2 > 88%   -- Check 6 minute walk test to assess for home O2 therapy

## 2022-08-10 NOTE — DISCHARGE PLANNING
Case Management Discharge Planning    Admission Date: 8/8/2022  GMLOS: 4.9  ALOS: 2    6-Clicks ADL Score: 24  6-Clicks Mobility Score: 24      Anticipated Discharge Dispo:  Home with DME oxygen    DME Needed: Yes-oxygen    DME Ordered: No    Action(s) Taken: Discussed pt in IDT rounds. Per MD, anticipate d/c tomorrow with oxygen. Per MD, pt getting bronchoscopy today.     No walk test or oxygen order at this time. LSW to discuss d/c plan with pt after procedure.     Escalations Completed: None    Medically Clear: No    Next Steps: LSW to follow up with pt for oxygen choice.    Barriers to Discharge: Oxygen Delivery    Is the patient up for discharge tomorrow: Yes

## 2022-08-10 NOTE — ANESTHESIA TIME REPORT
Anesthesia Start and Stop Event Times     Date Time Event    8/10/2022 1340 Ready for Procedure     1346 Anesthesia Start     1455 Anesthesia Stop        Responsible Staff  08/10/22    Name Role Begin End    Beth Richey M.D. Anesth [id number]        Overtime Reason:  no overtime (within assigned shift)    Comments:

## 2022-08-10 NOTE — PROGRESS NOTES
Telemetry Shift Summary     Rhythm: Afib  HR:   Ectopy: fPVC, fTriplets, r-o Couplets, rBigeminy, 4-5 bts vtach  Measurements: -/08/-   (Per 0400 strip)  Normal Values  Rhythm: SR  HR:   Measurements: 0.12-0.20 / 0.04-0.10 / 0.30-0.52    Strip reviewed and placed in chart

## 2022-08-10 NOTE — PROGRESS NOTES
Pulmonary Progress Note    Date of admission  8/8/2022    Chief Complaint  62 y.o. female admitted 8/8/2022 with abnormal CT chest.     Hospital Course  62 y.o. female who presented 8/8/2022 with abnormal CT chest.  Patient with significant history of self reported COPD, alcoholism, and depression who presents to [facility] with complaints of abdominal pain. Further workup revealed patient was in A fib w/ RVR. She was started on diltiazem infusion and systemic AC. CTA showed emphysematous changes, left hilar mass obstructing MIGNON and small left pleural effusion. Patient was transferred here to [facility] for further workup. She was scheduled for bronchoscopy/EBUS yesterday but transfer was delayed due to insurance authorization. Pulmonary consulted for further evaluation.     8/10: No acute issues overnight. Scheduled for bronchoscopy/EBUS w/ biopsy/BAL today at 1 pm.     Interval Problem Update  Reviewed last 24 hour events:  No acute issues overnight   Denies chest pain, N/V, fever/chills, or abdominal pain. Patient is anxious to get through the procedure and hopefully be discharge by tomorrow.      Review of Systems  Review of Systems   Constitutional:  Negative for chills, fever, malaise/fatigue and weight loss.   HENT: Negative.     Respiratory:  Positive for cough and shortness of breath. Negative for hemoptysis and sputum production.    Cardiovascular:  Negative for chest pain, palpitations, orthopnea, claudication and leg swelling.   Gastrointestinal: Negative.    Genitourinary: Negative.    Musculoskeletal: Negative.    Skin: Negative.    All other systems reviewed and are negative.     Vital Signs for last 24 hours   Temp:  [36.3 °C (97.4 °F)-36.7 °C (98.1 °F)] 36.7 °C (98 °F)  Pulse:  [] 94  Resp:  [18-20] 20  BP: (101-122)/(58-79) 118/79  SpO2:  [90 %-97 %] 95 %    Hemodynamic parameters for last 24 hours       Respiratory Information for the last 24 hours       Physical Exam   Physical  Exam  Constitutional:       Appearance: She is obese.   HENT:      Head: Normocephalic.      Nose: Nose normal.   Eyes:      Pupils: Pupils are equal, round, and reactive to light.   Cardiovascular:      Rate and Rhythm: Normal rate and regular rhythm.      Pulses: Normal pulses.   Pulmonary:      Effort: Pulmonary effort is normal.      Comments: Decrease BS right base     Abdominal:      General: Abdomen is flat. Bowel sounds are normal. There is no distension.      Palpations: Abdomen is soft.   Musculoskeletal:         General: Normal range of motion.      Cervical back: Normal range of motion.      Right lower leg: No edema.      Left lower leg: No edema.   Skin:     General: Skin is warm.   Neurological:      Mental Status: She is alert and oriented to person, place, and time.      Cranial Nerves: No cranial nerve deficit.   Psychiatric:         Mood and Affect: Mood normal.       Medications  Current Facility-Administered Medications   Medication Dose Route Frequency Provider Last Rate Last Admin    lidocaine (XYLOCAINE) 1 % injection 20 mL  20 mL Other Once Kane Ma D.O.        ipratropium-albuterol (DUONEB) nebulizer solution  3 mL Nebulization 4X/DAY (RT) Jovanni Reed D.O.        Respiratory Therapy Consult   Nebulization Continuous RT Melony Rhodes M.D.        ipratropium-albuterol (DUONEB) nebulizer solution  3 mL Nebulization Q2HRS PRN (RT) Melony Rhodes M.D.        senna-docusate (PERICOLACE or SENOKOT S) 8.6-50 MG per tablet 2 Tablet  2 Tablet Oral BID Melony Rhodes M.D.   2 Tablet at 08/09/22 1800    And    polyethylene glycol/lytes (MIRALAX) PACKET 1 Packet  1 Packet Oral QDAY PRN Melony Rhodes M.D.        And    magnesium hydroxide (MILK OF MAGNESIA) suspension 30 mL  30 mL Oral QDAY PRN Melony Rhodes M.D.        And    bisacodyl (DULCOLAX) suppository 10 mg  10 mg Rectal QDAY PRN Melony Rhodes M.D.        acetaminophen (Tylenol) tablet 650 mg  650 mg Oral Q6HRS PRN Melony VILLAREAL  CELY Rhodes   650 mg at 08/09/22 1541    ondansetron (ZOFRAN) syringe/vial injection 4 mg  4 mg Intravenous Q4HRS PRN Melony Rhodes M.D.        ondansetron (ZOFRAN ODT) dispertab 4 mg  4 mg Oral Q4HRS PRN Melony Rhodes M.D.        promethazine (PHENERGAN) tablet 12.5-25 mg  12.5-25 mg Oral Q4HRS PRN Melony Rhodes M.D.        promethazine (PHENERGAN) suppository 12.5-25 mg  12.5-25 mg Rectal Q4HRS PRN Melony Rhodes M.D.        prochlorperazine (COMPAZINE) injection 5-10 mg  5-10 mg Intravenous Q4HRS PRN Melony Rhodes M.D.        labetalol (NORMODYNE/TRANDATE) injection 10 mg  10 mg Intravenous Q4HRS PRN Melony Rhodes M.D.        LORazepam (ATIVAN) tablet 0.5 mg  0.5 mg Oral Q4HRS PRN Melony Rhodes M.D.        LORazepam (ATIVAN) tablet 1 mg  1 mg Oral Q4HRS PRN Melony Rhodes M.D.        LORazepam (ATIVAN) tablet 2 mg  2 mg Oral Q2HRS PRN Melony Rhodes M.D.        LORazepam (ATIVAN) tablet 3 mg  3 mg Oral Q HOUR PRN Melony Rhodes M.D.        LORazepam (ATIVAN) tablet 4 mg  4 mg Oral Q15 MIN PRN Melony Rhodes M.D.        Or    LORazepam (ATIVAN) injection 2 mg  2 mg Intravenous Q15 MIN PRN Melony Rhodes M.D.        thiamine (Vitamin B-1) tablet 100 mg  100 mg Oral DAILY Melony Rhodes M.D.   100 mg at 08/10/22 0525    And    multivitamin (THERAGRAN) tablet 1 Tablet  1 Tablet Oral DAILY Melony Rhodes M.D.   1 Tablet at 08/10/22 0524    And    folic acid (FOLVITE) tablet 1 mg  1 mg Oral DAILY Melony Rhodes M.D.   1 mg at 08/10/22 0525    guaiFENesin dextromethorphan (ROBITUSSIN DM) 100-10 MG/5ML syrup 10 mL  10 mL Oral Q6HRS PRN Melony Rhodes M.D.        insulin regular (HumuLIN R,NovoLIN R) injection  1-6 Units Subcutaneous Q6HRS Melony Rhodes M.D.   3 Units at 08/09/22 1802    And    dextrose 10 % BOLUS 25 g  25 g Intravenous Q15 MIN PRN Melony Rhodes M.D.        nicotine (NICODERM) 21 MG/24HR 21 mg  21 mg Transdermal Daily-0600 Melony Rhodes M.D.   21 mg at 08/10/22 0525    And     nicotine polacrilex (NICORETTE) 2 MG piece 2 mg  2 mg Oral Q HOUR PRN Melony Rhodes M.D.        predniSONE (DELTASONE) tablet 40 mg  40 mg Oral DAILY Melony Rhodes M.D.   40 mg at 08/10/22 0525    cefTRIAXone (Rocephin) 2 g in  mL IVPB  2 g Intravenous Q EVENING Melony Rhodes M.D.   Stopped at 08/09/22 1830    azithromycin (ZITHROMAX) tablet 500 mg  500 mg Oral Q EVENING Melony Rhodes M.D.   500 mg at 08/09/22 1801    DILTIAZem CD (CARDIZEM CD) capsule 240 mg  240 mg Oral Q DAY Melony Rhodes M.D.   240 mg at 08/10/22 0524    budesonide-formoterol (SYMBICORT) 80-4.5 MCG/ACT inhaler 2 Puff  2 Puff Inhalation BID (RT) Melony Rhodes M.D.   2 Puff at 08/09/22 2042    traZODone (DESYREL) tablet 50 mg  50 mg Oral Nightly Jonnie Middleton M.D.   50 mg at 08/09/22 2047       Fluids    Intake/Output Summary (Last 24 hours) at 8/10/2022 0915  Last data filed at 8/9/2022 1700  Gross per 24 hour   Intake 720 ml   Output --   Net 720 ml       Laboratory          Recent Labs     08/08/22 2136 08/09/22 0258 08/10/22  0337   SODIUM 136 139 137   POTASSIUM 3.9 3.5* 3.5*   CHLORIDE 97 101 101   CO2 22 25 25   BUN 26* 24* 21   CREATININE 1.03 0.82 0.77   MAGNESIUM  --  2.6* 2.3   PHOSPHORUS  --  2.2* 2.8   CALCIUM 8.4 8.2* 8.0*     Recent Labs     08/08/22 2136 08/09/22  0258 08/10/22  0337   ALTSGPT 16 15  --    ASTSGOT 13 11*  --    ALKPHOSPHAT 51 45  --    TBILIRUBIN 0.5 0.3  --    GLUCOSE 223* 177* 114*     Recent Labs     08/08/22 2136 08/09/22 0258 08/10/22  0337   WBC 12.4* 11.2* 10.6   NEUTSPOLYS 89.80* 87.90* 79.50*   LYMPHOCYTES 4.00* 3.80* 6.30*   MONOCYTES 5.10 7.20 12.70   EOSINOPHILS 0.00 0.00 0.00   BASOPHILS 0.10 0.10 0.20   ASTSGOT 13 11*  --    ALTSGPT 16 15  --    ALKPHOSPHAT 51 45  --    TBILIRUBIN 0.5 0.3  --      Recent Labs     08/08/22  2136 08/09/22  0258 08/10/22  0337   RBC 4.78 4.36 4.35   HEMOGLOBIN 15.7 14.3 14.2   HEMATOCRIT 47.1* 43.0 43.0   PLATELETCT 149* 131* 145*   PROTHROMBTM   --  14.3  --    INR  --  1.16*  --        Imaging  X-Ray:  No film today    Assessment/Plan  * Lung mass- (present on admission)  Assessment & Plan  -- Left hilar mass vs endobronchial lesion concerning for malignancy until proven otherwise   -- Pleural effusion is too small for a safe bedside thoracentesis   -- Scheduled for bronchoscopy/EBUS w/ biopsy today at 1 pm    -- Discussed about risks and benefits of undergoing bronchoscopy/EBUS with TBNA and BAL. Risks discussed include but were not limited to bleeding, pneumothorax, respiratory failure requiring ventilatory support, infection, and possible death. Questions were encouraged and answered. Patient endorsed understanding and agreed to proceed with it.   -- Further recommendation pending biopsy results       COPD with exacerbation  Assessment & Plan  -- Needs outpatient PFTs to confirm airflow obstruction   -- Smoking cessation advised   -- Cont prednisone 40 mg X 5 days   -- On symbicort       Acute respiratory failure with hypoxia (HCC)- (present on admission)  Assessment & Plan  -- Secondary to lung mass w/ collapse and ? Post obstructive PNA   -- On CAP coverage   -- Scheduled for bronchoscopy/EBUS w/ biopsy today   -- Cont CAP coverage X 7 days   -- HOB elevation   -- Aspiration precaution   -- Goal SpO2 > 88%          VTE:   SCD  Ulcer: Not Indicated  Lines: None    I have performed a physical exam and reviewed and updated ROS and Plan today (8/10/2022). In review of yesterday's note (8/9/2022), there are no changes except as documented above.     Discussed patient condition and risk of morbidity and/or mortality with Hospitalist, Family, RN, RT, and Patient

## 2022-08-10 NOTE — RESPIRATORY CARE
"   COPD EDUCATION by COPD CLINICAL EDUCATOR  8/10/2022 at 11:39 AM by Akila Lowe, RRT     Patient reviewed by COPD education team. Patient does have a history or diagnosis of COPD. Patient is a smoker, smoking cessation education done. A comprehensive packet with tips to quit and information on outpatient classes given to patient.     Smoking Cessation Intervention and education completed, 15 minutes spent on smoking cessation education with patient.  Provided smoking cessation packet with \"Tips to Quit\" and brochure for \"Free Smoking Cessation Classes\".     Provided a short intervention completed with this patient covering: What is COPD (how the lungs work), daily medications rescue and maintenance, breathing techniques, infection prevention and oxygen safety were covered in detail.  A comprehensive packet including information about COPD, treatments, and oxygen safety was given.            COPD Screen  COPD Risk Screening  Do you have a history of COPD?: Yes  Do you have a Pulmonologist?: No  COPD Population Screener  During the past 4 weeks, how much did you feel short of breath?: None/Little of the time  Do you ever cough up any mucus or phlegm?: No/only with occasional colds or infections  In the past 12 months, you do less than you used to because of your breathing problems: Disagree/unsure  Have you smoked at least 100 cigarettes in your entire life?: Yes  How old are you?: 60+  COPD Screening Score: 4  COPD Coordinator Recommended: Yes    COPD Assessment  COPD Clinical Specialists ONLY  COPD Education Initiated: Yes--Short Intervention (Pt states is gong to quit smoking will be hard since  smokes, Pt is self reported COPD, had PFT 17-18 yrs ago, has been non compliant with medications, just receivd Symbicort and Albuterol inhalers from last hospital and spacer Action plan given -)  DME Company: TBD?  Physician Name: None  Pulmonologist Name: None - pt has Lung mass is being bronched by Dr." "Citlaly  Referrals Initiated: Yes  Pulmonary Rehab: Declined  Smoking Cessation: Yes  $ Smoking Cessation >10 Minutes: Symptomatic  Smoking Pack Years: 37 yrs  Hospice: Declined  Home Health Care: N/A  [facility] Outreach: N/A  [facility]: N/A  [facility]  Private In-Home Care Agency: N/A  Is this a COPD exacerbation patient?: No  (OP) Pulmonary Function Testing: Yes    PFT Results    No results found for: PFT    Meds to Beds  Would the patient like to opt in for Bedside Medication Delivery at Discharge?: Yes, interested     MY COPD ACTION PLAN     It is recommended that patients and physicians /healthcare providers complete this action plan together. This plan should be discussed at each physician visit and updated as needed.    The green, yellow and red zones show groups of symptoms of COPD. This list of symptoms is not comprehensive, and you may experience other symptoms. In the \"Actions\" column, your healthcare provider has recommended actions for you to take based on your symptoms.    Patient Name: Geena Richey   YOB: 1960   Last Updated on: 8/10/2022 11:38 AM   Green Zone:  I am doing well today Actions     Usual activitiy and exercise level   Take daily medications     Usual amounts of cough and phlegm/mucus   Use oxygen as prescribed     Sleep well at night   Continue regular exercise/diet plan     Appetite is good   At all times avoid cigarette smoke, inhaled irritants     Daily Medications (these medications are taken every day):   Budesonide-Formoterol Fumarate (Symbicort)  Albuterol (Accuneb, Proair, Proventil, Ventolin) 2 Puffs  2 Puffs Twice daily  Every 4 hours PRN     Additional Information:  Use Spacer with medication and rinse mouth    Yellow Zone:  I am having a bad day or a COPD flare Actions     More breathless than usual   Continue daily medications     I have less energy for my daily activities   Use quick relief inhaler as ordered     " "Increased or thicker phlegm/mucus   Use oxygen as prescribed     Using quick relief inhaler/nebulizer more often   Get plenty of rest     Swelling of ankles more than usual   Use pursed lip breathing     More coughing than usual   At all times avoid cigarette smoke, inhaled irritants     I feel like I have a \"chest cold\"     Poor sleep and my symptoms woke me up     My appetite is not good     My medicine is not helping      Call provider immediately if symptoms don’t improve     Continue daily medications, add rescue medications:   Albuterol 2 Puffs Every 4 hours       Medications to be used during a flare up, (as Discussed with Provider):           Additional Information:  Call provider if symptoms do not improve    Red Zone:  I need urgent medical care Actions     Severe shortness of breath even at rest   Call 911 or seek medical care immediately     Not able to do any activity because of breathing      Fever or shaking chills      Feeling confused or very drowsy       Chest pains      Coughing up blood                  "

## 2022-08-10 NOTE — PROGRESS NOTES
[facility] Medicine Daily Progress Note    Date of Service  8/10/2022    Chief Complaint  Geena Richey is a 62 y.o. female admitted 8/8/2022 with abdominal pain.     Hospital Course  Geena Richey is a 62 y.o. female who presented 8/8/2022 with Abd pain. This is a female with a history of alcohol abuse (6-12 drinks daily), h/o endometrial cancer, COPD, chronic pain, depression, and overactive bladder who presented to [facility] with complaints of abdominal pain with nausea and emesis. On presentation there, she was found to be in new onset afib and was started on a dilt drip. CTA was obtained and demonstrated a left hilar mass with compression of the bronchus in the left upper lobe with partial obstruction, with concurrent pleural effusion, but no PTE.  She required 3L in at [facility]. For her afib, she was transitioned off of dilt drip onto oral dilt.       Interval Problem Update  Patient was seen and examined at bedside.  No acute events overnight. Patient is resting comfortably in bed and in no acute distress.     Pulm recs  Bronch today  3L supplemental oxygen  Rocephin, Azithromycin  CIWA  Requested home oxygen evaluation  Anticipate discharge in AM    I have discussed this patient's plan of care and discharge plan at IDT rounds today with Case Management, Nursing, Nursing leadership, and other members of the IDT team.    Consultants/Specialty  pulmonary    Code Status  Full Code    Disposition  Patient is not medically cleared for discharge.   Anticipate discharge to to home with close outpatient follow-up.  I have placed the appropriate orders for post-discharge needs.    Review of Systems  Review of Systems   Constitutional:  Negative for chills and fever.   HENT:  Negative for congestion and sore throat.    Eyes:  Negative for blurred vision and double vision.   Respiratory:  Positive for cough and shortness of breath.    Cardiovascular:  Negative for chest pain and palpitations.    Gastrointestinal:  Negative for abdominal pain, diarrhea, nausea and vomiting.   Genitourinary:  Negative for dysuria and frequency.   Musculoskeletal:  Negative for back pain and falls.   Skin:  Negative for rash.   Neurological:  Negative for seizures and loss of consciousness.   Endo/Heme/Allergies:  Does not bruise/bleed easily.   Psychiatric/Behavioral:  Negative for hallucinations. The patient is not nervous/anxious.       Physical Exam  Temp:  [36.3 °C (97.4 °F)-36.8 °C (98.2 °F)] 36.8 °C (98.2 °F)  Pulse:  [] 94  Resp:  [18-20] 18  BP: (101-128)/(58-89) 128/89  SpO2:  [92 %-97 %] 93 %    Physical Exam  Vitals and nursing note reviewed.   Constitutional:       General: She is not in acute distress.     Appearance: She is not toxic-appearing.      Comments: Chronically ill appearing   HENT:      Head: Normocephalic.      Right Ear: External ear normal.      Left Ear: External ear normal.      Nose: No congestion.      Mouth/Throat:      Mouth: Mucous membranes are moist.      Pharynx: No oropharyngeal exudate.   Eyes:      General: No scleral icterus.     Pupils: Pupils are equal, round, and reactive to light.   Cardiovascular:      Rate and Rhythm: Tachycardia present.      Heart sounds: No murmur heard.  Pulmonary:      Breath sounds: No wheezing.   Abdominal:      Palpations: Abdomen is soft.      Tenderness: There is no abdominal tenderness. There is no guarding or rebound.   Musculoskeletal:         General: No swelling or deformity.   Skin:     Coloration: Skin is not jaundiced.      Findings: No bruising.   Neurological:      General: No focal deficit present.      Mental Status: She is alert and oriented to person, place, and time. Mental status is at baseline.      Motor: No weakness.   Psychiatric:         Mood and Affect: Mood normal.         Behavior: Behavior normal.   Patient was seen and examined at bedside. No changes in physical exam from prior evaluation.    Fluids    Intake/Output  Summary (Last 24 hours) at 8/10/2022 1457  Last data filed at 8/10/2022 1454  Gross per 24 hour   Intake 1140 ml   Output 10 ml   Net 1130 ml       Laboratory  Recent Labs     08/08/22 2136 08/09/22 0258 08/10/22  0337   WBC 12.4* 11.2* 10.6   RBC 4.78 4.36 4.35   HEMOGLOBIN 15.7 14.3 14.2   HEMATOCRIT 47.1* 43.0 43.0   MCV 98.5* 98.6* 98.9*   MCH 32.8 32.8 32.6   MCHC 33.3* 33.3* 33.0*   RDW 48.4 47.8 48.2   PLATELETCT 149* 131* 145*   MPV 12.0 12.3 12.2     Recent Labs     08/08/22  2136 08/09/22  0258 08/10/22  0337   SODIUM 136 139 137   POTASSIUM 3.9 3.5* 3.5*   CHLORIDE 97 101 101   CO2 22 25 25   GLUCOSE 223* 177* 114*   BUN 26* 24* 21   CREATININE 1.03 0.82 0.77   CALCIUM 8.4 8.2* 8.0*     Recent Labs     08/09/22  0258   INR 1.16*         Recent Labs     08/09/22  0258   TRIGLYCERIDE 93   HDL 46   LDL 78       Imaging  DX-CHEST-2 VIEWS   Final Result         1.  Patchy left pulmonary infiltrates.   2.  Moderate layering left pleural effusion           Assessment/Plan  * Lung mass- (present on admission)  Assessment & Plan  - Long time smoker, hx of endometrial ca  - hilar mass with compression of the bronchus in the MIGNON with partial obstruction and concurrent pleural effusion  - Check CXR for baseline  - Bronch today  - Hold anticoagulation today    Acute respiratory failure with hypoxia (HCC)- (present on admission)  Assessment & Plan  - pt requiring 3L  - No active wheezing on exam but was on steroids at outside facility - continue Prednisone  - Hilar lung mass at outside facility with compression of the left upper lobe bronchus       A-fib (HCC)- (present on admission)  Assessment & Plan  - New onset afib at outside facility  - Now on oral Diltiazem,   - Echo  - TSH   - Hold anticoagulation in setting of possible bronch    Tobacco abuse  Assessment & Plan  Tobacco cessation counseling and education provided for 4 minutes. Nicotine replacement options provided including patch, and further medical  treatments including Wellbutrin and Chantix. As well as over the counter options of lozenges and gum.      COPD with exacerbation  Assessment & Plan  - Continue home Symbicort  - PRN duonebs  - RT consult and flutter valve  - Oral prednisone  - Rocephin/Azithro  - CXR    Alcohol dependence (HCC)- (present on admission)  Assessment & Plan  -6-12 drinks daily  - CIWA       Elevated brain natriuretic peptide (BNP) level- (present on admission)  Assessment & Plan  - Echo   - Does not appear overloaded         VTE prophylaxis: SCDs/TEDs    I have performed a physical exam and reviewed and updated ROS and Plan today (8/10/2022). In review of yesterday's note (8/9/2022), there are no changes except as documented above.

## 2022-08-10 NOTE — ANESTHESIA PROCEDURE NOTES
Airway    Date/Time: 8/10/2022 1:56 PM  Performed by: Beth Richey M.D.  Authorized by: Beth Richey M.D.     Location:  OR  Urgency:  Elective  Indications for Airway Management:  Anesthesia      Spontaneous Ventilation: absent    Sedation Level:  Deep  Preoxygenated: Yes    Patient Position:  Sniffing  Mask Difficulty Assessment:  2 - vent by mask + OA or adjuvant +/- NMBA  Final Airway Type:  Endotracheal airway  Final Endotracheal Airway:  ETT  Cuffed: Yes    Technique Used for Successful ETT Placement:  Direct laryngoscopy    Insertion Site:  Oral  Blade Type:  Lindsey  Laryngoscope Blade/Videolaryngoscope Blade Size:  3  ETT Size (mm):  8.5  Measured from:  Teeth  ETT to Teeth (cm):  21  Placement Verified by: capnometry and palpation of cuff    Cormack-Lehane Classification:  Grade IIa - partial view of glottis  Number of Attempts at Approach:  1

## 2022-08-10 NOTE — OR NURSING
1452: To PACU post bronchoscopy. Pt is extubated, breathing is spontaneous and unlabored.    1514: No change.    1525: Meets criteria for  room. Awaiting xray.

## 2022-08-10 NOTE — CARE PLAN
The patient is Stable - Low risk of patient condition declining or worsening    Shift Goals  Clinical Goals: NPO at midnight  Patient Goals: Sleep    Progress made toward(s) clinical / shift goals:  Patient is NPO     Problem: Knowledge Deficit - Standard  Goal: Patient and family/care givers will demonstrate understanding of plan of care, disease process/condition, diagnostic tests and medications  Outcome: Progressing  Note: Updated patient on plan of care including medications and treatments. Encouraged verbalization of questions and concerns. All concerns have been addressed at this time.      Problem: Knowledge Deficit - COPD  Goal: Patient/significant other demonstrates understanding of disease process, utilization of the Action Plan, medications and discharge instruction  Outcome: Progressing  Note: Updated patient on plan of care including medications and treatments. Encouraged verbalization of questions and concerns. All concerns have been addressed at this time.        Patient is not progressing towards the following goals:

## 2022-08-10 NOTE — ANESTHESIA PREPROCEDURE EVALUATION
Case: [id number] Date/Time: 08/10/22 1245    Procedures:       BRONCHOSCOPY WITH ENDOBRONCHIAL ULTRASOUND      ENDOBRONCHIAL ULTRASOUND (EBUS)    Location:  PROCEDURE ROOM / SURGERY [facility]    Surgeons: Bonnie Boucher M.D.        61yo w/left lung mass    Relevant Problems   PULMONARY   (positive) COPD (chronic obstructive pulmonary disease) (HCC)   (positive) COPD with exacerbation      CARDIAC   (positive) A-fib (HCC)      Other   (positive) Acute respiratory failure with hypoxia (HCC)   (positive) Alcohol dependence (HCC)   (positive) Elevated brain natriuretic peptide (BNP) level   (positive) Lung mass   (positive) Tobacco abuse     Denies CAD, CP, CVA, HTN, DM, LIVER/KIDNEY DZ, GERD, URI    Physical Exam    Airway   Mallampati: II  TM distance: >3 FB  Neck ROM: full       Cardiovascular   Rhythm: irregular  Rate: abnormal  (-) murmur     Dental   (+) upper dentures, lower dentures           Pulmonary   (+) decreased breath sounds  Comments: Decreased BS left lung   Abdominal    Neurological - normal exam                 Anesthesia Plan    ASA 3   ASA physical status 3 criteria: COPD and alcohol and/or substance dependence or abuse    Plan - general       Airway plan will be ETT                Pertinent diagnostic labs and testing reviewed    Informed Consent:    Anesthetic plan and risks discussed with patient.    Use of blood products discussed with: patient whom consented to blood products.

## 2022-08-10 NOTE — OR NURSING
1323: Updated pt's boyfriend about the procedure being delayed possible up to 1 1/2 hours. The MD in front of the MD doing pt's procedure got behind.

## 2022-08-10 NOTE — CARE PLAN
Problem: Impaired Gas Exchange  Goal: Patient will demonstrate improved ventilation and adequate oxygenation and participate in treatment regimen within the level of ability/situation.  Description: Target End Date:  Prior to discharge or change in level of care    1.   Assess/monitor rate/rhythm/depth of effort of respirations  2.   Assess oxygenation as ordered  3.   Administer/titrate oxygen as ordered  4.   Position patient for maximum ventilatory efficiency  5.   Turn, cough, and deep breathe  6.   Vital signs, pulse oximetry  7.   Assess color and body temperature  8.   Assess and monitor breath sounds  9.   Encourage deep-slow or pursed-lip breathing exercises  10. Monitor changes in the level of consciousness and mental status  11. Encourage expectoration of sputum; airway suctioning  12. Elevate the head of the bed and position patient for maximum ventilatory efficiency  13. Provide a calm, quiet environment  14. Limit patient's activity during the acute phase and have patient resume activity gradually and increase as individually tolerated  15. Evaluate sleep patterns and limit stimulants such as caffeine  16. Collaborate with RT to administer medications/treatments as ordered  Outcome: Progressing

## 2022-08-10 NOTE — ANESTHESIA POSTPROCEDURE EVALUATION
Patient: Geena Richey    Procedure Summary     Date: 08/10/22 Room / Location:  PROCEDURE ROOM / SURGERY [facility]    Anesthesia Start: 1346 Anesthesia Stop: 1455    Procedures:       BRONCHOSCOPY WITH ENDOBRONCHIAL ULTRASOUND      ENDOBRONCHIAL ULTRASOUND (EBUS) Diagnosis: (abnormal imaging)    Surgeons: Bonnie Boucher M.D. Responsible Provider: Beth Richey M.D.    Anesthesia Type: general ASA Status: 3          Final Anesthesia Type: general  Last vitals  BP   Blood Pressure: 128/89    Temp   36.8 °C (98.2 °F)    Pulse   94   Resp   18    SpO2   93%     Anesthesia Post Evaluation    Patient location during evaluation: PACU  Patient participation: complete - patient participated  Level of consciousness: awake  Pain score: 0    Airway patency: patent  Anesthetic complications: no  Cardiovascular status: adequate  Respiratory status: acceptable  Hydration status: acceptable    PONV: none          No notable events documented.     Nurse Pain Score: 0 (NPRS)

## 2022-08-10 NOTE — PROGRESS NOTES
Telemetry Shift Summary    Rhythm A-Fib  Mult. Episodes of VT 4-5 beats  HR Range    Ectopy F-PVC/Trig/Coups  Measurements -/0.10/-        Normal Values  Rhythm SR  HR Range    Measurements 0.12-0.20 / 0.06-0.10  / 0.30-0.52

## 2022-08-10 NOTE — CARE PLAN
The patient is Stable - Low risk of patient condition declining or worsening    Shift Goals  Clinical Goals:  (Bronchoscopy and possible discharge)  Patient Goals: go home    Progress made toward(s) clinical / shift goals:    Problem: Knowledge Deficit - COPD  Goal: Patient/significant other demonstrates understanding of disease process, utilization of the Action Plan, medications and discharge instruction  Outcome: Progressing     Problem: Ineffective Airway Clearance  Goal: Patient will maintain patent airway with clear/clearing breath sounds  Outcome: Progressing  Flowsheets (Taken 8/10/2022 1054)  Ineffective Airway Clearance:   Assessed breath sounds   Ambulated patient   Assisted with measures to improve effectiveness of cough effort   Assisted with patient positioning     Problem: Impaired Gas Exchange  Goal: Patient will demonstrate improved ventilation and adequate oxygenation and participate in treatment regimen within the level of ability/situation.  Outcome: Progressing  Flowsheets (Taken 8/10/2022 1054)  Impaired Gas Exchange:   Assessed oxygenation   Administered/titrated oxygen   Turn, cough, and deep breathe   Assessed vital signs, pulse oximetry   Assessed breath sounds   Elevated head of bed   Provided a calm, quiet environment       Patient is not progressing towards the following goals:

## 2022-08-10 NOTE — PROCEDURES
Fiberoptic Bronchoscopy/Endotracheal Ultrasound(EBUS)    Date/Time of Procedure: 8/10/2022    Indication(s): Abnormal CT chest     Consent: Informed, written consent was obtained prior to the procedure; risks, benefits, & alternatives were discussed.    Universal Protocol/Time Out: A Time Out with checklist was performed prior to the procedure to ensure correct identification of the patient and procedure.    Pre-Procedure Diagnosis: Abnormal CT chest     Sedation/Analgesia/Anesthesia: Sedation as per anesthesia.    Additional Modalities:    [_] Fluoroscopy  [_] Radial Ultrasound  [X] Linear Endobronchial Ultrasound  [X] Rapid On-Site Evaluation  [_] Other: _    Route of Entry:  [_] Nasal [_] Oral [X] ET tube [_] Trach [_] LMA     Findings: After the patient is adequately anesthetized (see procedure for anesthesia), the bronchoscopy was passed via ETT. The tracheobronchial tree of the right lung was examined to at least the first subsegment level. Bronchial mucousa and anatomy in the right lung are normal; there are no endobronchial lesions, and minimum clear secretion. The bronchoscope was retracted back to the abad and the left was examined. Upon entrance to the left mainstem there is fungating mass with white secretions completely obstructing the left mainstem. Difficult to pass bronchoscope via the mass. The bronchoscope was removed and EBUS was inserted via ETT.     With sequential evaluation of lymph node stations, there were no 10R or 4R LN stations visualized. Station 7 (8 mm oval) examined from left and three passes were made. Positive lymphocytes but no malignant cells identified. Station 4L (3 mm round) were identified and three passes performed. No lymphocytes identified with smear of blood. Station 10L (large 13 mm irregular oval lesion) identified and three passes were performed. Atypical cells seen but no obvious malignancy identified. Three additional passes were made for cell block.     The  bronchoscope was switched and passed via ETT without difficulty. Petechial and old blood noted from EBUS TBNA were suctioned. Brush biopsies were performed in the left mainstem endobronchial lesion. Endobronchial biopsies X 5 were performed in the left mainstem endobronchial lesion using a forceps. Wash performed in left mainstem. FRANC confirmed malignant cells identified on brush slides. Old blood were all suctioned. No signs of active bleed noted. Patient was taken to PACU in satisfactory condition. CXR ordered.         Specimens: _  [  ] Bronchoalveolar Lavage (BAL):   [X] Wash: Left mainstem   [X] Brush: Left mainstem mass   [_] Transbronchial Needle Aspiration (TBNA): _  [X] Endobronchial Biopsy (Endo): Left mainstem mass   [_] Transbronchial Biopsy (TBBx): _  [_] Other: _  [_] Veran Navigational Bronchoscopy:   [X] Endobronchial Ultrasound (EBUS) TBNA: 7, 4L, 10L      Estimated Blood Loss in mL: Minimum     Patient Response to Procedure: [X] Patient tolerated the procedure well. [_] Other    Complications: No immediate complications noted.     Post-Procedure Diagnosis: Malignancy     Post Procedure Follow Up/Comments:Follow up final path report     Bonnie Boucher MD   Pulmonary Critical Care   [facility]

## 2022-08-11 NOTE — DISCHARGE PLANNING
Case Management Discharge Planning    Admission Date: 8/8/2022  GMLOS: 4.9  ALOS: 3    6-Clicks ADL Score: 24  6-Clicks Mobility Score: 24      Anticipated Discharge Dispo:  Home with DME oxygen    DME Needed: Yes-oxygen    DME Ordered: Yes    Action(s) Taken: LSW completed chart review. Per review, MD placed oxygen order yesterday evening.     LSW called pt at [phone] to discuss oxygen choice. Pt verified address on face sheet ([address] [place] [address]). Pt verified medicaid insurance. Pt lives with boyfriend Ramírez who will pick her up today. Pt gave consent to send oxygen ref[facility] to 1[facility]. Pt states Ramírez will be here at 1000. LSW faxed DME choice to [facility].    0940: LSW notified by [facility] that pt can discharge with 1 tank and 1 port. Once home, pt will need to call [facility] to have them deliver concentrator and supplies. LSW notified bedside RN and charge RN. LSW placed [facility] number in AVS.    1050: Discussed pt in IDT rounds. Per MD, pt is pending MRI brain and will likely d/c tomorrow.    1134: LSW notified by RN that pt needs prior auth for symbicort. LSW called [facility] pharmacy, spoke to Reshma. Per Reshma, symbicort is covered with no co-pay. LSW notified RN.    Escalations Completed: None    Medically Clear: Yes    Next Steps: LSW to follow up with oxygen delivery.    Barriers to Discharge: Oxygen Delivery    Is the patient up for discharge tomorrow: No, today      Care Transition Team Assessment    Information Source  Orientation Level: Oriented X4  Who is responsible for making decisions for patient? : Patient         Elopement Risk  Legal Hold: No  Ambulatory or Self Mobile in Wheelchair: Yes  Disoriented: No  Psychiatric Symptoms: None  History of Wandering: No  Elopement this Admit: No  Vocalizing Wanting to Leave: No  Displays Behaviors, Body Language Wanting to Leave: No-Not at Risk for Elopement  Elopement Risk: Not at Risk for Elopement    Interdisciplinary  Discharge Planning  Lives with - Patient's Self Care Capacity: Significant Other  Patient or legal guardian wants to designate a caregiver: No  Support Systems: Family Member(s)  Housing / Facility: Mobile Home  Durable Medical Equipment: Not Applicable    Discharge Preparedness  What is your plan after discharge?: Home with help  What are your discharge supports?: Partner  Prior Functional Level: Ambulatory  Difficulity with ADLs: None  Difficulity with IADLs: None    Functional Assesment  Prior Functional Level: Ambulatory    Finances  Financial Barriers to Discharge: No    Vision / Hearing Impairment  Vision Impairment : Yes  Right Eye Vision: Wears Glasses  Left Eye Vision: Wears Glasses  Hearing Impairment : No              Domestic Abuse  Have you ever been the victim of abuse or violence?: No  Physical Abuse or Sexual Abuse: No  Verbal Abuse or Emotional Abuse: No  Possible Abuse/Neglect Reported to:: Not Applicable         Discharge Risks or Barriers  Discharge risks or barriers?: No    Anticipated Discharge Information  Discharge Disposition: Discharged to home/self care (01)  Discharge Address: [address] [place], [place] [address] [address]

## 2022-08-11 NOTE — PROGRESS NOTES
Telemetry Shift Summary    Rhythm A-Fib/Flutter  HR Range   Ectopy F-PVC, O-Trip, R-Coups  Measurements -/0.08/-        Normal Values  Rhythm SR  HR Range    Measurements 0.12-0.20 / 0.06-0.10  / 0.30-0.52

## 2022-08-11 NOTE — FACE TO FACE
"Face to Face Note  -  Durable Medical Equipment    Jovanni Reed D.O. - NPI: [id number]  I certify that this patient is under my care and that they had a durable medical equipment(DME)face to face encounter by myself that meets the physician DME face-to-face encounter requirements with this patient on:    Date of encounter:   Patient:                    MRN:                       YOB: 2021  Geena Richey  [id number]  1960     The encounter with the patient was in whole, or in part, for the following medical condition, which is the primary reason for durable medical equipment:  COPD    I certify that, based on my findings, the following durable medical equipment is medically necessary:    Oxygen   HOME O2 Saturation Measurements:(Values must be present for Home Oxygen orders)  Room air sat at rest: 90  Room air sat with amb: 82  With liters of O2: 4, O2 sat at rest with O2: 94  With Liters of O2: 4, O2 sat with amb with O2 : 88  Is the patient mobile?: Yes  If patient feels more short of breath, they can go up to 6 liters per minute and contact healthcare provider.    Supporting Symptoms: The patient requires supplemental oxygen, as the following interventions have been tried with limited or no improvement: \"Bronchodilators and/or steroid inhalers, \"Ambulation with oximetry, and \"Incentive spirometry.    My Clinical findings support the need for the above equipment due to:  Hypoxia  "

## 2022-08-11 NOTE — CARE PLAN
The patient is Stable - Low risk of patient condition declining or worsening    Shift Goals  Clinical Goals: Meds to bed delivered, home oxygen, possible discharge.  Patient Goals: go home    Progress made toward(s) clinical / shift goals:    Problem: Knowledge Deficit - Standard  Goal: Patient and family/care givers will demonstrate understanding of plan of care, disease process/condition, diagnostic tests and medications  Outcome: Progressing  Note: Pt educated on hydration and nutrition, smoking cessation, and discussed oxygenation needs. Reinforcement needed.      Problem: Self Care  Goal: Patient will have the ability to perform ADLs independently or with assistance (bathe, groom, dress, toilet and feed)  Outcome: Progressing  Note: No needs. Pt independently took a shower this afternoon.        Patient is not progressing towards the following goals:

## 2022-08-11 NOTE — DISCHARGE SUMMARY
Discharge Summary    CHIEF COMPLAINT ON ADMISSION  No chief complaint on file.      Reason for Admission  Perihyler mass    Admission Date  8/8/2022     CODE STATUS  Full Code    HPI & HOSPITAL COURSE  Geena Richey is a 62 y.o. female who presented 8/8/2022 with abd pain. This is a female with a history of alcohol abuse (6-12 drinks daily), h/o endometrial cancer, COPD, chronic pain, depression, and overactive bladder who presented to [facility] with complaints of abdominal pain with nausea and emesis. On presentation there, she was found to be in new onset afib and was started on a dilt drip. CTA was obtained and demonstrated a left hilar mass with compression of the bronchus in the left upper lobe with partial obstruction, with concurrent pleural effusion, but no PTE.  She required 3L in at [facility]. For her afib, she was transitioned off of dilt drip onto oral dilt. Upon presentation to [facility] pulmonology was consulted. WA protocol in setting of alcohol abuse. Started on Rocephin for CAP coverage. 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 Patient will be transferred to [facility] by oncology and rad onc for debulking therapy. MRI brain pending at time of transfer. Patient was determined satisfactory for discharge with appropriate follow up.    Therefore, she is discharged in fair and stable condition to tertiary care facility.    The patient met 2-midnight criteria for an inpatient stay at the time of discharge.      FOLLOW UP ITEMS POST DISCHARGE  Please follow up with PCP in 3-5 days for post hospitalization follow up and medication reconciliation.       DISCHARGE DIAGNOSES  Principal Problem:    Lung mass POA: Yes  Active Problems:    Acute respiratory failure with hypoxia (HCC) POA: Yes    A-fib (HCC) POA: Yes    Elevated brain natriuretic peptide (BNP) level POA: Yes    Alcohol  dependence (HCC) POA: Yes    COPD with exacerbation POA: Unknown    Tobacco abuse POA: Unknown  Resolved Problems:    * No resolved hospital problems. *      FOLLOW UP  Future Appointments   Date Time Provider Department Center   8/31/2022  3:40 PM Peg Cruz M.D. [facility] None     No follow-up provider specified.    MEDICATIONS ON DISCHARGE     Medication List        START taking these medications        Instructions   amoxicillin-clavulanate 875-125 MG Tabs  Commonly known as: AUGMENTIN   Take 1 Tablet by mouth 2 times a day for 5 days.  Dose: 1 Tablet     DILTIAZem  MG Cp24  Start taking on: August 12, 2022  Commonly known as: CARDIZEM CD   Take 1 Capsule by mouth every day for 30 days.  Dose: 300 mg     doxycycline 100 MG capsule  Commonly known as: MONODOX   Take 1 Capsule by mouth 2 times a day for 5 days.  Dose: 100 mg     predniSONE 20 MG Tabs  Start taking on: August 12, 2022  Commonly known as: DELTASONE   Take 1 tablet by mouth daily     Symbicort 80-4.5 MCG/ACT Aero  Generic drug: budesonide-formoterol   Inhale 2 Puffs by mouth 2 times a day.  Dose: 2 Puff     Ventolin  (90 Base) MCG/ACT Aers inhalation aerosol  Generic drug: albuterol   Inhale 2 Puffs every four hours as needed for Shortness of Breath for up to 30 days.  Dose: 2 Puff            CONTINUE taking these medications        Instructions   oxyCODONE-acetaminophen 7.5-325 MG per tablet  Commonly known as: PERCOCET   Take 1-2 Tabs by mouth every 6 hours as needed (pain).  Dose: 1-2 Tablet     traZODone 50 MG Tabs  Commonly known as: DESYREL   Take 50 mg by mouth every evening.  Dose: 50 mg            STOP taking these medications      ibuprofen 600 MG Tabs  Commonly known as: MOTRIN     metoclopramide 10 MG Tabs  Commonly known as: REGLAN              Allergies  No Known Allergies    DIET  Orders Placed This Encounter   Procedures    Diet Order Diet: Regular     Standing Status:   Standing     Number of Occurrences:   1      Order Specific Question:   Diet:     Answer:   Regular [1]       ACTIVITY  As tolerated.  Weight bearing as tolerated    LINES, DRAINS, AND WOUNDS  This is an automated list. Peripheral IVs will be removed prior to discharge.  Peripheral IV 08/11/22 20 G Anterior;Distal;Left Forearm (Active)          Peripheral IV 08/11/22 20 G Anterior;Distal;Left Forearm (Active)               MENTAL STATUS ON TRANSFER             CONSULTATIONS  Pulmonology    PROCEDURES  Bronchoscopy with biopsy 08/10    LABORATORY  Lab Results   Component Value Date    SODIUM 137 08/10/2022    POTASSIUM 3.5 (L) 08/10/2022    CHLORIDE 101 08/10/2022    CO2 25 08/10/2022    GLUCOSE 114 (H) 08/10/2022    BUN 21 08/10/2022    CREATININE 0.77 08/10/2022        Lab Results   Component Value Date    WBC 10.6 08/10/2022    HEMOGLOBIN 14.2 08/10/2022    HEMATOCRIT 43.0 08/10/2022    PLATELETCT 145 (L) 08/10/2022        Total time of the discharge process exceeds 33 minutes.

## 2022-08-11 NOTE — PROGRESS NOTES
Pulmonary Progress Note    Date of admission  8/8/2022    Chief Complaint  62 y.o. female admitted 8/8/2022 with abnormal CT chest.     Hospital Course  62 y.o. female who presented 8/8/2022 with abnormal CT chest.  Patient with significant history of self reported COPD, alcoholism, and depression who presents to [facility] with complaints of abdominal pain. Further workup revealed patient was in A fib w/ RVR. She was started on diltiazem infusion and systemic AC. CTA showed emphysematous changes, left hilar mass obstructing MIGNON and small left pleural effusion. Patient was transferred here to [facility] for further workup. She was scheduled for bronchoscopy/EBUS yesterday but transfer was delayed due to insurance authorization. Pulmonary consulted for further evaluation.     8/10: No acute issues overnight. Bronchoscopy/EBUS w/ biopsy/BAL -> complete occlusion of left mainstem. Biopsy preliminary read came back positive for malignancy pending final path.     8/11: No acute issues overnight. Remains on 3 liters NC. Cough is intermittent and productive in nature Denies hemoptysis, N/V, fever/chills, or abdominal pain. Ordered MRI brain for further staging. Dr. Espinoza from oncology consulted.     Interval Problem Update  Reviewed last 24 hour events:    Review of Systems  Review of Systems   Constitutional:  Negative for chills, fever, malaise/fatigue and weight loss.   HENT: Negative.     Respiratory:  Positive for cough and shortness of breath. Negative for hemoptysis and sputum production.    Cardiovascular:  Negative for chest pain, palpitations, orthopnea, claudication and leg swelling.   Gastrointestinal: Negative.    Genitourinary: Negative.    Musculoskeletal: Negative.    Skin: Negative.    All other systems reviewed and are negative.     Vital Signs for last 24 hours   Temp:  [36.5 °C (97.7 °F)-37.2 °C (98.9 °F)] 36.6 °C (97.8 °F)  Pulse:  [] 101  Resp:  [18-20] 18  BP: (113-149)/(60-87)  113/74  SpO2:  [90 %-97 %] 95 %    Hemodynamic parameters for last 24 hours       Respiratory Information for the last 24 hours       Physical Exam   Physical Exam  Constitutional:       Appearance: She is obese.   HENT:      Head: Normocephalic.      Nose: Nose normal.   Eyes:      Pupils: Pupils are equal, round, and reactive to light.   Cardiovascular:      Rate and Rhythm: Normal rate and regular rhythm.      Pulses: Normal pulses.   Pulmonary:      Effort: Pulmonary effort is normal.      Comments: Decrease BS right base     Abdominal:      General: Abdomen is flat. Bowel sounds are normal. There is no distension.      Palpations: Abdomen is soft.   Musculoskeletal:         General: Normal range of motion.      Cervical back: Normal range of motion.      Right lower leg: No edema.      Left lower leg: No edema.   Skin:     General: Skin is warm.   Neurological:      Mental Status: She is alert and oriented to person, place, and time.      Cranial Nerves: No cranial nerve deficit.   Psychiatric:         Mood and Affect: Mood normal.       Medications  Current Facility-Administered Medications   Medication Dose Route Frequency Provider Last Rate Last Admin    [START ON 8/12/2022] DILTIAZem CD (CARDIZEM CD) capsule 300 mg  300 mg Oral Q DAY Jovanni Reed D.O.        Respiratory Therapy Consult   Nebulization Continuous RT Melony Rhodes M.D.        ipratropium-albuterol (DUONEB) nebulizer solution  3 mL Nebulization Q2HRS PRN (RT) Melony Rhodes M.D.        senna-docusate (PERICOLACE or SENOKOT S) 8.6-50 MG per tablet 2 Tablet  2 Tablet Oral BID Melony Rhodes M.D.   2 Tablet at 08/09/22 1800    And    polyethylene glycol/lytes (MIRALAX) PACKET 1 Packet  1 Packet Oral QDAY PRN Melony Rhodes M.D.        And    magnesium hydroxide (MILK OF MAGNESIA) suspension 30 mL  30 mL Oral QDAY PRN Melony Rhodes M.D.        And    bisacodyl (DULCOLAX) suppository 10 mg  10 mg Rectal QDAY PRN Melony Rhodes M.D.         acetaminophen (Tylenol) tablet 650 mg  650 mg Oral Q6HRS PRN Melony Rhodes M.D.   650 mg at 08/10/22 1948    ondansetron (ZOFRAN) syringe/vial injection 4 mg  4 mg Intravenous Q4HRS PRN Melony Rhodes M.D.        ondansetron (ZOFRAN ODT) dispertab 4 mg  4 mg Oral Q4HRS PRN Melony Rhodes M.D.        promethazine (PHENERGAN) tablet 12.5-25 mg  12.5-25 mg Oral Q4HRS PRN Melony Rhodes M.D.        promethazine (PHENERGAN) suppository 12.5-25 mg  12.5-25 mg Rectal Q4HRS PRN Melony Rhodes M.D.        prochlorperazine (COMPAZINE) injection 5-10 mg  5-10 mg Intravenous Q4HRS PRN Melony Rhodes M.D.        labetalol (NORMODYNE/TRANDATE) injection 10 mg  10 mg Intravenous Q4HRS PRN Melony Rhodes M.D.        LORazepam (ATIVAN) tablet 0.5 mg  0.5 mg Oral Q4HRS PRN Melony Rhodes M.D.        LORazepam (ATIVAN) tablet 1 mg  1 mg Oral Q4HRS PRN Melony Rhodes M.D.        LORazepam (ATIVAN) tablet 2 mg  2 mg Oral Q2HRS PRN Melony Rhodes M.D.        LORazepam (ATIVAN) tablet 3 mg  3 mg Oral Q HOUR PRN Melony Rhodes M.D.        LORazepam (ATIVAN) tablet 4 mg  4 mg Oral Q15 MIN PRN Melony Rhodes M.D.        Or    LORazepam (ATIVAN) injection 2 mg  2 mg Intravenous Q15 MIN PRN Melony Rhodes M.D.        thiamine (Vitamin B-1) tablet 100 mg  100 mg Oral DAILY Melony Rhodes M.D.   100 mg at 08/11/22 0559    And    multivitamin (THERAGRAN) tablet 1 Tablet  1 Tablet Oral DAILY Melony Rhodes M.D.   1 Tablet at 08/11/22 0559    And    folic acid (FOLVITE) tablet 1 mg  1 mg Oral DAILY Melony Rhodes M.D.   1 mg at 08/11/22 0559    guaiFENesin dextromethorphan (ROBITUSSIN DM) 100-10 MG/5ML syrup 10 mL  10 mL Oral Q6HRS PRN Melony Rhodes M.D.        insulin regular (HumuLIN R,NovoLIN R) injection  1-6 Units Subcutaneous Q6HRS Melony Rhodes M.D.   1 Units at 08/10/22 2310    And    dextrose 10 % BOLUS 25 g  25 g Intravenous Q15 MIN PRN Melony Rhodes M.D.        nicotine (NICODERM) 21 MG/24HR 21 mg  21 mg  Transdermal Daily-0600 Melony Rhodes M.D.   21 mg at 08/11/22 0558    And    nicotine polacrilex (NICORETTE) 2 MG piece 2 mg  2 mg Oral Q HOUR PRN Melony Rhodes M.D.        predniSONE (DELTASONE) tablet 40 mg  40 mg Oral DAILY Melony Rhodes M.D.   40 mg at 08/11/22 0559    cefTRIAXone (Rocephin) 2 g in  mL IVPB  2 g Intravenous Q EVENING Melony Rhodes M.D. 200 mL/hr at 08/10/22 1726 2 g at 08/10/22 1726    budesonide-formoterol (SYMBICORT) 80-4.5 MCG/ACT inhaler 2 Puff  2 Puff Inhalation BID (RT) Melony Rhodes M.D.   2 Puff at 08/11/22 0651    traZODone (DESYREL) tablet 50 mg  50 mg Oral Nightly Jonnie Middleton M.D.   50 mg at 08/10/22 2031       Fluids    Intake/Output Summary (Last 24 hours) at 8/11/2022 1228  Last data filed at 8/10/2022 1454  Gross per 24 hour   Intake 900 ml   Output 10 ml   Net 890 ml         Laboratory          Recent Labs     08/08/22  2136 08/09/22  0258 08/10/22  0337   SODIUM 136 139 137   POTASSIUM 3.9 3.5* 3.5*   CHLORIDE 97 101 101   CO2 22 25 25   BUN 26* 24* 21   CREATININE 1.03 0.82 0.77   MAGNESIUM  --  2.6* 2.3   PHOSPHORUS  --  2.2* 2.8   CALCIUM 8.4 8.2* 8.0*       Recent Labs     08/08/22 2136 08/09/22 0258 08/10/22  0337   ALTSGPT 16 15  --    ASTSGOT 13 11*  --    ALKPHOSPHAT 51 45  --    TBILIRUBIN 0.5 0.3  --    GLUCOSE 223* 177* 114*       Recent Labs     08/08/22  2136 08/09/22  0258 08/10/22  0337   WBC 12.4* 11.2* 10.6   NEUTSPOLYS 89.80* 87.90* 79.50*   LYMPHOCYTES 4.00* 3.80* 6.30*   MONOCYTES 5.10 7.20 12.70   EOSINOPHILS 0.00 0.00 0.00   BASOPHILS 0.10 0.10 0.20   ASTSGOT 13 11*  --    ALTSGPT 16 15  --    ALKPHOSPHAT 51 45  --    TBILIRUBIN 0.5 0.3  --        Recent Labs     08/08/22  2136 08/09/22  0258 08/10/22  0337   RBC 4.78 4.36 4.35   HEMOGLOBIN 15.7 14.3 14.2   HEMATOCRIT 47.1* 43.0 43.0   PLATELETCT 149* 131* 145*   PROTHROMBTM  --  14.3  --    INR  --  1.16*  --          Imaging  X-Ray:  No film today    Assessment/Plan  * Lung mass-  (present on admission)  Assessment & Plan  -- 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 Espinoza and recommended transfer to [facility] for potential inpatient radiation vs chemotherapy   -- Check MRI brain for staging    -- Follow up final path   -- Given there is complete occlusion of left mainstem and unable to pass bronchoscope, patient would be at high risk for airway perforation from tumor debulking therapy and would not be a candidate at this time. Case d/w Dr. Guillermo whom is also in agreement with it.        COPD with exacerbation  Assessment & Plan  -- Needs outpatient PFTs to confirm airflow obstruction   -- Smoking cessation advised   -- Cont prednisone 40 mg X 5 days   -- On symbicort       Acute respiratory failure with hypoxia (HCC)- (present on admission)  Assessment & Plan  -- Secondary to lung mass w/ collapse and post obstructive PNA   -- On CAP coverage   -- Wash cx negative to date   -- Cont CAP coverage X 7 days   -- Biopsy came back positive for malignancy; pending final path    -- HOB elevation   -- Aspiration precaution   -- Goal SpO2 > 88%   -- Check 6 minute walk test to assess for home O2 therapy          VTE:  NOAC  Ulcer: Not Indicated  Lines: None    I have performed a physical exam and reviewed and updated ROS and Plan today (8/11/2022). In review of yesterday's note (8/10/2022), there are no changes except as documented above.     Discussed patient condition and risk of morbidity and/or mortality with Hospitalist, Family, RN, RT, and Patient

## 2022-08-11 NOTE — DISCHARGE PLANNING
Received Choice form at 0759  Agency/Facility Name: [facility]  Referral sent per Choice form @ 0876     Agency/Facility Name: [facility]  Spoke To: Dylan  Outcome: Per Dylan, she sent the order to [facility] to be processed. Per Dylan, she gave auth to take 1 port and one tank from hospital supply. Per Dylan, she also requested the pt call the [facility] office when she gets home to schedule O2 delivery.  LSW notified    Agency/Facility Name: [facility]  Spoke To: Dylan  Outcome: Per Dylan, order has been processed and is ready for delivery whenever the pt dc's home.

## 2022-08-11 NOTE — PROGRESS NOTES
Telemetry Shift Summary     Rhythm A-Fib  HR Range 's  Ectopy F-PVC, O-Trip, R-Coups, R-Bigeminy  Measurements -/0.08/-           Normal Values  Rhythm SR  HR Range    Measurements 0.12-0.20 / 0.06-0.10  / 0.30-0.52

## 2022-08-11 NOTE — DISCHARGE INSTR - CASE MGT
Please call [facility] at [phone] when you arrive home for them to deliver oxygen concentrator and supplies.

## 2022-08-12 PROBLEM — J98.09 LESION OF BRONCHUS: Status: ACTIVE | Noted: 2022-01-01

## 2022-08-12 PROBLEM — R91.8 ENDOBRONCHIAL MASS: Status: ACTIVE | Noted: 2022-01-01

## 2022-08-12 PROBLEM — C34.92 PRIMARY LUNG SQUAMOUS CELL CARCINOMA, LEFT (HCC): Status: ACTIVE | Noted: 2022-01-01

## 2022-08-12 NOTE — DISCHARGE PLANNING
Submitted request to Medicaid FFS for request to transfer for patient from [facility] to [facility] under PA # [id number] under DOS 8/12-8/14. Updated Ana in the transfer center.

## 2022-08-12 NOTE — PROGRESS NOTES
Telemetry Shift Summary     Rhythm: a-fib  HR Range:   Ectopy: r-oTRIGEM/BIGEM, fPVC, fCOUP  Measurements: -/0.10/-           Normal Values  Rhythm SR  HR Range    Measurements 0.12-0.20 / 0.06-0.10  / 0.30-0.52

## 2022-08-12 NOTE — PROGRESS NOTES
Transfer Center Note    =[facility] HOSPITALIST TRIAGE OFFICER DIRECT ADMISSION REPORT  Transferring facility: [facility]  Transferring physician: Dr Reed  Transferring facility/physician contact number: N/A  Chief complaint: Transfer for endobronchial lesion with occlusion.  Pertinent history & patient course:   63yo F diagnosed with L endobronchial lesion with L mainstem bronchus occlusion as well as NEW Afib currently rapid HR. Dr. Espinoza, Oncology recommended transfer and contacting [facility]. HR 100s on diltiazem PO. NOT on anticoagulation as this was initially held for bronch but nort restarted as CHADSVasc was 1. Currently 4LO2  Full ciode full treatment  Pertinent imaging & lab results: See EPIC  Code Status: FULL per transferring provider, I personally verified with the transferring provider patient's code status and the transferring provider has confirmed this with the patient.  Further work up or recommendations per triage officer prior to transfer: Treat HR, add aspirin and consider doing antiarrhythmic plan if not being anticoagulated. Call Rad Onc.   Consultants called prior to transfer and pertinent input from consultants: Oncology and RadOncology  Patient accepted for transfer: Yes  Consultants to be called upon arrival: Notify Dr. Espinoza Oncology and Rad Onc  Admission status: Inpatient.   Floor requested: Medical or Oncology  If ICU transfer, name of intensivist case discussed with and pertinent input from critical care: N/A    Please inform the triage officer upon arrival of the patient to [facility] for assignment of a hospitalist to perform admission.     For any question or concerns regarding the care of this patient, please reach out to the assigned hospitalist.

## 2022-08-12 NOTE — PROGRESS NOTES
[facility] Medicine Daily Progress Note    Date of Service  8/12/2022    Chief Complaint  Geena Richey is a 62 y.o. female admitted 8/8/2022 with abdominal pain.     Hospital Course  Geena Richey is a 62 y.o. female who presented 8/8/2022 with abd pain. This is a female with a history of alcohol abuse (6-12 drinks daily), h/o endometrial cancer, COPD, chronic pain, depression, and overactive bladder who presented to [facility] with complaints of abdominal pain with nausea and emesis. On presentation there, she was found to be in new onset afib and was started on a dilt drip. CTA was obtained and demonstrated a left hilar mass with compression of the bronchus in the left upper lobe with partial obstruction, with concurrent pleural effusion, but no PTE.  She required 3L in at [facility]. For her afib, she was transitioned off of dilt drip onto oral dilt. Upon presentation to [facility] pulmonology was consulted. WA protocol in setting of alcohol abuse. Started on Rocephin for CAP coverage. 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 Patient will be [facility] [place] by oncology and rad onc for debulking therapy. MRI brain pending at time of transfer. Patient was determined satisfactory for discharge with appropriate follow up.      Interval Problem Update  Patient was seen and examined at bedside.  No acute events overnight. Patient is resting comfortably in bed and in no acute distress.     Transfer requested to [facility] for oncology and radiation oncology evaluation for debulking of endobronchial lesion.  S/p bronch 08/10  Await final path report  Afib on cardizem - no indicaiton for anticoagulation at this time  MRI brain completed    I have discussed this patient's plan of care and discharge plan at IDT rounds today with Case Management, Nursing, Nursing leadership, and other members  of the IDT team.    Consultants/Specialty  pulmonary    Code Status  Full Code    Disposition  Patient is not medically cleared for discharge.   Anticipate discharge to to home with close outpatient follow-up.  I have placed the appropriate orders for post-discharge needs.    Review of Systems  Review of Systems   Constitutional:  Negative for chills and fever.   HENT:  Negative for congestion and sore throat.    Eyes:  Negative for blurred vision and double vision.   Respiratory:  Positive for cough and shortness of breath.    Cardiovascular:  Negative for chest pain and palpitations.   Gastrointestinal:  Negative for abdominal pain, diarrhea, nausea and vomiting.   Genitourinary:  Negative for dysuria and frequency.   Musculoskeletal:  Negative for back pain and falls.   Skin:  Negative for rash.   Neurological:  Negative for seizures and loss of consciousness.   Endo/Heme/Allergies:  Does not bruise/bleed easily.   Psychiatric/Behavioral:  Negative for hallucinations. The patient is not nervous/anxious.       Physical Exam  Temp:  [36.4 °C (97.6 °F)-36.9 °C (98.4 °F)] 36.9 °C (98.4 °F)  Pulse:  [] 84  Resp:  [16-22] 18  BP: (119-134)/() 119/70  SpO2:  [93 %-100 %] 95 %    Physical Exam  Vitals and nursing note reviewed.   Constitutional:       General: She is not in acute distress.     Appearance: She is not toxic-appearing.      Comments: Chronically ill appearing   HENT:      Head: Normocephalic.      Right Ear: External ear normal.      Left Ear: External ear normal.      Nose: No congestion.      Mouth/Throat:      Mouth: Mucous membranes are moist.      Pharynx: No oropharyngeal exudate.   Eyes:      General: No scleral icterus.     Pupils: Pupils are equal, round, and reactive to light.   Cardiovascular:      Rate and Rhythm: Tachycardia present.      Heart sounds: No murmur heard.  Pulmonary:      Breath sounds: No wheezing.   Abdominal:      Palpations: Abdomen is soft.      Tenderness: There  is no abdominal tenderness. There is no guarding or rebound.   Musculoskeletal:         General: No swelling or deformity.   Skin:     Coloration: Skin is not jaundiced.      Findings: No bruising.   Neurological:      General: No focal deficit present.      Mental Status: She is alert and oriented to person, place, and time. Mental status is at baseline.      Motor: No weakness.   Psychiatric:         Mood and Affect: Mood normal.         Behavior: Behavior normal.   Patient was seen and examined at bedside. No changes in physical exam from prior evaluation.    Fluids    Intake/Output Summary (Last 24 hours) at 8/12/2022 1418  Last data filed at 8/12/2022 1145  Gross per 24 hour   Intake 960 ml   Output --   Net 960 ml       Laboratory  Recent Labs     08/10/22  0337   WBC 10.6   RBC 4.35   HEMOGLOBIN 14.2   HEMATOCRIT 43.0   MCV 98.9*   MCH 32.6   MCHC 33.0*   RDW 48.2   PLATELETCT 145*   MPV 12.2     Recent Labs     08/10/22  0337   SODIUM 137   POTASSIUM 3.5*   CHLORIDE 101   CO2 25   GLUCOSE 114*   BUN 21   CREATININE 0.77   CALCIUM 8.0*                       Imaging  MR-BRAIN-WITH & W/O   Final Result      1.  There is no evidence of abnormal nodular parenchymal enhancement to suggest any parenchymal metastasis. There is no osseous lesion.   2.  There is an approximately 9 x 9 mm sized extra-axial lesion in the interhemispheric region likely representing an incidental meningioma.      EC-ECHOCARDIOGRAM COMPLETE W/O CONT   Final Result      DX-CHEST-LIMITED (1 VIEW)   Final Result      1.  Negative for pneumothorax      2.  Left lung partial collapse although ipsilateral shift has decreased      3.  Right lung interstitial densities consistent with edema or fibrosis      DX-CHEST-2 VIEWS   Final Result         1.  Patchy left pulmonary infiltrates.   2.  Moderate layering left pleural effusion           Assessment/Plan  * Primary lung squamous cell carcinoma, left (HCC)- (present on admission)  Assessment &  Plan  - Long time smoker, hx of endometrial ca  - hilar mass with compression of the bronchus in the MIGNON with partial obstruction and concurrent pleural effusion  S/p bronch 08/10  Await final path report  MRI brain completed    Acute respiratory failure with hypoxia (HCC)- (present on admission)  Assessment & Plan  - pt requiring 3L  - No active wheezing on exam but was on steroids at outside facility - continue Prednisone  - Hilar lung mass at outside facility with compression of the left upper lobe bronchus       A-fib (HCC)- (present on admission)  Assessment & Plan  - New onset afib at outside facility  - Now on oral Diltiazem  - Echo with preserved EF  - TSH   CHADSVASC 1 - no indication for antcoagulation  Concern for bleeding of endobronchial lesion as well on anticoagulation    Tobacco abuse  Assessment & Plan  Tobacco cessation counseling and education provided for 4 minutes. Nicotine replacement options provided including patch, and further medical treatments including Wellbutrin and Chantix. As well as over the counter options of lozenges and gum.      COPD with exacerbation  Assessment & Plan  - Continue home Symbicort  - PRN duonebs  - RT consult and flutter valve  - Oral prednisone  - Rocephin/Azithro  - CXR    Alcohol dependence (HCC)- (present on admission)  Assessment & Plan  -6-12 drinks daily  - CIWA       Elevated brain natriuretic peptide (BNP) level- (present on admission)  Assessment & Plan  - Echo   - Does not appear overloaded         VTE prophylaxis: SCDs/TEDs    I have performed a physical exam and reviewed and updated ROS and Plan today (8/12/2022). In review of yesterday's note (8/11/2022), there are no changes except as documented above.

## 2022-08-12 NOTE — PROGRESS NOTES
Telemetry Shift Summary    Rhythm A-Fib with episode of 4 beats VT  HR Range 113-135 high of 187  Ectopy F-PVC, O-Coups/Trips  Measurements -/0.08/-        Normal Values  Rhythm SR  HR Range    Measurements 0.12-0.20 / 0.06-0.10  / 0.30-0.52

## 2022-08-12 NOTE — PROGRESS NOTES
[facility] Medicine Daily Progress Note    Date of Service  8/12/2022    Chief Complaint  Geena Richey is a 62 y.o. female admitted 8/8/2022 with abdominal pain.     Hospital Course  Geena Richey is a 62 y.o. female who presented 8/8/2022 with abd pain. This is a female with a history of alcohol abuse (6-12 drinks daily), h/o endometrial cancer, COPD, chronic pain, depression, and overactive bladder who presented to [facility] with complaints of abdominal pain with nausea and emesis. On presentation there, she was found to be in new onset afib and was started on a dilt drip. CTA was obtained and demonstrated a left hilar mass with compression of the bronchus in the left upper lobe with partial obstruction, with concurrent pleural effusion, but no PTE.  She required 3L in at [facility]. For her afib, she was transitioned off of dilt drip onto oral dilt. Upon presentation to [facility] pulmonology was consulted. WA protocol in setting of alcohol abuse. Started on Rocephin for CAP coverage. 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 Patient will be transferred to [facility]ology and rad onc for debulking therapy. MRI brain pending at time of transfer. Patient was determined satisfactory for discharge with appropriate follow up.      Interval Problem Update  Patient was seen and examined at bedside.  No acute events overnight. Patient is resting comfortably in bed and in no acute distress.     Transfer requested to [facility] for oncology and radiation oncology evaluation for debulking of endobronchial lesion.  S/p bronch 08/10  Await final path report    I have discussed this patient's plan of care and discharge plan at IDT rounds today with Case Management, Nursing, Nursing leadership, and other members of the IDT team.    Consultants/Specialty  pulmonary    Code Status  Full  Code    Disposition  Patient is not medically cleared for discharge.   Anticipate discharge to to home with close outpatient follow-up.  I have placed the appropriate orders for post-discharge needs.    Review of Systems  Review of Systems   Constitutional:  Negative for chills and fever.   HENT:  Negative for congestion and sore throat.    Eyes:  Negative for blurred vision and double vision.   Respiratory:  Positive for cough and shortness of breath.    Cardiovascular:  Negative for chest pain and palpitations.   Gastrointestinal:  Negative for abdominal pain, diarrhea, nausea and vomiting.   Genitourinary:  Negative for dysuria and frequency.   Musculoskeletal:  Negative for back pain and falls.   Skin:  Negative for rash.   Neurological:  Negative for seizures and loss of consciousness.   Endo/Heme/Allergies:  Does not bruise/bleed easily.   Psychiatric/Behavioral:  Negative for hallucinations. The patient is not nervous/anxious.       Physical Exam  Temp:  [36.4 °C (97.6 °F)-36.9 °C (98.4 °F)] 36.9 °C (98.4 °F)  Pulse:  [] 84  Resp:  [16-22] 18  BP: (119-134)/() 119/70  SpO2:  [93 %-100 %] 95 %    Physical Exam  Vitals and nursing note reviewed.   Constitutional:       General: She is not in acute distress.     Appearance: She is not toxic-appearing.      Comments: Chronically ill appearing   HENT:      Head: Normocephalic.      Right Ear: External ear normal.      Left Ear: External ear normal.      Nose: No congestion.      Mouth/Throat:      Mouth: Mucous membranes are moist.      Pharynx: No oropharyngeal exudate.   Eyes:      General: No scleral icterus.     Pupils: Pupils are equal, round, and reactive to light.   Cardiovascular:      Rate and Rhythm: Tachycardia present.      Heart sounds: No murmur heard.  Pulmonary:      Breath sounds: No wheezing.   Abdominal:      Palpations: Abdomen is soft.      Tenderness: There is no abdominal tenderness. There is no guarding or rebound.    Musculoskeletal:         General: No swelling or deformity.   Skin:     Coloration: Skin is not jaundiced.      Findings: No bruising.   Neurological:      General: No focal deficit present.      Mental Status: She is alert and oriented to person, place, and time. Mental status is at baseline.      Motor: No weakness.   Psychiatric:         Mood and Affect: Mood normal.         Behavior: Behavior normal.   Patient was seen and examined at bedside. No changes in physical exam from prior evaluation.    Fluids    Intake/Output Summary (Last 24 hours) at 8/12/2022 1416  Last data filed at 8/12/2022 1145  Gross per 24 hour   Intake 960 ml   Output --   Net 960 ml       Laboratory  Recent Labs     08/10/22  0337   WBC 10.6   RBC 4.35   HEMOGLOBIN 14.2   HEMATOCRIT 43.0   MCV 98.9*   MCH 32.6   MCHC 33.0*   RDW 48.2   PLATELETCT 145*   MPV 12.2     Recent Labs     08/10/22  0337   SODIUM 137   POTASSIUM 3.5*   CHLORIDE 101   CO2 25   GLUCOSE 114*   BUN 21   CREATININE 0.77   CALCIUM 8.0*                       Imaging  MR-BRAIN-WITH & W/O   Final Result      1.  There is no evidence of abnormal nodular parenchymal enhancement to suggest any parenchymal metastasis. There is no osseous lesion.   2.  There is an approximately 9 x 9 mm sized extra-axial lesion in the interhemispheric region likely representing an incidental meningioma.      EC-ECHOCARDIOGRAM COMPLETE W/O CONT   Final Result      DX-CHEST-LIMITED (1 VIEW)   Final Result      1.  Negative for pneumothorax      2.  Left lung partial collapse although ipsilateral shift has decreased      3.  Right lung interstitial densities consistent with edema or fibrosis      DX-CHEST-2 VIEWS   Final Result         1.  Patchy left pulmonary infiltrates.   2.  Moderate layering left pleural effusion           Assessment/Plan  * Primary lung squamous cell carcinoma, left (HCC)- (present on admission)  Assessment & Plan  - Long time smoker, hx of endometrial ca  - hilar mass  with compression of the bronchus in the MIGNON with partial obstruction and concurrent pleural effusion  S/p bronch 08/10  Await final path report    Acute respiratory failure with hypoxia (HCC)- (present on admission)  Assessment & Plan  - pt requiring 3L  - No active wheezing on exam but was on steroids at outside facility - continue Prednisone  - Hilar lung mass at outside facility with compression of the left upper lobe bronchus       A-fib (HCC)- (present on admission)  Assessment & Plan  - New onset afib at outside facility  - Now on oral Diltiazem  - Echo with preserved EF  - TSH   CHADSVASC 1 - no indication for antcoagulation  Concern for bleeding of endobronchial lesion as well on anticoagulation    Tobacco abuse  Assessment & Plan  Tobacco cessation counseling and education provided for 4 minutes. Nicotine replacement options provided including patch, and further medical treatments including Wellbutrin and Chantix. As well as over the counter options of lozenges and gum.      COPD with exacerbation  Assessment & Plan  - Continue home Symbicort  - PRN duonebs  - RT consult and flutter valve  - Oral prednisone  - Rocephin/Azithro  - CXR    Alcohol dependence (HCC)- (present on admission)  Assessment & Plan  -6-12 drinks daily  - CIWA       Elevated brain natriuretic peptide (BNP) level- (present on admission)  Assessment & Plan  - Echo   - Does not appear overloaded         VTE prophylaxis: SCDs/TEDs    I have performed a physical exam and reviewed and updated ROS and Plan today (8/12/2022). In review of yesterday's note (8/11/2022), there are no changes except as documented above.

## 2022-08-12 NOTE — CARE PLAN
The patient is Stable - Low risk of patient condition declining or worsening    Shift Goals  Clinical Goals: Transfer to [facility] 8/12  Patient Goals: Transfer tomorrow  Family Goals: [id number]    Progress made toward(s) clinical / shift goals:    Problem: Knowledge Deficit - Standard  Goal: Patient and family/care givers will demonstrate understanding of plan of care, disease process/condition, diagnostic tests and medications  Outcome: Progressing  Note: POC reviewed with patient, all questions answered, no needs at this time.      Problem: Ineffective Airway Clearance  Goal: Patient will maintain patent airway with clear/clearing breath sounds  Outcome: Progressing  Note: Patient sating above 90% on 4 L.        Patient is not progressing towards the following goals:

## 2022-08-12 NOTE — PROGRESS NOTES
Pulmonary Progress Note    Date of admission  8/8/2022    Chief Complaint  62 y.o. female admitted 8/8/2022 with abnormal CT chest.     Hospital Course  62 y.o. female who presented 8/8/2022 with abnormal CT chest.  Patient with significant history of self reported COPD, alcoholism, and depression who presents to [facility] with complaints of abdominal pain. Further workup revealed patient was in A fib w/ RVR. She was started on diltiazem infusion and systemic AC. CTA showed emphysematous changes, left hilar mass obstructing MIGNON and small left pleural effusion. Patient was transferred here to [facility] for further workup. She was scheduled for bronchoscopy/EBUS yesterday but transfer was delayed due to insurance authorization. Pulmonary consulted for further evaluation.     8/10: No acute issues overnight. Bronchoscopy/EBUS w/ biopsy/BAL -> complete occlusion of left mainstem. Biopsy preliminary read came back positive for malignancy pending final path.     8/11: No acute issues overnight. Remains on 3 liters NC. Cough is intermittent and productive in nature Denies hemoptysis, N/V, fever/chills, or abdominal pain. Ordered MRI brain for further staging. Dr. Espinoza from oncology consulted.     8/12: No acute issues overnight. MRI brain showed 9X9 mm lesion with no evidence of metastasis. TTE showed preserved LV function. Confirmed with pathologist final path is positive for squamous cell CA pending LN specimen evaluation. D/w Dr. Guillermo and deemed not a candidate for tumor debulking therapy. D/w Dr. Espinoza and recommended transfer to [facility] for potential inpatient radiation and chemotherapy. Subjectively patient reports feeling about the same. She is anxious to be transfer to [facility] for further treatment evaluation.      Interval Problem Update  Reviewed last 24 hour events:    Review of Systems  Review of Systems   Constitutional:  Negative for chills, fever, malaise/fatigue and weight loss.   HENT: Negative.      Respiratory:  Positive for cough and shortness of breath. Negative for hemoptysis and sputum production.    Cardiovascular:  Negative for chest pain, palpitations, orthopnea, claudication and leg swelling.   Gastrointestinal: Negative.    Genitourinary: Negative.    Musculoskeletal: Negative.    Skin: Negative.    All other systems reviewed and are negative.     Vital Signs for last 24 hours   Temp:  [36.4 °C (97.6 °F)-36.9 °C (98.4 °F)] 36.9 °C (98.4 °F)  Pulse:  [] 76  Resp:  [16-22] 20  BP: (125-134)/() 128/72  SpO2:  [93 %-100 %] 95 %    Hemodynamic parameters for last 24 hours       Respiratory Information for the last 24 hours       Physical Exam   Physical Exam  Constitutional:       Appearance: She is obese.   HENT:      Head: Normocephalic.      Nose: Nose normal.   Eyes:      Pupils: Pupils are equal, round, and reactive to light.   Cardiovascular:      Rate and Rhythm: Normal rate and regular rhythm.      Pulses: Normal pulses.   Pulmonary:      Effort: Pulmonary effort is normal.      Comments: Decrease BS right base     Abdominal:      General: Abdomen is flat. Bowel sounds are normal. There is no distension.      Palpations: Abdomen is soft.   Musculoskeletal:         General: Normal range of motion.      Cervical back: Normal range of motion.      Right lower leg: No edema.      Left lower leg: No edema.   Skin:     General: Skin is warm.   Neurological:      Mental Status: She is alert and oriented to person, place, and time.      Cranial Nerves: No cranial nerve deficit.   Psychiatric:         Mood and Affect: Mood normal.       Medications  Current Facility-Administered Medications   Medication Dose Route Frequency Provider Last Rate Last Admin    DILTIAZem CD (CARDIZEM CD) capsule 300 mg  300 mg Oral Q DAY Jovanni Reed D.O.   300 mg at 08/12/22 0516    rivaroxaban (XARELTO) tablet 20 mg  20 mg Oral PM MEAL Bonnie Boucher M.D.   20 mg at 08/11/22 5610    Respiratory Therapy Consult    Nebulization Continuous RT Melony Rhodes M.D.        ipratropium-albuterol (DUONEB) nebulizer solution  3 mL Nebulization Q2HRS PRN (RT) Melony Rhodes M.D.   3 mL at 08/12/22 0706    senna-docusate (PERICOLACE or SENOKOT S) 8.6-50 MG per tablet 2 Tablet  2 Tablet Oral BID Melony Rhodes M.D.   2 Tablet at 08/09/22 1800    And    polyethylene glycol/lytes (MIRALAX) PACKET 1 Packet  1 Packet Oral QDAY PRN Melony Rhdoes M.D.        And    magnesium hydroxide (MILK OF MAGNESIA) suspension 30 mL  30 mL Oral QDAY PRN Melony Rhodes M.D.        And    bisacodyl (DULCOLAX) suppository 10 mg  10 mg Rectal QDAY PRN Melony Rhodes M.D.        acetaminophen (Tylenol) tablet 650 mg  650 mg Oral Q6HRS PRN Melony Rhodes M.D.   650 mg at 08/10/22 1948    ondansetron (ZOFRAN) syringe/vial injection 4 mg  4 mg Intravenous Q4HRS PRN Melony Rhodes M.D.        ondansetron (ZOFRAN ODT) dispertab 4 mg  4 mg Oral Q4HRS PRN Melony Rhodes M.D.        promethazine (PHENERGAN) tablet 12.5-25 mg  12.5-25 mg Oral Q4HRS PRN Melony Rhodes M.D.        promethazine (PHENERGAN) suppository 12.5-25 mg  12.5-25 mg Rectal Q4HRS PRN Melony Rhodes M.D.        prochlorperazine (COMPAZINE) injection 5-10 mg  5-10 mg Intravenous Q4HRS PRN Melony Rhodes M.D.        labetalol (NORMODYNE/TRANDATE) injection 10 mg  10 mg Intravenous Q4HRS PRN Melony Rhodes M.D.        LORazepam (ATIVAN) tablet 0.5 mg  0.5 mg Oral Q4HRS PRN Melony Rhodes M.D.        LORazepam (ATIVAN) tablet 1 mg  1 mg Oral Q4HRS PRN Melony Rhodes M.D.        LORazepam (ATIVAN) tablet 2 mg  2 mg Oral Q2HRS PRN Melony Rhodes M.D.        LORazepam (ATIVAN) tablet 3 mg  3 mg Oral Q HOUR PRN Melony Rhodes M.D.        LORazepam (ATIVAN) tablet 4 mg  4 mg Oral Q15 MIN PRN Melony Rhodes M.D.        Or    LORazepam (ATIVAN) injection 2 mg  2 mg Intravenous Q15 MIN PRN Melony Rhodes M.D.        guaiFENesin dextromethorphan (ROBITUSSIN DM) 100-10 MG/5ML syrup 10 mL  10 mL  Oral Q6HRS PRN Melony Rhodes M.D.        insulin regular (HumuLIN R,NovoLIN R) injection  1-6 Units Subcutaneous Q6HRS Melony Rhodes M.D.   1 Units at 08/10/22 2310    And    dextrose 10 % BOLUS 25 g  25 g Intravenous Q15 MIN PRN Melony Rhodes M.D.        nicotine (NICODERM) 21 MG/24HR 21 mg  21 mg Transdermal Daily-0600 Melony Rhodes M.D.   21 mg at 08/12/22 0517    And    nicotine polacrilex (NICORETTE) 2 MG piece 2 mg  2 mg Oral Q HOUR PRN Melony Rhodes M.D.        predniSONE (DELTASONE) tablet 40 mg  40 mg Oral DAILY Melony Rhodes M.D.   40 mg at 08/12/22 0517    cefTRIAXone (Rocephin) 2 g in  mL IVPB  2 g Intravenous Q EVENING Melony Rhodes M.D.   Stopped at 08/11/22 1804    budesonide-formoterol (SYMBICORT) 80-4.5 MCG/ACT inhaler 2 Puff  2 Puff Inhalation BID (RT) Melony Rhodes M.D.   2 Puff at 08/12/22 0706    traZODone (DESYREL) tablet 50 mg  50 mg Oral Nightly Jonnie Middleton M.D.   50 mg at 08/11/22 2005       Fluids    Intake/Output Summary (Last 24 hours) at 8/12/2022 1135  Last data filed at 8/12/2022 0800  Gross per 24 hour   Intake 720 ml   Output --   Net 720 ml         Laboratory          Recent Labs     08/10/22  0337   SODIUM 137   POTASSIUM 3.5*   CHLORIDE 101   CO2 25   BUN 21   CREATININE 0.77   MAGNESIUM 2.3   PHOSPHORUS 2.8   CALCIUM 8.0*       Recent Labs     08/10/22  0337   GLUCOSE 114*       Recent Labs     08/10/22  0337   WBC 10.6   NEUTSPOLYS 79.50*   LYMPHOCYTES 6.30*   MONOCYTES 12.70   EOSINOPHILS 0.00   BASOPHILS 0.20       Recent Labs     08/10/22  0337   RBC 4.35   HEMOGLOBIN 14.2   HEMATOCRIT 43.0   PLATELETCT 145*         Imaging  X-Ray:  No film today    Assessment/Plan  * Primary lung squamous cell carcinoma, left (HCC)- (present on admission)  Assessment & Plan  -- 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 Espinoza and [place] [facility] [place] vs [place]   -- Path positive for squamous cell carcinoma pending final LN path   -- Given there is complete occlusion of left mainstem and unable to pass bronchoscope, patient would be at high risk for airway perforation from tumor debulking therapy and would not be a candidate at this time. Case d/w Dr. Guillermo whom is also in agreement with it.        COPD with exacerbation  Assessment & Plan  -- Needs outpatient PFTs to confirm airflow obstruction   -- Smoking cessation advised   -- Cont prednisone 40 mg X 5 days   -- On symbicort       Acute respiratory failure with hypoxia (HCC)- (present on admission)  Assessment & Plan  -- Secondary to lung mass w/ collapse and post obstructive PNA   -- On CAP coverage   -- Wash cx negative to date   -- Cont CAP coverage X 7 days   -- Biopsy came back positive for squamous cell CA pending final LN path    -- HOB elevation   -- Aspiration precaution   -- Goal SpO2 > 88%   -- Check 6 minute walk test to assess for home O2 therapy          VTE:  NOAC  Ulcer: Not Indicated  Lines: None    I have performed a physical exam and reviewed and updated ROS and Plan today (8/12/2022). In review of yesterday's note (8/11/2022), there are no changes except as documented above.     Discussed patient condition and risk of morbidity and/or mortality with Hospitalist, Family, RN, RT, and Patient

## 2022-08-12 NOTE — DISCHARGE PLANNING
Case Management Discharge Planning    Admission Date: 8/8/2022  GMLOS: 4.9  ALOS: 4    6-Clicks ADL Score: 24  6-Clicks Mobility Score: 22      Anticipated Discharge Dispo: Discharge Disposition: Discharged to home/self care (01)  Discharge Address: [address] [place] [address] [place] [address]    DME Needed: No    Action(s) Taken: Updated Provider/Nurse on Discharge Plan    Anticipate transfer to [facility].     Escalations Completed: RTOC    Medically Clear: No    Next Steps: Transfer to [facility]     Barriers to Discharge: Medical clearance

## 2022-08-12 NOTE — PROGRESS NOTES
Telemetry Shift Summary     Rhythm: Afib with 4bts of Vt and 5 bts VT   HR:   Ectopy: oBig, r-oTrig, r-oCoup, fTrip    Measurements: -/0.08/-    Normal Values  Rhythm: SR  HR:   Measurements: 0.12-0.20/0.08-0.10/0.30-0.52

## 2022-08-13 PROBLEM — F17.203 NICOTINE DEPENDENCE WITH WITHDRAWAL: Status: ACTIVE | Noted: 2022-01-01

## 2022-08-13 PROBLEM — C34.90 LUNG CANCER (HCC): Status: ACTIVE | Noted: 2022-01-01

## 2022-08-13 NOTE — PROGRESS NOTES
Hospital Medicine Daily Progress Note    Date of Service  8/13/2022    Chief Complaint  Geena Richey is a 62 y.o. female admitted 8/12/2022 with complete occlusion of left main    Hospital Course  No notes on file    Interval Problem Update  No acute overnight events, patient irritable as she has not had food since her transfer from [facility].  She also appears to be going through some degree of nicotine withdrawal, nicotine patches and gums ordered.  Patient was transferred for consideration of radiation therapy to treat her occluded left main stem bronchus secondary to a left hilar mass.  Pathology pending.  On-call radiation oncologist will be seeing this patient on Monday.  Medical oncologist is aware patient is here.    I have discussed this patient's plan of care and discharge plan at IDT rounds today with Case Management, Nursing, Nursing leadership, and other members of the IDT team.    Consultants/Specialty  Dr. Strickland, radiation oncology  Dr. Espinoza, medical oncology    Code Status  Full Code    Disposition  Pending    Review of Systems  Patient irritable, has not had food for some time.  Otherwise all systems reviewed and negative    Physical Exam  Temp:  [36.5 °C (97.7 °F)-37.2 °C (98.9 °F)] 36.5 °C (97.7 °F)  Pulse:  [] 106  Resp:  [16-18] 16  BP: (120-141)/(78-93) 141/88  SpO2:  [92 %-98 %] 95 %    General: No acute distress  HEENT atraumatic, normocephalic, pupils equal round reactive to light  Neck: No JVD  Chest: Respirations are unlabored  Cardiac: Physiologic S1 and S2  Abdomen: Soft, nontender, nondistended  Extremities: Without clubbing, cyanosis or edema  Neuro: Cranial nerves II through XII are grossly intact.  Psych: No anxiety, judgement intact.        Current Facility-Administered Medications:     nicotine (NICODERM) 21 MG/24HR 21 mg, 21 mg, Transdermal, Daily-0600, 21 mg at 08/13/22 1021 **AND** Nicotine Replacement Patient Education Materials, , , Once **AND**  nicotine polacrilex (NICORETTE) 2 MG piece 2 mg, 2 mg, Oral, Q HOUR PRN, Claudio Umaña M.D.    [START ON 8/14/2022] DILTIAZem CD (CARDIZEM CD) capsule 360 mg, 360 mg, Oral, DAILY, Claudio Umaña M.D.    acetaminophen (Tylenol) tablet 650 mg, 650 mg, Oral, Q6HRS PRN, Jovanni Reed D.O.    budesonide-formoterol (SYMBICORT) 80-4.5 MCG/ACT inhaler 2 Puff, 2 Puff, Inhalation, BID (RT), Jovanni Reed D.O., 2 Puff at 08/13/22 0635    cefTRIAXone (Rocephin) syringe 2 g, 2 g, Intravenous, Q EVENING, Jovanni Reed D.O.    guaiFENesin dextromethorphan (ROBITUSSIN DM) 100-10 MG/5ML syrup 10 mL, 10 mL, Oral, Q6HRS PRN, Jovanni Reed D.O.    insulin regular (HumuLIN R,NovoLIN R) injection, 1-6 Units, Subcutaneous, Q6HRS **AND** POC blood glucose manual result, , , Q6H **AND** NOTIFY MD and PharmD, , , Once **AND** Administer 20 grams of glucose (approximately 8 ounces of fruit juice) every 15 minutes PRN FSBG less than 70 mg/dL, , , PRN **AND** dextrose 10 % BOLUS 25 g, 25 g, Intravenous, Q15 MIN PRN, Jovanni Reed D.O.    ipratropium-albuterol (DUONEB) nebulizer solution, 3 mL, Nebulization, Q2HRS PRN (RT), Jovanni Reed D.O.    labetalol (NORMODYNE/TRANDATE) injection 10 mg, 10 mg, Intravenous, Q4HRS PRN, Jovanni Reed D.O.    ondansetron (ZOFRAN ODT) dispertab 4 mg, 4 mg, Oral, Q4HRS PRN, Jovanni Reed D.O.    ondansetron (ZOFRAN) syringe/vial injection 4 mg, 4 mg, Intravenous, Q4HRS PRN, Jovanni Reed D.O.    prochlorperazine (COMPAZINE) injection 5-10 mg, 5-10 mg, Intravenous, Q4HRS PRN, Jovanni Reed D.O.    promethazine (PHENERGAN) suppository 12.5-25 mg, 12.5-25 mg, Rectal, Q4HRS PRN, Jovanni Reed D.O.    promethazine (PHENERGAN) tablet 12.5-25 mg, 12.5-25 mg, Oral, Q4HRS PRN, Jovanni Reed D.O.    Respiratory Therapy Consult, , Nebulization, Continuous RT, Jovanni Reed D.O.    senna-docusate (PERICOLACE or SENOKOT S) 8.6-50 MG per tablet 2 Tablet, 2 Tablet, Oral, BID **AND** polyethylene  glycol/lytes (MIRALAX) PACKET 1 Packet, 1 Packet, Oral, QDAY PRN **AND** magnesium hydroxide (MILK OF MAGNESIA) suspension 30 mL, 30 mL, Oral, QDAY PRN **AND** bisacodyl (DULCOLAX) suppository 10 mg, 10 mg, Rectal, QDAY PRN, AMINATA Herring.O.    traZODone (DESYREL) tablet 50 mg, 50 mg, Oral, Nightly, Jovanni Reed D.O.      Fluids    Intake/Output Summary (Last 24 hours) at 8/13/2022 1406  Last data filed at 8/12/2022 2200  Gross per 24 hour   Intake 240 ml   Output --   Net 240 ml       Laboratory  Recent Labs     08/12/22 2216   WBC 12.5*   RBC 4.57   HEMOGLOBIN 15.1   HEMATOCRIT 44.6   MCV 97.6   MCH 33.0   MCHC 33.9   RDW 47.6   PLATELETCT 157*   MPV 11.4     Recent Labs     08/12/22 2216   SODIUM 135   POTASSIUM 4.3   CHLORIDE 101   CO2 23   GLUCOSE 114*   BUN 24*   CREATININE 0.86   CALCIUM 8.2*                   Imaging  No orders to display    8/11/2022 3:03 PM     HISTORY/REASON FOR EXAM:  Non-small cell lung cancer, staging; NSCLC staging.        TECHNIQUE/EXAM DESCRIPTION:   MRI of the brain without and with contrast.     T1 sagittal, T2 fast spin-echo axial, T1 coronal, FLAIR coronal, diffusion-weighted and apparent diffusion coefficient (ADC map) axial images were obtained of the whole brain. T1 postcontrast axial and T1 postcontrast coronal images were obtained.     The study was performed on a [email or web address] Signa 1.5 Silvia MRI scanner.     20 mL ProHance contrast was administered intravenously.     COMPARISON:  None.     FINDINGS:     There is no abnormal nodular parenchymal contrast enhancement. There is no acute infarct. There is no acute or chronic parenchymal hemorrhage. A few nonspecific T2 hyperintensities are noted in the subcortical and periventricular white   matter. Mild cerebral volume loss is seen. There is an approximately 9 x 9 mm sized extra-axial lesion along the sides of the anterior falx cerebri.  There is no large lesion identified in the expected course of the intracranial portions  of the cranial   nerves. The skull bones are unremarkable. The paranasal sinuses are clear. The extracranial soft tissue including orbits appear grossly normal.           IMPRESSION:     1.  There is no evidence of abnormal nodular parenchymal enhancement to suggest any parenchymal metastasis. There is no osseous lesion.  2.  There is an approximately 9 x 9 mm sized extra-axial lesion in the interhemispheric region likely representing an incidental meningioma.    Assessment/Plan  Nicotine dependence with withdrawal  Assessment & Plan  Replacement patches and gums per my orders.    Endobronchial mass- (present on admission)  Assessment & Plan  8/10 Bronchoscopy/EBUS w/ biopsy/BAL -> complete occlusion of left mainstem.   Biopsy preliminary read positive for malignancy pending final path.       Alcohol dependence (HCC)- (present on admission)  Assessment & Plan  CIWA scores itself medical have been 0, CIWA scores on arrival here 0, no need for continued CIWA protocol    A-fib (HCC)- (present on admission)  Assessment & Plan  New onset at OSH, now on oral diltiazem and rate controlled.  Echo with preserved EF  [id number] 1 no indication for anticoagulation additionally endobronchial lesion raises concern for bleeding with anticoagulation.    COPD (chronic obstructive pulmonary disease) (HCC)- (present on admission)  Assessment & Plan  She was treated for COPD exacerbation at OSH in [facility], she had 1 more dose of prednisone to complete which I have ordered.  RT consult, continuous pulse ox, incentive spirometry, DuoNebs as needed.  No longer with any wheeze.  Continue DuoNeb, Symbicort    Primary lung squamous cell carcinoma, left (HCC)- (present on admission)  Assessment & Plan  CT shows Hilar mass with compression of bronchus and left upper lobe with partial obstruction and concurrent pleural effusion, status post bronc on 8/10/22 awaiting final path report.  Additionally with endobronchial mass concerning  for cancer.  Transferred to [facility] for possible radiation therapy.  Dr. Strickland will be by to see the patient on Monday.      Acute respiratory failure with hypoxia (HCC)- (present on admission)  Assessment & Plan  Secondary to lung mass with collapse and postobstructive pneumonia, on CAP coverage continuing on ceftriaxone.  BAL cultures negative to date  Will complete 7 days of CAP coverage.  Aspiration precaution.

## 2022-08-13 NOTE — H&P
Hospital Medicine History & Physical Note    Date of Service  8/12/2022    Primary Care Physician  Pcp Pt States None    Consultants  None    Code Status  Full Code    Chief Complaint  Dyspnea      History of Presenting Illness  Geena Richey is a 62 y.o. female history of alcohol abuse, history of endometrial cancer, COPD, chronic pain, depression, overactive bladder who presented 8/12/2022 with as a transfer from [facility] for consultation with radiation oncology.  Patient presented to OSH on 8/8/2022 with abdominal pain, nausea, emesis.  She was found to be in new onset A. fib and was started on a diltiazem drip.  CTA chest obtained and showed left hilar mass with compression of the bronchus and left upper lobe with partial obstruction and concurrent pleural effusion, no PE.  She was on 3 L nasal cannula at the initial facility, dill drip transition to oral diltiazem.  She was transferred to [facility] and pulmonology was consulted.  She was started on antibiotics, treated for COPD exacerbation, ultimately underwent bronchoscopy with pulmonary that identified endobronchial lesion with complete occlusion of left mainstem.  Biopsy came back with primary lung squamous cell carcinoma.  Was felt to not to be a candidate for endobronchial tumor debulking therapy and therefore case was discussed with Dr. Espinoza from radiation oncology who agrees with transfer to [facility] for consultation and possible radiation debulking.    When she arrived to [facility] she was in good spirits, no acute distress, she is afebrile heart rate is ranged from  respiratory rate is ranged from 18-22 systolic blood pressure 119-138 she is  92 to 98% oxygen saturation on 3 L nasal cannula.  Labs obtained showed mild leukocytosis 12.5, BUN 24 serum creatinine 0.86 proBNP 2377.  She has no acute complaints and is motivated to start treatment.    I discussed the plan of care with patient, bedside RN, and  pharmacy.    Review of Systems  Review of Systems   Constitutional:  Positive for malaise/fatigue. Negative for chills and fever.   HENT:  Negative for congestion and sinus pain.    Eyes:  Negative for blurred vision and photophobia.   Respiratory:  Positive for cough and shortness of breath.    Cardiovascular:  Negative for chest pain and palpitations.   Gastrointestinal:  Positive for abdominal pain, nausea and vomiting.   Genitourinary:  Negative for dysuria and urgency.   Musculoskeletal:  Negative for falls and myalgias.   Neurological:  Negative for sensory change, speech change and focal weakness.   Psychiatric/Behavioral:  Positive for substance abuse. The patient is not nervous/anxious.      Past Medical History   has a past medical history of Arthritis, Cancer (HCC) (2014), Cholesterol blood decreased, and Other specified symptom associated with female genital organs.    Surgical History   has a past surgical history that includes gyn surgery (1980's); dilation and curettage (8/14/2014); hysterectomy robotic xi (9/25/2014); lymph node dissection robotic xi (9/25/2014); omentectomy (9/25/2014); pr bronchoscopy,diagnostic (N/A, 8/10/2022); and endobronchial us add-on (N/A, 8/10/2022).     Family History  family history is not on file.   Family history reviewed with patient. There is no family history that is pertinent to the chief complaint.     Social History   reports that she has been smoking cigarettes. She has a 17.50 pack-year smoking history. She does not have any smokeless tobacco history on file. She reports current alcohol use. She reports that she does not use drugs.    Allergies  No Known Allergies    Medications  Prior to Admission Medications   Prescriptions Last Dose Informant Patient Reported? Taking?   DILTIAZem CD (CARDIZEM CD) 300 MG CAPSULE SR 24 HR   No No   Sig: Take 1 Capsule by mouth every day for 30 days.   albuterol 108 (90 Base) MCG/ACT Aero Soln inhalation aerosol   No No   Sig:  Inhale 2 Puffs every four hours as needed for Shortness of Breath for up to 30 days.   amoxicillin-clavulanate (AUGMENTIN) 875-125 MG Tab   No No   Sig: Take 1 Tablet by mouth 2 times a day for 5 days.   budesonide-formoterol (SYMBICORT) 80-4.5 MCG/ACT Aerosol   No No   Sig: Inhale 2 Puffs by mouth 2 times a day.   doxycycline (MONODOX) 100 MG capsule   No No   Sig: Take 1 Capsule by mouth 2 times a day for 5 days.   oxycodone-acetaminophen (PERCOCET) 7.5-325 MG per tablet   No No   Sig: Take 1-2 Tabs by mouth every 6 hours as needed (pain).   predniSONE (DELTASONE) 20 MG Tab   No No   Sig: Take 1 tablet by mouth daily   trazodone (DESYREL) 50 MG TABS   Yes No   Sig: Take 50 mg by mouth every evening.      Facility-Administered Medications: None       Physical Exam  Temp:  [36.6 °C (97.8 °F)-37.2 °C (98.9 °F)] 36.9 °C (98.4 °F)  Pulse:  [] 82  Resp:  [18-22] 18  BP: (119-136)/() 125/87  SpO2:  [92 %-98 %] 95 %  Blood Pressure: 125/87   Temperature: 36.9 °C (98.4 °F)   Pulse: 82   Respiration: 18   Pulse Oximetry: 95 %       Physical Exam  Vitals and nursing note reviewed.   Constitutional:       General: She is not in acute distress.     Appearance: She is not toxic-appearing.      Comments: 62-year-old female appears older than her stated age alert and conversant able to speak full sentences without becoming breathless no acute distress nontoxic-appearing   HENT:      Head: Normocephalic and atraumatic.      Nose: Nose normal. No rhinorrhea.      Mouth/Throat:      Mouth: Mucous membranes are moist.      Pharynx: Oropharynx is clear.   Eyes:      General: No scleral icterus.     Extraocular Movements: Extraocular movements intact.      Conjunctiva/sclera: Conjunctivae normal.      Pupils: Pupils are equal, round, and reactive to light.   Cardiovascular:      Rate and Rhythm: Normal rate. Rhythm irregular.      Pulses: Normal pulses.   Pulmonary:      Effort: Pulmonary effort is normal. No respiratory  distress.      Breath sounds: Rales present. No wheezing or rhonchi.      Comments: Decreased breath sounds in the left middle and lower lung fields with some rales, no rhonchi or wheeze  Abdominal:      General: Bowel sounds are normal.      Palpations: Abdomen is soft.      Tenderness: There is no abdominal tenderness. There is no guarding or rebound.   Musculoskeletal:         General: No tenderness or deformity. Normal range of motion.      Cervical back: Normal range of motion and neck supple. No rigidity or tenderness.   Skin:     General: Skin is warm and dry.      Capillary Refill: Capillary refill takes less than 2 seconds.   Neurological:      General: No focal deficit present.      Mental Status: She is alert and oriented to person, place, and time. Mental status is at baseline.      Cranial Nerves: No cranial nerve deficit.      Sensory: No sensory deficit.      Motor: No weakness.      Coordination: Coordination normal.   Psychiatric:         Mood and Affect: Mood normal.         Behavior: Behavior normal.         Thought Content: Thought content normal.         Judgment: Judgment normal.       Laboratory:  Recent Labs     08/12/22 2216   WBC 12.5*   RBC 4.57   HEMOGLOBIN 15.1   HEMATOCRIT 44.6   MCV 97.6   MCH 33.0   MCHC 33.9   RDW 47.6   PLATELETCT 157*   MPV 11.4     Recent Labs     08/12/22 2216   SODIUM 135   POTASSIUM 4.3   CHLORIDE 101   CO2 23   GLUCOSE 114*   BUN 24*   CREATININE 0.86   CALCIUM 8.2*     Recent Labs     08/12/22 2216   ALTSGPT 26   ASTSGOT 14   ALKPHOSPHAT 38   TBILIRUBIN 0.3   GLUCOSE 114*         Recent Labs     08/12/22 2216   NTPROBNP 2377*         No results for input(s): TROPONINT in the last 72 hours.    Imaging:  No orders to display         Assessment/Plan:  Justification for Admission Status  I anticipate this patient will require at least two midnights for appropriate medical management, necessitating inpatient admission because acute respiratory failure with  endobronchial mass lesion requiring treatment    Endobronchial mass- (present on admission)  Assessment & Plan  8/10 Bronchoscopy/EBUS w/ biopsy/BAL -> complete occlusion of left mainstem.   Biopsy preliminary read positive for malignancy pending final path.       Alcohol dependence (HCC)- (present on admission)  Assessment & Plan  CIWA scores itself medical have been 0, CIWA scores on arrival here 0, no need for continued CIWA protocol    A-fib (HCC)- (present on admission)  Assessment & Plan  New onset at OSH, now on oral diltiazem and rate controlled.  Echo with preserved EF  [id number] 1 no indication for anticoagulation additionally endobronchial lesion raises concern for bleeding with anticoagulation.    COPD (chronic obstructive pulmonary disease) ([facility])- (present on admission)  Assessment & Plan  She was treated for COPD exacerbation at OSH in [facility], she had 1 more dose of prednisone to complete which I have ordered.  RT consult, continuous pulse ox, incentive spirometry, DuoNebs as needed.  No longer with any wheeze.  Continue DuoNeb, Symbicort    Primary lung squamous cell carcinoma, left (HCC)- (present on admission)  Assessment & Plan  CT shows Hilar mass with compression of bronchus and left upper lobe with partial obstruction and concurrent pleural effusion, status post bronc on 8/10/22 awaiting final path report.  Additionally with endobronchial mass concerning for cancer.  Transferred to [facility] for consultation with Dr. Espinoza for possible radiation debulking.      Acute respiratory failure with hypoxia (HCC)- (present on admission)  Assessment & Plan  Secondary to lung mass with collapse and postobstructive pneumonia, on CAP coverage continuing on ceftriaxone.  BAL cultures negative to date  Will complete 7 days of CAP coverage.  Aspiration precaution.          VTE prophylaxis: SCDs/TEDs

## 2022-08-13 NOTE — ASSESSMENT & PLAN NOTE
Secondary to lung mass with collapse and postobstructive pneumonia  BAL cultures negative to date  Completed 7 days of CAP coverage.  Aspiration precautions  RT consult

## 2022-08-13 NOTE — ASSESSMENT & PLAN NOTE
8/10 Bronchoscopy with EBUS and BAL, demonstrated complete occlusion of left mainstem bronchus  Biopsy preliminary results were positive for malignancy  Final pathology demonstrated SCC - see separate plan

## 2022-08-13 NOTE — ASSESSMENT & PLAN NOTE
Continue diltiazem for rate control  LMWH for VTE prophylaxis for now, transition to DOAC if no further procedures

## 2022-08-13 NOTE — ASSESSMENT & PLAN NOTE
CT shows Hilar mass with compression of bronchus and left upper lobe with partial obstruction and concurrent pleural effusion, status post bronc on 8/10/22 awaiting final path report.  Bronchoscopy demonstrated endobronchial mass concerning for cancer invasive lung CA  Pathology demonstrates lung SCC  Transferred to [facility] for possible radiation therapy    Radiation Oncology consulted, awaiting final pathology results  Oncology consulted, awaiting final pathology results

## 2022-08-13 NOTE — ASSESSMENT & PLAN NOTE
Hilar mass with compression of bronchus and left upper lobe with partial obstruction and concurrent pleural effusion, status post bronc on 8/10/22 awaiting final path report.  Additionally with endobronchial mass concerning for cancer.  Transferred to [facility] for consultation with Dr. Espinoza for possible radiation debulking.

## 2022-08-13 NOTE — PROGRESS NOTES
Pt arrived to unit via gurney with [facility] at 2125. Pt oriented to room, unit, and plan of care. Tele-monitor placed and monitor room notified, a fib 135. All questions answered at this time. Call light within reach, fall precautions in place. Direct admit MD paged to bedside.

## 2022-08-13 NOTE — ASSESSMENT & PLAN NOTE
She was treated for COPD exacerbation at OSH in [facility], she had 1 more dose of prednisone to complete which I have ordered.  RT consult, continuous pulse ox, incentive spirometry, DuoNebs as needed.  No longer with any wheeze.  Continue DuoNeb, Symbicort

## 2022-08-13 NOTE — ASSESSMENT & PLAN NOTE
Without evidence of withdrawal at [facility] prior to transfer  Unlikely to be a candidate for naltrexone if requiring opiates for cancer-related pain

## 2022-08-13 NOTE — PROGRESS NOTES
4 Eyes Skin Assessment Completed by HATTIE Newell and HATTIE Mello.    Head WDL  Ears WDL  Nose WDL  Mouth WDL  Neck WDL  Breast/Chest WDL  Shoulder Blades WDL  Spine WDL  (R) Arm/Elbow/Hand WDL  (L) Arm/Elbow/Hand WDL  Abdomen WDL  Groin WDL  Scrotum/Coccyx/Buttocks WDL  (R) Leg Bruising  (L) Leg Bruising  (R) Heel/Foot/Toe Port wine birth ruben  (L) Heel/Foot/Toe WDL          Devices In Places Tele Box, Blood Pressure Cuff, Pulse Ox, and Nasal Cannula      Interventions In Place Pillows and Pressure Redistribution Mattress    Possible Skin Injury No    Pictures Uploaded Into Epic N/A  Wound Consult Placed N/A  RN Wound Prevention Protocol Ordered No

## 2022-08-13 NOTE — CARE PLAN
The patient is Stable - Low risk of patient condition declining or worsening    Shift Goals  Clinical Goals: Obtain labs, monitor O2  Patient Goals: Sleep    Progress made toward(s) clinical / shift goals:  Labs ordered and obtained on admit. Oxygen saturations 90-92% on 2L NC. Patient requires 4L with ambulation.     Problem: Knowledge Deficit - Standard  Goal: Patient and family/care givers will demonstrate understanding of plan of care, disease process/condition, diagnostic tests and medications  Outcome: Progressing  Note: Patient oriented to unit and room, RN discussed POC for the night.      Problem: Impaired Gas Exchange  Goal: Patient will demonstrate improved ventilation and adequate oxygenation and participate in treatment regimen within the level of ability/situation.  Outcome: Progressing  Note: Patient requiring 2L oxygen NC at rest and 4L during ambulation. Saturations remaining 90-92%. Respiratory medications ordered and available per MAR.         Patient is not progressing towards the following goals:

## 2022-08-14 NOTE — PROGRESS NOTES
[facility] Medicine Daily Progress Note    Date of Service  8/14/2022    Chief Complaint  Geena Richey is a 62 y.o. female admitted 8/12/2022 with complete occlusion of left main    Hospital Course  No notes on file    Interval Problem Update  No acute overnight events, patient denies any dyspnea or cough at this time.  Mood is euthymic.  No chest pain.      I have discussed this patient's plan of care and discharge plan at IDT rounds today with Case Management, Nursing, Nursing leadership, and other members of the IDT team.    Consultants/Specialty  Dr. Newell, radiation oncology  Dr. Espinoza, medical oncology    Code Status  Full Code    Disposition  Pending    Review of Systems  All systems reviewed and negative except as noted per above    Physical Exam  Temp:  [36.5 °C (97.7 °F)-36.8 °C (98.2 °F)] 36.8 °C (98.2 °F)  Pulse:  [] 80  Resp:  [16-18] 16  BP: (111-137)/(76-98) 111/77  SpO2:  [93 %-95 %] 95 %    General: No acute distress  HEENT atraumatic, normocephalic, pupils equal round reactive to light  Neck: No JVD  Chest: Respirations are unlabored  Cardiac: Physiologic S1 and S2  Abdomen: Soft, nontender, nondistended  Extremities: Without clubbing, cyanosis or edema  Neuro: Cranial nerves II through XII are grossly intact.  Psych: No anxiety, judgement intact.        Current Facility-Administered Medications:     insulin regular (HumuLIN R,NovoLIN R) injection, 1-6 Units, Subcutaneous, 4X/DAY ACHS **AND** POC blood glucose manual result, , , Q AC AND BEDTIME(S) **AND** NOTIFY MD and PharmD, , , Once **AND** Administer 20 grams of glucose (approximately 8 ounces of fruit juice) every 15 minutes PRN FSBG less than 70 mg/dL, , , PRN **AND** dextrose 10 % BOLUS 25 g, 25 g, Intravenous, Q15 MIN PRN, Claudio Umaña M.D.    nicotine (NICODERM) 21 MG/24HR 21 mg, 21 mg, Transdermal, Daily-0600, 21 mg at 08/14/22 0555 **AND** Nicotine Replacement Patient Education Materials, , , Once **AND** nicotine  polacrilex (NICORETTE) 2 MG piece 2 mg, 2 mg, Oral, Q HOUR PRN, Claudio Umaña M.D.    DILTIAZem CD (CARDIZEM CD) capsule 360 mg, 360 mg, Oral, DAILY, Claudio Umaña M.D., 360 mg at 08/14/22 0556    enoxaparin (Lovenox) inj 40 mg, 40 mg, Subcutaneous, DAILY AT 1800, Claudio Umaña M.D., 40 mg at 08/13/22 1847    acetaminophen (Tylenol) tablet 650 mg, 650 mg, Oral, Q6HRS PRN, BLAIR HerringOrBianda, 650 mg at 08/14/22 0956    budesonide-formoterol (SYMBICORT) 80-4.5 MCG/ACT inhaler 2 Puff, 2 Puff, Inhalation, BID (RT), Jovanni Reed D.O., 2 Puff at 08/14/22 1032    cefTRIAXone (Rocephin) syringe 2 g, 2 g, Intravenous, Q EVENING, Claudio Umaña M.D., 2 g at 08/13/22 1847    guaiFENesin dextromethorphan (ROBITUSSIN DM) 100-10 MG/5ML syrup 10 mL, 10 mL, Oral, Q6HRS PRN, Jovanni Reed D.O.    ipratropium-albuterol (DUONEB) nebulizer solution, 3 mL, Nebulization, Q2HRS PRN (RT), Jovanni Reed D.O.    labetalol (NORMODYNE/TRANDATE) injection 10 mg, 10 mg, Intravenous, Q4HRS PRN, Jovanni Reed D.O.    ondansetron (ZOFRAN ODT) dispertab 4 mg, 4 mg, Oral, Q4HRS PRN, Jovanni Reed D.O.    ondansetron (ZOFRAN) syringe/vial injection 4 mg, 4 mg, Intravenous, Q4HRS PRN, Jovanni Reed D.O.    prochlorperazine (COMPAZINE) injection 5-10 mg, 5-10 mg, Intravenous, Q4HRS PRN, Jovanni Reed D.O.    promethazine (PHENERGAN) suppository 12.5-25 mg, 12.5-25 mg, Rectal, Q4HRS PRN, Jovanni Reed D.O.    promethazine (PHENERGAN) tablet 12.5-25 mg, 12.5-25 mg, Oral, Q4HRS PRN, Jovanni Reed D.O.    Respiratory Therapy Consult, , Nebulization, Continuous RT, Jovnani Reed D.O.    senna-docusate (PERICOLACE or SENOKOT S) 8.6-50 MG per tablet 2 Tablet, 2 Tablet, Oral, BID, 2 Tablet at 08/13/22 1847 **AND** polyethylene glycol/lytes (MIRALAX) PACKET 1 Packet, 1 Packet, Oral, QDAY PRN **AND** magnesium hydroxide (MILK OF MAGNESIA) suspension 30 mL, 30 mL, Oral, QDAY PRN **AND** bisacodyl (DULCOLAX) suppository 10 mg, 10 mg,  Rectal, QDAY PRN, Jovanni Reed D.O.    traZODone (DESYREL) tablet 50 mg, 50 mg, Oral, Nightly, Jovanni Reed D.O., 50 mg at 08/13/22 2156      Fluids  No intake or output data in the 24 hours ending 08/14/22 1308      Laboratory  Recent Labs     08/12/22  2216   WBC 12.5*   RBC 4.57   HEMOGLOBIN 15.1   HEMATOCRIT 44.6   MCV 97.6   MCH 33.0   MCHC 33.9   RDW 47.6   PLATELETCT 157*   MPV 11.4       Recent Labs     08/12/22  2216   SODIUM 135   POTASSIUM 4.3   CHLORIDE 101   CO2 23   GLUCOSE 114*   BUN 24*   CREATININE 0.86   CALCIUM 8.2*                     Imaging  CT-CHEST,ABDOMEN,PELVIS WITH   Final Result      1.  There is collapse of the left upper pulmonary lobe and partial collapse of the left lower pulmonary lobe. Left-sided bronchial occlusion is noted and there is soft tissue prominence in the hilar/perihilar region which could be due to lung carcinoma.      2.  Borderline enlarged precarinal lymph node in the mediastinum is also noted. Tumor involvement is a possibility.      3.  Mild increase in size of left pleural effusion since the prior CT.      4.  Enhancement defect in the spleen is identified which is likely due to splenic infarct.      5.  Emphysema and scattered fibrotic opacifications are noted within the aerated lung bilaterally.         8/11/2022 3:03 PM     HISTORY/REASON FOR EXAM:  Non-small cell lung cancer, staging; NSCLC staging.        TECHNIQUE/EXAM DESCRIPTION:   MRI of the brain without and with contrast.     T1 sagittal, T2 fast spin-echo axial, T1 coronal, FLAIR coronal, diffusion-weighted and apparent diffusion coefficient (ADC map) axial images were obtained of the whole brain. T1 postcontrast axial and T1 postcontrast coronal images were obtained.     The study was performed on a [employer] Cor[employer] 1.5 Silvia MRI scanner.     20 mL ProHance contrast was administered intravenously.     COMPARISON:  None.     FINDINGS:     There is no abnormal nodular parenchymal contrast enhancement.  There is no acute infarct. There is no acute or chronic parenchymal hemorrhage. A few nonspecific T2 hyperintensities are noted in the subcortical and periventricular white   matter. Mild cerebral volume loss is seen. There is an approximately 9 x 9 mm sized extra-axial lesion along the sides of the anterior falx cerebri.  There is no large lesion identified in the expected course of the intracranial portions of the cranial   nerves. The skull bones are unremarkable. The paranasal sinuses are clear. The extracranial soft tissue including orbits appear grossly normal.           IMPRESSION:     1.  There is no evidence of abnormal nodular parenchymal enhancement to suggest any parenchymal metastasis. There is no osseous lesion.  2.  There is an approximately 9 x 9 mm sized extra-axial lesion in the interhemispheric region likely representing an incidental meningioma.    Assessment/Plan  Nicotine dependence with withdrawal  Assessment & Plan  Replacement patches and gums per my orders.    Endobronchial mass- (present on admission)  Assessment & Plan  8/10 Bronchoscopy/EBUS w/ biopsy/BAL -> complete occlusion of left mainstem.   Biopsy preliminary read positive for malignancy pending final path.       Alcohol dependence (HCC)- (present on admission)  Assessment & Plan  CIWA scores itself medical have been 0, CIWA scores on arrival here 0, no need for continued CIWA protocol    A-fib (HCC)- (present on admission)  Assessment & Plan  New onset at OSH, now on oral diltiazem and rate controlled.  Echo with preserved EF  [id number] 1 no indication for anticoagulation additionally endobronchial lesion raises concern for bleeding with anticoagulation.  HR has been stable.  May d/c tele and transfer to oncology    COPD (chronic obstructive pulmonary disease) (HCC)- (present on admission)  Assessment & Plan  She was treated for COPD exacerbation at OSH in [facility], she had 1 more dose of prednisone to complete which I  have ordered.  RT consult, continuous pulse ox, incentive spirometry, DuoNebs as needed.  No longer with any wheeze.  Continue DuoNeb, Symbicort    Primary lung squamous cell carcinoma, left (HCC)- (present on admission)  Assessment & Plan  CT shows Hilar mass with compression of bronchus and left upper lobe with partial obstruction and concurrent pleural effusion, status post bronc on 8/10/22 awaiting final path report.  Additionally with endobronchial mass concerning for cancer.  Transferred to [facility] for possible radiation therapy.  Dr. Newell's 8/14 input appreciated.   Dr. Land 8/14 input further appreciated.     Plan for initiation of Chemo/XRT once path returned.       Acute respiratory failure with hypoxia (HCC)- (present on admission)  Assessment & Plan  Secondary to lung mass with collapse and postobstructive pneumonia, on CAP coverage continuing on ceftriaxone.  BAL cultures negative to date  Will complete 7 days of CAP coverage.  Aspiration precaution.

## 2022-08-14 NOTE — CONSULTS
RADIATION ONCOLOGY CONSULT    DATE OF SERVICE: 8/14/2022    IDENTIFICATION: A 62 y.o. female with history of COPD and endometrial cancer now with an obstructive lesion involving the left hilum with left lung collapse status post bronchoscopy with biopsies pending.  She is here at the kind request of Dr. Umaña.      HISTORY OF PRESENT ILLNESS: Patient's history began with some abdominal pain and nausea and vomiting was diagnosed with atrial fibrillation.  CT angiogram 8/5/2022 was negative for PE but she was found to have a left hilar mass causing complete occlusion of the left mainstem bronchus at [facility]. She was transferred to [facility] underwent a bronchoscopy 8/10/2022 pathology is pending.  There was complete occlusion of the left mainstem bronchus.Currently she is resting comfortably in bed though she has complete obstruction on the left mainstem bronchus. She is seen today about the role of radiation therapy in her overall treatment plan.It should be noted that she was treated for endometrial carcinoma with CARMELITA/BSO followed by brachytherapy in 2014.Staging to date includes MRI scan of the brain 8/11/2022 which is negative for metastatic disease.CT scan chest abdomen pelvis that has been ordered by Dr. Espinoza.    PAST MEDICAL HISTORY:   Past Medical History:   Diagnosis Date    Arthritis     hips    Cancer (HCC) 2014    uterine    Cholesterol blood decreased     Other specified symptom associated with female genital organs     post menopausal bleeding       PAST SURGICAL HISTORY:  Past Surgical History:   Procedure Laterality Date    [place] BRONCHOSCOPY,DIAGNOSTIC N/A 8/10/2022    Procedure: BRONCHOSCOPY WITH ENDOBRONCHIAL ULTRASOUND;  Surgeon: Bonnie Boucher M.D.;  Location: SURGERY [facility];  Service: Pulmonary    ENDOBRONCHIAL US ADD-ON N/A 8/10/2022    Procedure: ENDOBRONCHIAL ULTRASOUND (EBUS);  Surgeon: Bonnie Boucher M.D.;  Location: SURGERY [facility];  Service: Pulmonary     HYSTERECTOMY ROBOTIC XI  2014    Performed by Anjum Simpson M.D. at SURGERY [facility] ORS    LYMPH NODE DISSECTION ROBOTIC XI  2014    Performed by Anjum Simpson M.D. at SURGERY [facility] ORS    OMENTECTOMY  2014    Performed by Anjum Simpson M.D. at SURGERY [facility]    DILATION AND CURETTAGE  2014    Performed by Sarath Waller M.D. at SURGERY SAME DAY [facility]    GYN SURGERY      tubal ligation       GYNECOLOGICAL STATUS:      CURRENT MEDICATIONS:  Current Facility-Administered Medications   Medication Dose Route Frequency Provider Last Rate Last Admin    nicotine (NICODERM) 21 MG/24HR 21 mg  21 mg Transdermal Daily-0600 Claudio Umaña M.D.   21 mg at 22 0555    And    nicotine polacrilex (NICORETTE) 2 MG piece 2 mg  2 mg Oral Q HOUR PRN Claudio Umaña M.D.        DILTIAZem CD (CARDIZEM CD) capsule 360 mg  360 mg Oral DAILY Claudio Umaña M.D.   360 mg at 22 0556    enoxaparin (Lovenox) inj 40 mg  40 mg Subcutaneous DAILY AT 1800 Claudio Umaña M.D.   40 mg at 22 1847    acetaminophen (Tylenol) tablet 650 mg  650 mg Oral Q6HRS PRN Jovanni Reed D.O.   650 mg at 22 0956    budesonide-formoterol (SYMBICORT) 80-4.5 MCG/ACT inhaler 2 Puff  2 Puff Inhalation BID (RT) BLAIR HerringO.   2 Puff at 22 2250    cefTRIAXone (Rocephin) syringe 2 g  2 g Intravenous Q EVENING BLAIR HerringO.   2 g at 22 1847    guaiFENesin dextromethorphan (ROBITUSSIN DM) 100-10 MG/5ML syrup 10 mL  10 mL Oral Q6HRS PRN BLAIR HerringO.        insulin regular (HumuLIN R,NovoLIN R) injection  1-6 Units Subcutaneous Q6HRS AMINATA Herring.O.        And    dextrose 10 % BOLUS 25 g  25 g Intravenous Q15 MIN PRN Jovanni Reed D.O.        ipratropium-albuterol (DUONEB) nebulizer solution  3 mL Nebulization Q2HRS PRN (RT) Jovanni Reed D.O.        labetalol (NORMODYNE/TRANDATE) injection 10 mg  10 mg Intravenous Q4HRS PRN Jovanni Reed D.O.         ondansetron (ZOFRAN ODT) dispertab 4 mg  4 mg Oral Q4HRS PRN Jovanni Reed D.O.        ondansetron (ZOFRAN) syringe/vial injection 4 mg  4 mg Intravenous Q4HRS PRN Jovanni Reed D.O.        prochlorperazine (COMPAZINE) injection 5-10 mg  5-10 mg Intravenous Q4HRS PRN Jovanni Reed D.O.        promethazine (PHENERGAN) suppository 12.5-25 mg  12.5-25 mg Rectal Q4HRS PRN Jovanni Reed D.O.        promethazine (PHENERGAN) tablet 12.5-25 mg  12.5-25 mg Oral Q4HRS PRN Jovanni Reed D.O.        Respiratory Therapy Consult   Nebulization Continuous RT Jovanni Reed D.O.        senna-docusate (PERICOLACE or SENOKOT S) 8.6-50 MG per tablet 2 Tablet  2 Tablet Oral BID Jovanni Reed D.O.   2 Tablet at 08/13/22 1847    And    polyethylene glycol/lytes (MIRALAX) PACKET 1 Packet  1 Packet Oral QDAY PRN Jovanni Reed D.O.        And    magnesium hydroxide (MILK OF MAGNESIA) suspension 30 mL  30 mL Oral QDAY PRN Jovanni Reed D.O.        And    bisacodyl (DULCOLAX) suppository 10 mg  10 mg Rectal QDAY PRN Jovanni Reed D.O.        traZODone (DESYREL) tablet 50 mg  50 mg Oral Nightly Jovanni Reed D.O.   50 mg at 08/13/22 2156       ALLERGIES:    Patient has no known allergies.    FAMILY HISTORY:    No family history on file.[unfilled]        SOCIAL HISTORY:     reports that she has been smoking cigarettes. She has a 17.50 pack-year smoking history. She does not have any smokeless tobacco history on file. She reports current alcohol use. She reports that she does not use drugs.  Patient lives in [place]    Review of Systems:   Constitutional: Negative fever, chills, sweats, or weight loss  HENT: Negative  neck pain, headache or dizziness, hearing loss  Eyes: Negative  vision changes  Respiratory: Denies hemoptysis. Did have mild shortness of breath  Cardiovascular: Negative  palpations, chest pain or easy fatiguability   Gastrointestinal: Negative  diarrhea, abdominal pain or constipation.  No blood in  stool.  Genitourinal: Negative  hematuria, dysuria, incontinence  Musculoskeletal: Negative  back pain or joint pain   Skin: Negative  itching or rash, or new lesions  Neurological: Negative  numbness or tingling, weakness, siezures  Endocrine: Negative  thyroid problems or diabetes  Psyche: Negative  depression, anxiety, mood changes     PAIN: Denies      PHYSICAL EXAM:    2= Ambulatory and capable of all self care, but unable to carry out any work activities.  Up and about more than 50% of waking hours.  Vitals:    08/13/22 2251 08/13/22 2306 08/14/22 0556 08/14/22 0821   BP:  115/87 (!) 137/98 121/76   Pulse: 78 82 88 78   Resp: 17 17  16   Temp:  36.8 °C (98.2 °F)  36.7 °C (98 °F)   TempSrc:  Temporal  Temporal   SpO2: 94% 94%  95%   Weight:       Height:       Location: Flank  Description: Dull        GENERAL: Well-appearing alert and oriented x3 resting comfortably in her bed  HEENT:  Pupils are equal, round, and reactive to light.  Extraocular muscles   are intact. Sclerae nonicteric.  Conjunctivae pink.  Oral cavity, tongue   protrudes midline.   NECK:   No peripheral adenopathy of the neck, supraclavicular fossa or axillae   bilaterally.  LUNGS:  Absent breath sounds left lung.  Decreased breath sounds in the right  HEART:  Regular rate and rhythm.  No murmur appreciated  ABDOMEN:  Soft. No evidence of hepatosplenomegaly.    EXTREMITIES:  Without Edema.  NEUROLOGIC:  Cranial nerves II through XII were intact. Motor and sensory grossly within normal limits            IMPRESSION:    A 62 y.o. with  history of COPD and endometrial cancer now with an obstructive lesion involving the left hilum with left lung collapse status post bronchoscopy with biopsies pending.      RECOMMENDATIONS:   I had a long discussion with the patient regarding diagnosis prognosis and treatment.  It does look like this is most likely a primary lung cancer however we need to get the final pathology to make sure this is not metastatic  endometrial carcinoma.  Final recommendations will be based on the pathology and hopefully will have it back tomorrow.  Since she does not appear to be requiring emergent radiation therapy I am recommending further work-up.We will make final recommendations once the pathologist is here.  She understands that if this is a non-small cell lung cancer she would be treated with concurrent chemotherapy and radiation therapy upfront over course of 6 weeks.  If she is small cell lung cancer radiation therapy could be let deliver twice a day over 3-week period.  If it is metastatic endometrial cancer we could use a short palliative course of approximately 2 weeks.  I've described the details of radiation along with the side effects both acute and chronic, including but not exclusive to fatigue, skin reaction, local soreness, swelling, delayed healing, scarring fibrosis discoloration. Ample time was allowed for questions, and patient understands.    I will make final recommendations once pathology and final work-up has been done.    Thank you for the opportunity to participate in her care.  If any questions or comments, please do not hesitate in calling.    Please note that this dictation was created using voice recognition software. I have made every reasonable attempt to correct obvious errors, but I expect that there are errors of grammar and possibly content that I did not discover before finalizing the note.

## 2022-08-14 NOTE — PROGRESS NOTES
MEI robison MD re pt request for off tele for shower.  Pt consented and signed iodine contrast consent    1110 Reported to attending MD that Dr. Newell Rad Onc has written note and seen pt this AM    1140 MEI robison MD re change Q6 BS to [facility]?  MD endorses transfer to med/surg and BS [facility]

## 2022-08-14 NOTE — CONSULTS
DATE OF SERVICE:  08/14/2022     INPATIENT ONCOLOGY CONSULTATION     HISTORY OF PRESENT ILLNESS:  The patient is a very nice 62-year-old woman with   a history of hyperlipidemia, COPD, alcoholism, depression, chronic back pain,   as well as an endometrial cancer, treated with surgical resection by Dr. Simpson   in 2014, who has now been found to have a large left hilar mass causing   postobstructive symptoms in the left lung.  This is suspicious for malignancy.    We have been asked to consult in the case.     The patient presented to [facility] [place], operated by [facility] around early August because   of abdominal pain as well as some nausea and vomiting.  Her workup showed   atrial fibrillation and she was started on a diltiazem drip.  She had a CT   angiogram that was negative for pulmonary embolism.  She had a left hilar mass   causing some compression of the left main stem bronchus with some obstruction   of the left upper lobe of the lung and a new pleural effusion.     She was transferred to [facility] for further evaluation.    She had a consultation with Dr. Boucher [facility].  On   8/10/2022, she underwent a bronchoscopy with EBUS.  The bronchoscopy showed a   fungating mass in the left mainstem, with complete obstruction of the left   main stem.  Biopsies were taken of the mass.  Needle biopsies of the lymph   node stations were also done.  The pathology is still pending.     It was decided to transfer the patient to [facility] on 8/11/2022   for consideration of Oncology and Radiation Oncology evaluation.  Dr. Strickland   was contacted.  He will see the patient on Monday, August 15th.  She is   otherwise medically stable.  She has been put on ceftriaxone for possible   postobstructive pneumonia.  We have been asked to consult in the case.     PAST MEDICAL HISTORY:  1.  Endometrial cancer -- treated by Dr. Simpson in 2014.  2.  Hyperlipidemia.  3.  COPD.  4.  Depression.  5.   Alcoholism.  6.  Chronic low back pain -- degenerative disk disease.     PAST SURGICAL HISTORY:  1.  D and C procedure on 8/14/2014.  2.  Robotic CARMELITA, lymph node dissection, omentectomy -- 9/25/2014.     ALLERGIES:  No known drug allergies.     MEDICATIONS:  In the hospital:  1.  Ceftriaxone 2 grams IV every day.  2.  Symbicort inhaler 2 puffs b.i.d.  3.  Diltiazem 360 mg every day.  4.  Lovenox 40 mg subcutaneously every day.  5.  Insulin sliding scale.  6.  Nicoderm 21 mg per 24 hour patch.  7.  Bowel regimen.  8.  Trazodone 50 mg p.o. at bedtime p.r.n. insomnia.  9.  Zofran p.r.n.  10.  Promethazine p.r.n.     FAMILY HISTORY:  Her mom had a history of uterine cancer.  Her sister had   possibly cervical cancer.     SOCIAL HISTORY:  She was born in [place].  She lives in [place], [place].    She lives with her boyfriend.  She is .  She has 2 grown children.    She works as a caregiver.  She has smoked since age 17 and smoking 2 packs per   day.  She tells me she has now quit with this admission.  In terms of   alcohol, she drinks 6-12 alcohol drinks, liquor per day.  She uses marijuana   one time per month to help with her back pain.     REVIEW OF SYSTEMS:  Positive for shortness of breath and dyspnea on exertion.    She has a chronic dry cough, which is nonproductive.  She denies any fevers   or chills.  She has some palpitations as well as some swelling in the legs off   and on.  She has chronic low back pain.  She has some occasional heartburn.    Otherwise, a complete review of systems per the AMA and CMS criteria is   negative.     PHYSICAL EXAMINATION:  VITAL SIGNS:  Her height is 5 feet 8 inches, her weight 199 pounds, her T-max   is 98.2, pulse of 88, blood pressure 137/98, respirations 17, and satting 90%   on 3 liters nasal cannula.  GENERAL:  No acute distress, pleasant woman, appears her stated age.  HEENT:  Normocephalic, atraumatic.  Sclerae are anicteric.  Conjunctivae   clear.  Oropharynx  is clear.  Edentulous.  No erythema, exudate or discharge.  NECK:  Supple, nontender, no JVP, no carotid bruits, no thyromegaly.  CHEST:  Clear to auscultation and percussion bilaterally.  She has decreased   breath sounds throughout the entire left lung, but no wheezes, rales or   rhonchi.  CARDIOVASCULAR:  Somewhat irregular rate and rhythm.  Normal S1, S2.  No   murmurs, gallops or rubs.  ABDOMEN:  Soft, nontender, nondistended. No hepatosplenomegaly.  No guarding,   rebound, or masses.  Normoactive bowel sounds.  LYMPH NODES:  No cervical, supraclavicular, infraclavicular, axillary or   inguinal lymphadenopathy.  EXTREMITIES:  No cyanosis, clubbing or edema.  Full range of motion.  No joint   swelling.  SKIN:  No rashes, bruising, petechiae, ulcerations, or nonhealing wounds.  NEUROLOGIC:  Cranial nerves II-XII are intact.  She has intact sensation to   light touch throughout.  She moves all 4 extremities appropriately.     LABORATORY DATA:  White blood count 12.5, hemoglobin 15.1, hematocrit 44.6%,   platelets 157,000, MCV 98 with a normal differential.  She had interim input   sodium 135, potassium 4.3, chloride 101, CO2 of 23, BUN 24, creatinine 0.86,   and glucose 114. Calcium 8.2. AST 14, ALT 24, alkaline phosphatase 38,   bilirubin 0.3, albumin 3.7, protein 5.8.  She had interim input imaging data:    MRI of the brain shows a 9-mm meningioma, but no evidence of metastatic   disease.     ASSESSMENT AND PLAN:  The patient is a very nice 62-year-old woman with a   medical history listed above.  She has now been found to have a left hilar   mass causing some postobstructive symptoms.  She had a bronchoscopy with EBUS   on August 10th.  Biopsies were taken, but the path is still pending.  We have   been asked to consult in the case.     At this point, this is certainly suspicious for malignancy.  The question is   just what kind of malignancy this is.  I guess there is some case that it   could be a recurrent  endometrial cancer, but clinically, given her smoking   history, this is most likely a primary lung cancer.     We would recommend keeping her in the hospital until we get the exact   diagnosis.  If this is a small cell lung cancer, it may require more urgent   upfront treatment with chemotherapy as well as the potential for radiation. If   it is a non-small cell lung cancer, she may benefit from a short course of   palliative radiation up front as well.  She needs to see the radiation   oncologists this Monday.     If she does enter course of radiation, this may be a little bit difficult   given the distance that she lives.  She may have to travel back and forth   daily for unspecified period of time for the radiation.  This is something   that can be considered in her discharge planning.     At this point, I will order a CT scan of the chest, abdomen and pelvis for   further staging.  She has already had the MRI of the brain.  We will await the   pathology results.     My partner, Dr. Hermosillo will be taking over the service starting tomorrow.    She will continue to follow the patient.     Thank you very much for referral of this nice woman.        ______________________________  Santos Espinoza MD    [facility]    DD:  08/14/2022 08:48  DT:  08/14/2022 09:42    Job#:  [id number]

## 2022-08-15 PROBLEM — G89.3 CANCER-RELATED PAIN: Status: ACTIVE | Noted: 2022-01-01

## 2022-08-15 PROBLEM — I48.0 PAROXYSMAL ATRIAL FIBRILLATION (HCC): Status: ACTIVE | Noted: 2022-01-01

## 2022-08-15 PROBLEM — F17.200 TOBACCO USE DISORDER: Status: ACTIVE | Noted: 2022-01-01

## 2022-08-15 PROBLEM — J44.9 COPD WITHOUT EXACERBATION (HCC): Status: ACTIVE | Noted: 2022-01-01

## 2022-08-15 NOTE — PROGRESS NOTES
4 Eyes Skin Assessment Completed by Mira VALDIVIA, RN and Karla LEAL RN.    Head WDL  Ears WDL  Nose WDL  Mouth WDL  Neck WDL  Breast/Chest WDL  Shoulder Blades WDL  Spine WDL  (R) Arm/Elbow/Hand WDL  (L) Arm/Elbow/Hand WDL  Abdomen WDL  Groin WDL  Scrotum/Coccyx/Buttocks WDL  (R) Leg Bruising  (L) Leg Bruising  (R) Heel/Foot/Toe WDL  (L) Heel/Foot/Toe WDL          Devices In Places Nasal Cannula      Interventions In Place Waffle Overlay and Pressure Redistribution Mattress    Possible Skin Injury No    Pictures Uploaded Into Epic N/A  Wound Consult Placed N/A  RN Wound Prevention Protocol Ordered No

## 2022-08-15 NOTE — CARE PLAN
"The patient is Stable - Low risk of patient condition declining or worsening    Shift Goals  Clinical Goals: Pain management  Patient Goals: Talk to doctors    Progress made toward(s) clinical / shift goals: Pain of the left flank; tylenol administered which helped \"get the edge off.\" Dr. Ramos ordered percocet, which improved. Awaiting pathology to determine course of treatment.     Problem: Knowledge Deficit - Standard  Goal: Patient and family/care givers will demonstrate understanding of plan of care, disease process/condition, diagnostic tests and medications  Outcome: Progressing     Problem: Knowledge Deficit - COPD  Goal: Patient/significant other demonstrates understanding of disease process, utilization of the Action Plan, medications and discharge instruction  Outcome: Progressing     Problem: Risk for Infection - COPD  Goal: Patient will remain free from signs and symptoms of infection  Outcome: Progressing     Problem: Impaired Gas Exchange  Goal: Patient will demonstrate improved ventilation and adequate oxygenation and participate in treatment regimen within the level of ability/situation.  Outcome: Progressing     Problem: Lifestyle Changes  Goal: Patient's ability to identify lifestyle changes and available resources to help reduce recurrence of condition will improve  Outcome: Progressing     Problem: Pain - Standard  Goal: Alleviation of pain or a reduction in pain to the patient’s comfort goal  Outcome: Progressing         "

## 2022-08-16 NOTE — PROGRESS NOTES
On August 16, 2022, Oncology Social Worker Nighat Brown attempted telephone contact with pt.  OSW Kevin left voicemail message for pt. requesting pt. call back at earliest convenience.  OSW Kevin left contact information in voicemail message.

## 2022-08-16 NOTE — DISCHARGE INSTRUCTIONS
Discharge Instructions    Discharged to home by car with relative. Discharged via wheelchair, hospital escort: Yes.  Special equipment needed: Not Applicable    Be sure to schedule a follow-up appointment with your primary care doctor or any specialists as instructed.     Discharge Plan:   Diet Plan: Discussed  Activity Level: Discussed  Confirmed Follow up Appointment: Patient to Call and Schedule Appointment  Confirmed Symptoms Management: Discussed  Medication Reconciliation Updated: Yes    I understand that a diet low in cholesterol, fat, and sodium is recommended for good health. Unless I have been given specific instructions below for another diet, I accept this instruction as my diet prescription.   Other diet:     Special Instructions: None    -Is this patient being discharged with medication to prevent blood clots?  No    Is patient discharged on Warfarin / Coumadin?   No     Chronic Obstructive Pulmonary Disease  Chronic obstructive pulmonary disease (COPD) is a long-term (chronic) lung problem. When you have COPD, it is hard for air to get in and out of your lungs. Usually the condition gets worse over time, and your lungs will never return to normal. There are things you can do to keep yourself as healthy as possible.  Your doctor may treat your condition with:  Medicines.  Oxygen.  Lung surgery.  Your doctor may also recommend:  Rehabilitation. This includes steps to make your body work better. It may involve a team of specialists.  Quitting smoking, if you smoke.  Exercise and changes to your diet.  Comfort measures (palliative care).  Follow these instructions at home:  Medicines  Take over-the-counter and prescription medicines only as told by your doctor.  Talk to your doctor before taking any cough or allergy medicines. You may need to avoid medicines that cause your lungs to be dry.  Lifestyle  If you smoke, stop. Smoking makes the problem worse. If you need help quitting, ask your doctor.  Avoid  being around things that make your breathing worse. This may include smoke, chemicals, and fumes.  Stay active, but remember to rest as well.  Learn and use tips on how to relax.  Make sure you get enough sleep. Most adults need at least 7 hours of sleep every night.  Eat healthy foods. Eat smaller meals more often. Rest before meals.  Controlled breathing  Learn and use tips on how to control your breathing as told by your doctor. Try:  Breathing in (inhaling) through your nose for 1 second. Then, pucker your lips and breath out (exhale) through your lips for 2 seconds.  Putting one hand on your belly (abdomen). Breathe in slowly through your nose for 1 second. Your hand on your belly should move out. Pucker your lips and breathe out slowly through your lips. Your hand on your belly should move in as you breathe out.    Controlled coughing  Learn and use controlled coughing to clear mucus from your lungs. Follow these steps:  Lean your head a little forward.  Breathe in deeply.  Try to hold your breath for 3 seconds.  Keep your mouth slightly open while coughing 2 times.  Spit any mucus out into a tissue.  Rest and do the steps again 1 or 2 times as needed.  General instructions  Make sure you get all the shots (vaccines) that your doctor recommends. Ask your doctor about a flu shot and a pneumonia shot.  Use oxygen therapy and pulmonary rehabilitation if told by your doctor. If you need home oxygen therapy, ask your doctor if you should buy a tool to measure your oxygen level (oximeter).  Make a COPD action plan with your doctor. This helps you to know what to do if you feel worse than usual.  Manage any other conditions you have as told by your doctor.  Avoid going outside when it is very hot, cold, or humid.  Avoid people who have a sickness you can catch (contagious).  Keep all follow-up visits as told by your doctor. This is important.  Contact a doctor if:  You cough up more mucus than usual.  There is a  change in the color or thickness of the mucus.  It is harder to breathe than usual.  Your breathing is faster than usual.  You have trouble sleeping.  You need to use your medicines more often than usual.  You have trouble doing your normal activities such as getting dressed or walking around the house.  Get help right away if:  You have shortness of breath while resting.  You have shortness of breath that stops you from:  Being able to talk.  Doing normal activities.  Your chest hurts for longer than 5 minutes.  Your skin color is more blue than usual.  Your pulse oximeter shows that you have low oxygen for longer than 5 minutes.  You have a fever.  You feel too tired to breathe normally.  Summary  Chronic obstructive pulmonary disease (COPD) is a long-term lung problem.  The way your lungs work will never return to normal. Usually the condition gets worse over time. There are things you can do to keep yourself as healthy as possible.  Take over-the-counter and prescription medicines only as told by your doctor.  If you smoke, stop. Smoking makes the problem worse.  This information is not intended to replace advice given to you by your health care provider. Make sure you discuss any questions you have with your health care provider.  Document Released: 06/05/2009 Document Revised: 11/30/2018 Document Reviewed: 01/22/2018  [employer] Patient Education © 2020 [employer]    Smoking Cessation, Tips for Success  If you are ready to quit smoking, congratulations! You have chosen to help yourself be healthier. Cigarettes bring nicotine, tar, carbon monoxide, and other irritants into your body. Your lungs, heart, and blood vessels will be able to work better without these poisons. There are many different ways to quit smoking. Nicotine gum, nicotine patches, a nicotine inhaler, or nicotine nasal spray can help with physical craving. Hypnosis, support groups, and medicines help break the habit of smoking.  WHAT THINGS CAN  "I DO TO MAKE QUITTING EASIER?   Here are some tips to help you quit for good:  Pick a date when you will quit smoking completely. Tell all of your friends and family about your plan to quit on that date.  Do not try to slowly cut down on the number of cigarettes you are smoking. Pick a quit date and quit smoking completely starting on that day.  Throw away all cigarettes.    Clean and remove all ashtrays from your home, work, and car.  On a card, write down your reasons for quitting. Carry the card with you and read it when you get the urge to smoke.  Cleanse your body of nicotine. Drink enough water and fluids to keep your urine clear or pale yellow. Do this after quitting to flush the nicotine from your body.  Learn to predict your moods. Do not let a bad situation be your excuse to have a cigarette. Some situations in your life might tempt you into wanting a cigarette.  Never have \"just one\" cigarette. It leads to wanting another and another. Remind yourself of your decision to quit.  Change habits associated with smoking. If you smoked while driving or when feeling stressed, try other activities to replace smoking. Stand up when drinking your coffee. Brush your teeth after eating. Sit in a different chair when you read the paper. Avoid alcohol while trying to quit, and try to drink fewer caffeinated beverages. Alcohol and caffeine may urge you to smoke.  Avoid foods and drinks that can trigger a desire to smoke, such as sugary or spicy foods and alcohol.  Ask people who smoke not to smoke around you.  Have something planned to do right after eating or having a cup of coffee. For example, plan to take a walk or exercise.  Try a relaxation exercise to calm you down and decrease your stress. Remember, you may be tense and nervous for the first 2 weeks after you quit, but this will pass.  Find new activities to keep your hands busy. Play with a pen, coin, or rubber band. Doodle or draw things on paper.  Brush your " "teeth right after eating. This will help cut down on the craving for the taste of tobacco after meals. You can also try mouthwash.    Use oral substitutes in place of cigarettes. Try using lemon drops, carrots, cinnamon sticks, or chewing gum. Keep them handy so they are available when you have the urge to smoke.  When you have the urge to smoke, try deep breathing.  Designate your home as a nonsmoking area.  If you are a heavy smoker, ask your health care provider about a prescription for nicotine chewing gum. It can ease your withdrawal from nicotine.  Reward yourself. Set aside the cigarette money you save and buy yourself something nice.  Look for support from others. Join a support group or smoking cessation program. Ask someone at home or at work to help you with your plan to quit smoking.  Always ask yourself, \"Do I need this cigarette or is this just a reflex?\" Tell yourself, \"Today, I choose not to smoke,\" or \"I do not want to smoke.\" You are reminding yourself of your decision to quit.  Do not replace cigarette smoking with electronic cigarettes (commonly called e-cigarettes). The safety of e-cigarettes is unknown, and some may contain harmful chemicals.  If you relapse, do not give up! Plan ahead and think about what you will do the next time you get the urge to smoke.  HOW WILL I FEEL WHEN I QUIT SMOKING?  You may have symptoms of withdrawal because your body is used to nicotine (the addictive substance in cigarettes). You may crave cigarettes, be irritable, feel very hungry, cough often, get headaches, or have difficulty concentrating. The withdrawal symptoms are only temporary. They are strongest when you first quit but will go away within 10-14 days. When withdrawal symptoms occur, stay in control. Think about your reasons for quitting. Remind yourself that these are signs that your body is healing and getting used to being without cigarettes. Remember that withdrawal symptoms are easier to treat than " the major diseases that smoking can cause.   Even after the withdrawal is over, expect periodic urges to smoke. However, these cravings are generally short lived and will go away whether you smoke or not. Do not smoke!  WHAT RESOURCES ARE AVAILABLE TO HELP ME QUIT SMOKING?  Follow up with Oncology (Dr. Espinoza) as soon as possible for PET scan. Follow up with Radiation Oncology (oncology) as soon as possible after PET scan.      Your health care provider can direct you to community resources or hospitals for support, which may include:  Group support.  Education.  Hypnosis.  Therapy.     This information is not intended to replace advice given to you by your health care provider. Make sure you discuss any questions you have with your health care provider.     Document Released: 09/15/2005 Document Revised: 01/08/2016 Document Reviewed: 06/05/2014  [employer] Interactive Patient Education ©2016 [employer]

## 2022-08-16 NOTE — DISCHARGE PLANNING
Case Management Discharge Planning    Admission Date: 8/12/2022  GMLOS: 4.1  ALOS: 4    6-Clicks ADL Score: 24  6-Clicks Mobility Score: 24      Anticipated Discharge Dispo:      DME Needed: No    Action(s) Taken: Updated Provider/Nurse on Discharge Plan    Escalations Completed: None    Medically Clear: Yes    Next Steps:Team met to review status and coordinate discharge plans. The patient is clear for discharge to home today.     Patient will need home oxygen set-up. AYE reviewed the record and a prior referral was sent to [facility]. The order was not filled due to the patient being re-admitted to an acute setting. SW spoke to Alem ( [phone]) at the [place] office and Geovany ( [phone]) at the [place] office. Both parties will communicate and coordinate tank delivery to bedside. Team aware of delivery.     SW received calls from oncology department. Number for MTM was provided for medical transport and application for assistance was given to patient.     Barriers to Discharge: None    Is the patient up for discharge tomorrow: No

## 2022-08-16 NOTE — CT SIMULATION
PATIENT NAME Geena Richey   PRIMARY PHYSICIAN Pcp Pt States None [id number]   REFERRING PHYSICIAN No ref. provider found 1960     No matching staging information was found for the patient.       Treatment Planning CT Simulation        Order Questions       Question Answer    Is this for a new course of treatment? Yes    Is this an Addendum? No    Simulation Status Initial    Treatment Technique IMRT    Treatment Pattern/Frequency Daily    Concurrent Chemotherapy No    CT Technique 4D    Slice Thickness 3mm    Scan Extent Chest    Contrast Esophageal     IV    Treatment Device(s) Vac Alicia    Patient Attire Gown    Patient Position Supine    Patient Orientation Head First    Treatment Machine No preference    Treatment Image Guidance CBCT    Treatment Planning Image Fusion CT/CT    Other Orders Special Tx Procedure     Weekly Physics Check

## 2022-08-16 NOTE — CARE PLAN
The patient is Stable - Low risk of patient condition declining or worsening    Shift Goals  Clinical Goals: radiation simulation, pain control  Patient Goals: go home    Progress made toward(s) clinical / shift goals:      Problem: Knowledge Deficit - Standard  Goal: Patient and family/care givers will demonstrate understanding of plan of care, disease process/condition, diagnostic tests and medications  Outcome: Progressing  Note: A/Ox4, pt is able to understand plan of care. All questions answered at the moment.       Problem: Pain - Standard  Goal: Alleviation of pain or a reduction in pain to the patient’s comfort goal  Outcome: Progressing  Note: PRN pain medications given per MAR working effectively to promote comfort.        Patient is not progressing towards the following goals:

## 2022-08-16 NOTE — FACE TO FACE
"Face to Face Note  -  Durable Medical Equipment    Ashley Doyle M.D. - NPI: [id number]  I certify that this patient is under my care and that they had a durable medical equipment(DME)face to face encounter by myself that meets the physician DME face-to-face encounter requirements with this patient on:    Date of encounter:   Patient:                    MRN:                       YOB: 2021  Geena Richey  [id number]  1960     The encounter with the patient was in whole, or in part, for the following medical condition, which is the primary reason for durable medical equipment:  Other - Malignancy    I certify that, based on my findings, the following durable medical equipment is medically necessary:    Oxygen   HOME O2 Saturation Measurements:(Values must be present for Home Oxygen orders)  Room air sat at rest: 86  Room air sat with amb: 83  With liters of O2: 3, O2 sat at rest with O2: 90  With Liters of O2: 3, O2 sat with amb with O2 : 92  Is the patient mobile?: Yes  If patient feels more short of breath, they can go up to 6 liters per minute and contact healthcare provider.    Supporting Symptoms: The patient requires supplemental oxygen, as the following interventions have been tried with limited or no improvement: \"Bronchodilators and/or steroid inhalers, \"Positive expiratory pressure therapies, \"Oral and/or IV steroids, \"Ambulation with oximetry, and \"Incentive spirometry.    My Clinical findings support the need for the above equipment due to:  Hypoxia  "

## 2022-08-16 NOTE — PROGRESS NOTES
Hospital Medicine Daily Progress Note    Date of Service  8/15/2022    Chief Complaint  Geena Richey is a 62 y.o. female with h/o uterine cancer s/p resection, COPD from tobacco use DO, Afib, prediabetes, and chronic pain admitted 8/12/2022 with Left lung collapse from Left lung mass.    Hospital Course  No notes on file    Interval Problem Update  8/15: She has bad chest pain from the tumor. She has had prior benefit from percocet. She has a nonproductive cough without hemoptysis. Denies F/C, N/V, bowel/bladder dysfunction.     I have discussed this patient's plan of care and discharge plan at IDT rounds today with Case Management, Nursing, Nursing leadership, and other members of the IDT team.    POC discussed with Oncologist Dr. Hermosillo. Awaiting results of EBUS biopsy.    Consultants/Specialty  oncology, pulmonary, and Radiation Oncology    Code Status  Full Code    Disposition  Patient is not medically cleared for discharge.   Anticipate discharge to to home with close outpatient follow-up.  I have placed the appropriate orders for post-discharge needs.    Review of Systems  Review of Systems   Constitutional:  Negative for chills, fever and malaise/fatigue.   HENT:  Negative for ear pain, nosebleeds, sinus pain and sore throat.    Eyes:  Negative for pain.   Respiratory:  Positive for shortness of breath. Negative for cough and hemoptysis.    Cardiovascular:  Positive for chest pain. Negative for leg swelling.   Gastrointestinal:  Negative for abdominal pain, blood in stool, constipation, diarrhea, melena, nausea and vomiting.   Genitourinary:  Negative for dysuria, flank pain and hematuria.   Musculoskeletal:  Negative for back pain, joint pain, myalgias and neck pain.   Neurological:  Negative for headaches.   Endo/Heme/Allergies:  Does not bruise/bleed easily.   Psychiatric/Behavioral:  Negative for depression. The patient is not nervous/anxious.       Physical Exam  Temp:  [36.2 °C (97.1 °F)-37.6  °C (99.7 °F)] 36.2 °C (97.1 °F)  Pulse:  [7-113] 95  Resp:  [14-18] 16  BP: (104-132)/(53-92) 110/67  SpO2:  [91 %-95 %] 91 %    Physical Exam  Vitals and nursing note reviewed.   Constitutional:       General: She is not in acute distress.     Appearance: She is ill-appearing (Chronically). She is not toxic-appearing or diaphoretic.      Interventions: Nasal cannula in place.   HENT:      Head: Normocephalic.      Nose: Nose normal.      Mouth/Throat:      Mouth: Mucous membranes are moist.   Eyes:      General: No scleral icterus.     Conjunctiva/sclera: Conjunctivae normal.   Cardiovascular:      Rate and Rhythm: Normal rate. Rhythm irregularly irregular.      Pulses: Normal pulses.      Heart sounds: Normal heart sounds. No murmur heard.    No friction rub. No gallop.   Pulmonary:      Effort: Pulmonary effort is normal. No respiratory distress.      Breath sounds: Decreased air movement (LLL) present. Examination of the left-middle field reveals rhonchi. Examination of the left-lower field reveals decreased breath sounds. Decreased breath sounds and rhonchi present. No wheezing or rales.   Abdominal:      General: Abdomen is flat. Bowel sounds are normal. There is no distension.      Palpations: Abdomen is soft.      Tenderness: There is no abdominal tenderness. There is no guarding or rebound.   Genitourinary:     Comments: No jean  Musculoskeletal:      Cervical back: Neck supple.      Right lower leg: No edema.      Left lower leg: No edema.   Skin:     General: Skin is warm and dry.   Neurological:      Mental Status: She is alert.      Comments: Appropriately conversant   Psychiatric:         Mood and Affect: Mood normal.         Behavior: Behavior normal.         Thought Content: Thought content normal.         Judgment: Judgment normal.       Fluids    Intake/Output Summary (Last 24 hours) at 8/15/2022 1838  Last data filed at 8/14/2022 2034  Gross per 24 hour   Intake 120 ml   Output --   Net 120 ml        Laboratory  Recent Labs     08/12/22  2216   WBC 12.5*   RBC 4.57   HEMOGLOBIN 15.1   HEMATOCRIT 44.6   MCV 97.6   MCH 33.0   MCHC 33.9   RDW 47.6   PLATELETCT 157*   MPV 11.4     Recent Labs     08/12/22  2216   SODIUM 135   POTASSIUM 4.3   CHLORIDE 101   CO2 23   GLUCOSE 114*   BUN 24*   CREATININE 0.86   CALCIUM 8.2*                   Imaging  CT-CHEST,ABDOMEN,PELVIS WITH   Final Result      1.  There is collapse of the left upper pulmonary lobe and partial collapse of the left lower pulmonary lobe. Left-sided bronchial occlusion is noted and there is soft tissue prominence in the hilar/perihilar region which could be due to lung carcinoma.      2.  Borderline enlarged precarinal lymph node in the mediastinum is also noted. Tumor involvement is a possibility.      3.  Mild increase in size of left pleural effusion since the prior CT.      4.  Enhancement defect in the spleen is identified which is likely due to splenic infarct.      5.  Emphysema and scattered fibrotic opacifications are noted within the aerated lung bilaterally.           Assessment/Plan  Cancer-related pain- (present on admission)  Assessment & Plan  Percocet 5-10 q4h PRN per patient preference  Reassess if develops severe uncontrolled pain    Primary lung squamous cell carcinoma, left (HCC)- (present on admission)  Assessment & Plan  CT shows Hilar mass with compression of bronchus and left upper lobe with partial obstruction and concurrent pleural effusion, status post bronc on 8/10/22 awaiting final path report.  Bronchoscopy demonstrated endobronchial mass concerning for cancer invasive lung CA  Pathology demonstrates lung SCC  Transferred to [facility] for possible radiation therapy    Radiation Oncology consulted, awaiting final pathology results  Oncology consulted, awaiting final pathology results      Endobronchial mass- (present on admission)  Assessment & Plan  8/10 Bronchoscopy with EBUS and BAL, demonstrated complete  occlusion of left mainstem bronchus  Biopsy preliminary results were positive for malignancy  Final pathology demonstrated SCC - see separate plan      Tobacco use disorder- (present on admission)  Assessment & Plan  NRT patch and PRN lozenges    Paroxysmal atrial fibrillation (HCC)- (present on admission)  Assessment & Plan  Continue diltiazem for rate control  LMWH for VTE prophylaxis for now, transition to DOAC if no further procedures    Acute respiratory failure with hypoxia (HCC)- (present on admission)  Assessment & Plan  Secondary to lung mass with collapse and postobstructive pneumonia  BAL cultures negative to date  Completed 7 days of CAP coverage.  Aspiration precautions  RT consult      COPD (chronic obstructive pulmonary disease) ([facility])- (present on admission)  Assessment & Plan  She was treated for COPD exacerbation at OSH in [facility], she had 1 more dose of prednisone to complete which I have ordered.  RT consult, continuous pulse ox, incentive spirometry, DuoNebs as needed.  No longer with any wheeze.  Continue DuoNeb, Symbicort    COPD without exacerbation ([facility])- (present on admission)  Assessment & Plan  Continue oxygen  Duonebs PRN  RT consult    Uncomplicated alcohol dependence ([facility])- (present on admission)  Assessment & Plan  Without evidence of withdrawal at [facility] prior to transfer  Unlikely to be a candidate for naltrexone if requiring opiates for cancer-related pain       VTE prophylaxis: enoxaparin ppx    I have performed a physical exam and reviewed and updated ROS and Plan today (8/15/2022). In review of yesterday's note (8/14/2022), there are no changes except as documented above.

## 2022-08-16 NOTE — CARE PLAN
The patient is Watcher - Medium risk of patient condition declining or worsening    Shift Goals  Clinical Goals: rest  Patient Goals: rest    Progress made toward(s) clinical / shift goals:  Educate patient on plan of care and encourage pt to ask questions.

## 2022-08-16 NOTE — PROGRESS NOTES
Radiation therapy RN aRdha walk-in Consult 1 stating pt. Has a transportation barrier and lives [place]. Informed Radha that I would inform inpatient case management of transportation concern.    Outbound call to  Craig malone and informed her of pt. transportation barrier.

## 2022-08-16 NOTE — PROGRESS NOTES
.HEMATOLOGY-ONCOLOGY PROGRESS NOTE  Locally advanced SCC s/p EBUS and biopsies on 8/10/22 , station 10 L positive   - Staging MRI brain neg mets on 8/11/22   - Needs outpatient PET scan   - Likely ChemoRT     Subjective:  She is clinically stable on 2-2.5 L oxygen   She is anxious to go home . She denies any fevers, chills     Objective:  Medications reviewed and notable for:  Current Facility-Administered Medications   Medication Dose    oxyCODONE-acetaminophen (PERCOCET) 5-325 MG per tablet 1 Tablet  1 Tablet    Or    oxyCODONE-acetaminophen (PERCOCET) 5-325 MG per tablet 2 Tablet  2 Tablet    dextrose 10 % BOLUS 25 g  25 g    nicotine (NICODERM) 21 MG/24HR 21 mg  21 mg    And    nicotine polacrilex (NICORETTE) 2 MG piece 2 mg  2 mg    DILTIAZem CD (CARDIZEM CD) capsule 360 mg  360 mg    enoxaparin (Lovenox) inj 40 mg  40 mg    acetaminophen (Tylenol) tablet 650 mg  650 mg    budesonide-formoterol (SYMBICORT) 80-4.5 MCG/ACT inhaler 2 Puff  2 Puff    guaiFENesin dextromethorphan (ROBITUSSIN DM) 100-10 MG/5ML syrup 10 mL  10 mL    ipratropium-albuterol (DUONEB) nebulizer solution  3 mL    ondansetron (ZOFRAN ODT) dispertab 4 mg  4 mg    ondansetron (ZOFRAN) syringe/vial injection 4 mg  4 mg    prochlorperazine (COMPAZINE) injection 5-10 mg  5-10 mg    promethazine (PHENERGAN) suppository 12.5-25 mg  12.5-25 mg    promethazine (PHENERGAN) tablet 12.5-25 mg  12.5-25 mg    Respiratory Therapy Consult      senna-docusate (PERICOLACE or SENOKOT S) 8.6-50 MG per tablet 2 Tablet  2 Tablet    And    polyethylene glycol/lytes (MIRALAX) PACKET 1 Packet  1 Packet    And    magnesium hydroxide (MILK OF MAGNESIA) suspension 30 mL  30 mL    And    bisacodyl (DULCOLAX) suppository 10 mg  10 mg    traZODone (DESYREL) tablet 50 mg  50 mg       ROS:   Constitutional: +  fatigue, no fevers or chills, no night sweats  Resp: + cough or SOB  Cardio:No chest pain or palpitations  Pschy: No depression or anxiety   Neuro: No headaches, no  "seizure, no vision changes  GI: no abdominal pain, nausea or vomiting. No diarrhea or constipation   All other ROS negative    /70   Pulse 97   Temp 36.1 °C (96.9 °F) (Temporal)   Resp 16   Ht 1.727 m (5' 8\")   Wt 90.6 kg (199 lb 11.8 oz)   SpO2 92%       General:  comfortable, NAD  HEENT:  sclera anicteric, pupils equal, round, reactive to light, oral cavity and oropharynx clear, mucous membranes moist  Neck:   supple, no lymphadenopathy  Cor:   regular rate and rhythm, no murmurs, rubs, or gallops  Pulm:   Decreased breath sounds Left lung,   Abd:   bowel sounds present, soft, nontender, nondistended, no palpable masses or organomegaly  Extremities:  warm, no lower extremity edema  Neurologic:  A&O x 3  Pyschiatric:  Appropriate mood and affect    Labs reviewed and notable for:          .@CMP  No results found for this or any previous visit (from the past 24 hour(s)).    Diagnostic imaging: CT scan C/A/P: 8/14/22  IMPRESSION:     1.  There is collapse of the left upper pulmonary lobe and partial collapse of the left lower pulmonary lobe. Left-sided bronchial occlusion is noted and there is soft tissue prominence in the hilar/perihilar region which could be due to lung carcinoma.     2.  Borderline enlarged precarinal lymph node in the mediastinum is also noted. Tumor involvement is a possibility.     3.  Mild increase in size of left pleural effusion since the prior CT.     4.  Enhancement defect in the spleen is identified which is likely due to splenic infarct.     5.  Emphysema and scattered fibrotic opacifications are noted within the aerated lung bilaterally.    MRI:     IMPRESSION:     1.  There is no evidence of abnormal nodular parenchymal enhancement to suggest any parenchymal metastasis. There is no osseous lesion.  2.  There is an approximately 9 x 9 mm sized extra-axial lesion in the interhemispheric region likely representing an incidental meningioma.      Assessment and " Recommendations:  - Locally advanced SCC of primary lung s/p EBUS and biopsy on 8/10/22 - Planned chemo RT as outpatient   - COPD on 2-2.5 L oxygen   - Staging MRI brain neg mets - incidental asymptomatic meningioma   - Paroxysmal A. Fib   - History of Endometrial cancer     Plan:   Reviewed pathology with patient and she does has primary lung malignancy . No evidence of distant mets on imaging done in hospital except mild increase in left pleural effusion. Too small to drain. . She will need outpatient PET scan   - Notified Dr. Newell and Dr. Espinoza to make a follow up later this week to discuss plan and start treatment concurrently if the PET scan confirms no distant mets   - Primary team - ok to discharge from oncology point of view   - I will officially sign off.   - Patient will get a call from our office regarding chato soon with Dr. Espinoza       High complexicity/Drug monitoring     please call with any questions, [phone].      This patient was seen under COVID 19 pandemic disaster response conditions.  During a disaster, the provisions of care is subject to the Crisis Standard of Care     Please note that this dictation was created using voice recognition software.  I have made every reasonable attempt to correct obvious error, but I expected that there are errors of grammar and possibly context  that I did not discover before finalizing the note     Quality-Core Measures        Olivia Hermosillo MD  [facility]   [phone]

## 2022-08-16 NOTE — DISCHARGE SUMMARY
Discharge Summary    CHIEF COMPLAINT ON ADMISSION  No chief complaint on file.      Reason for Admission  L main stem bronchus occlusion     Admission Date  8/12/2022    CODE STATUS  Full Code    HPI & HOSPITAL COURSE  Geena Richey is a 62 y.o. female admitted 8/12/2022 with left lung collapse. She has a history of alcohol dependence (6-12 drinks daily), endometrial cancer, COPD, depression, overactive bladder, and chronic pain.  She initially presented to [facility] with abdominal pain.  She was found to be in atrial fibrillation with RVR.  She was started on a diltiazem drip.  CTA showed left hilar mass which compressed the bronchus and the left upper lobe with partial obstruction, and concurrent pleural effusion.  She was transitioned to oral diltiazem.  Patient was initially transferred to [facility].  She underwent bronchoscopy with EBUS on 8/10/2022.  Bronchoscopy showed a fungating mass in the left mainstem with complete obstruction of the left mainstem.  Biopsies were taken. She was transferred to [facility].      Patient completed a course of Ceftriaxone for CAP. Initial results of biopsy are consistent with squamous cell lung carcinoma. Oncology were consulted.  Per oncology, there is no evidence of distant metastases on imaging.      Dr. Hermosillo has notified  and  for outpatient follow-up.     No notes on file    Therefore, she is discharged in guarded and stable condition to home with close outpatient follow-up.      Discharge Date  8/16/2022    FOLLOW UP ITEMS POST DISCHARGE  Dr. Espinoza (Oncology), Reece Darby (Radiation Oncology)    DISCHARGE DIAGNOSES  Active Problems:    Acute respiratory failure with hypoxia (HCC) POA: Yes    Primary lung squamous cell carcinoma, left (HCC) POA: Yes    COPD (chronic obstructive pulmonary disease) (HCC) POA: Yes    Paroxysmal atrial fibrillation (HCC) POA: Yes    Uncomplicated alcohol dependence (HCC) POA: Yes    COPD  without exacerbation (HCC) POA: Yes    Tobacco use disorder POA: Yes    Endobronchial mass POA: Yes    Cancer-related pain POA: Yes  Resolved Problems:    * No resolved hospital problems. *      FOLLOW UP  Future Appointments   Date Time Provider Department Center   8/29/2022  1:00 PM Nicole Newell M.D. [facility] None   8/31/2022  3:40 PM Peg Cruz M.D. [facility] None     No follow-up provider specified.    MEDICATIONS ON DISCHARGE     Medication List        START taking these medications        Instructions   DILTIAZem  MG Cp24  Start taking on: August 17, 2022  Commonly known as: CARDIZEM CD   Take 1 Capsule by mouth every day.  Dose: 360 mg            CHANGE how you take these medications        Instructions   * oxyCODONE-acetaminophen 7.5-325 MG per tablet  What changed: Another medication with the same name was added. Make sure you understand how and when to take each.  Commonly known as: PERCOCET   Take 1-2 Tabs by mouth every 6 hours as needed (pain).  Dose: 1-2 Tablet     * oxyCODONE-acetaminophen 5-325 MG Tabs  What changed: You were already taking a medication with the same name, and this prescription was added. Make sure you understand how and when to take each.  Commonly known as: PERCOCET   Take 2 Tablets by mouth every four hours as needed for Severe Pain or Moderate Pain for up to 3 days.  Dose: 2 Tablet           * This list has 2 medication(s) that are the same as other medications prescribed for you. Read the directions carefully, and ask your doctor or other care provider to review them with you.                CONTINUE taking these medications        Instructions   Symbicort 80-4.5 MCG/ACT Aero  Generic drug: budesonide-formoterol   Inhale 2 Puffs by mouth 2 times a day.  Dose: 2 Puff     traZODone 50 MG Tabs  Commonly known as: DESYREL   Take 50 mg by mouth every evening.  Dose: 50 mg     Ventolin  (90 Base) MCG/ACT Aers inhalation aerosol  Generic drug: albuterol   Inhale 2  Puffs every four hours as needed for Shortness of Breath for up to 30 days.  Dose: 2 Puff            STOP taking these medications      amoxicillin-clavulanate 875-125 MG Tabs  Commonly known as: AUGMENTIN     DILTIAZem  MG Cp24  Commonly known as: CARDIZEM CD     doxycycline 100 MG capsule  Commonly known as: MONODOX     predniSONE 20 MG Tabs  Commonly known as: DELTASONE              Allergies  No Known Allergies    DIET  Orders Placed This Encounter   Procedures    Diet Order Diet: Regular     Standing Status:   Standing     Number of Occurrences:   1     Order Specific Question:   Diet:     Answer:   Regular [1]       ACTIVITY  As tolerated.      CONSULTATIONS  oncology, pulmonary, and Radiation Oncology    PROCEDURES  Bronchoscopy with EBUS    LABORATORY  Lab Results   Component Value Date    SODIUM 135 08/12/2022    POTASSIUM 4.3 08/12/2022    CHLORIDE 101 08/12/2022    CO2 23 08/12/2022    GLUCOSE 114 (H) 08/12/2022    BUN 24 (H) 08/12/2022    CREATININE 0.86 08/12/2022        Lab Results   Component Value Date    WBC 12.5 (H) 08/12/2022    HEMOGLOBIN 15.1 08/12/2022    HEMATOCRIT 44.6 08/12/2022    PLATELETCT 157 (L) 08/12/2022        Total time of the discharge process exceeds 37 minutes.

## 2022-08-16 NOTE — PROGRESS NOTES
Report received from day shift RN, assumed Care.   Patient is AOx4, responds appropriately.      Pain controlled at this time.  Patient is tolerating regular diet, denies nausea/vomiting. + flatus  Up self with steady gait.    Plan of care discussed, all questions answered.    Educated on use of call light and importance of calling when assistance is required. Pt verbalizes understanding.    Call light and belongings within reach, treaded slipper socks on, bed in lowest locked position.  All needs met at this time.

## 2022-08-23 NOTE — PROGRESS NOTES
RIAN Arora stated patient needing assistance with lodging and transportation, pt. Lives in [place] and will be starting treatment soon.     Outbound call to patient. Pt. States she has an appointment tomorrow with Dr. Espinoza and can be at [facility] to meet with RIAN Arora around 3:30pm. Pt. States that she may need to stay in town a few days for treatments and she owns two non-service dogs that she will need to bring with her. Provided pt. With lodging site locations, phone numbers, and informed if pet friendly. I also encouraged pt. To call and inquire about pet policy for locations that did not state if pets were accepted. I also educated patient on [employer] resource and mileage reimbursement possibility. Informed pt. That I would leave [employer] reimbursement trip log and instructions with FARZAD Arora. Pt. Reports no further needs at this time.

## 2022-08-24 NOTE — PROGRESS NOTES
Oncology Nurse Navigator received a call from patient stating they are traveling to [place].  They plan to attend appointment at [facility] and then to come to [facility] ([facility]) to meet with RIAN Logan to discuss financial & lodging assistance.  Patient and significant support person, Ramírez verbalize understanding of how to get to the [facility]. No further questions at this time.  Patient has my contadct information.      7.24.22 @ 1230.  Patient called RIAN Logan stating they have arrived into [place] early and would like to meet before their [facility] appointment.  RIAN Logan encouraged patient to head over for early meeting.    7.24.22 @ 1315.  RIAN Logan and Marci Hopkins, Oncology [facility] Specialist, met with patient and her significant other, Ramírez in [facility] Conference Room #1.  Patient shared she has a vehicle and is able to drive to [place], but cannot afford to travel back and forth..  She feels that a room at a local hotel will work  and she is able to drive to appointments and grocery store.  [facility]  & [facility] were reviewed, filled out & given to patient.  RCF application and AARON were completed by patient and returned to RIAN Logan for submission to Rachel Brenner,  - [place] [facility] foundation.    7.25.22 2 [id number]. Patient called to share the [facility] will not allow for a 6 week stay at their facility.  They stated standing policy & upcoming events in the [place] area.  New understanding of plan of care, the patient will be receiving concurrent chemotherapy in the [facility] ([facility]) from 8/29/22 - 10/10/22.  Patient uses nasal oxygen 24/7 related to diagnosis.  Patient states she has a local supplier for compressor and O2 tanks.  She agrees to consider staying at the [facility] due to  potential concurrent therapy side effects and oxygen dependency.  She agrees for RIAN Logan to inquire on her behalf  and understands they do not accept pets.  Patient agrees to a call back with additional information.

## 2022-08-24 NOTE — PROGRESS NOTES
On 8/24/22 Pt. And Pt. Significant other, Ramírez, met with LAUREN Hopkins and RIAN Logan in [facility] Consult Room #1. LAUREN Hopkins explained [facility] reimbursement trip log and encouraged pt. To set up an account with [facility].   RIAN Arora provided pt. With Release of Health information form and [facility] application. Pt. Filled out form and application during encounter and submitted to RIAN Arora.   Pt. Stated she plans on staying at [facility], has called regarding pet policy, and plans to make a reservation for stay during her treatment. RIAN Arora provided pt. With Lodging Referral Form.  Pt. Inquired about disability benefits. LAUREN Hopkins informed pt. That [facility] is out of the office, will contact pt. For assessment when returns and will be informed of disability benefit interest.   RIAN Arora stated she would follow-up with patient 8/25/22.  No further needs at this time.

## 2022-08-29 NOTE — PROGRESS NOTES
Oncology Nurse Navigator (ONN) Maite Logan reached out to patient with an update regarding financial and lodging assistance during upcoming XRT & OPIC treatments.  [facility] has availability and is able to provide kitchenette in the room for most, if not all of her stay.  Patient understands she may need to switch rooms infrequently during her 6 week stay to accommodate for the kitchenette.  Patient understands all the [facility] room are on the same floor and agrees to moving if needed.  Patient understands that the [place] [facility] will assist with lodging costs Sunday-Thursday evenings 8/29/22 - 10/9/22.  Patient agrees to cover the cost of staying the weekends instead of driving home each weekend.  Patient's small dog will remain at home. Tl and her significant support person have spoken with her oxygen company and have a plan.  All current questions and concerns have been addressed at this time and the patient is comfortable with the plan.  She has my contact information and agrees to call with any questions or concerns.    7.29.22 @ 0915.  RIAN Logan reached out to patient to confirm everything is on track for patient to arrive in [place] today to start her treatments.  Patient states she is driving and has my contact information.  RIAN Logan offered support for safe travel and encouraged patient to call with any questions or concerns during her journey or upon arrival at [facility].

## 2022-08-29 NOTE — PROGRESS NOTES
Chemotherapy Verification - SECONDARY RN   Cycle 1 Day 1    Height = 171 cm  Weight = 90.3 kg  BSA = 2.07 m^2       Medication: paclitaxel (TAXOL)  Dose: 45 mg/m2  Calculated Dose: 93.15 mg                             (In mg/m2, AUC, mg/kg)     Medication: carboplatin (PARAPLATIN)  Dose: target AUC 2  Calculated Dose: 233.78 mg                             (In mg/m2, AUC, mg/kg)    Carboplatin calculation (if applicable):  (2*(91.89+25)) = 233.78    I confirm that this process was performed independently.

## 2022-08-29 NOTE — PROGRESS NOTES
"Oncology Nurse Navigator (ONN) Maite Logan received a transfer from radiation oncology staff.  Patient Tammera \"Racquel\" Kaiden, a patient of Dr. Newell, requesting assistance with expenses related to 6 week Monday-Friday radiation therapy treatment plan here at [facility].  Patient lives in the [facility] & states financial barriers to care.  Patient agrees to a call back from RIAN Logan with support information.  RIAN Logan provided patient with my contact information & encouraged patient to call back with additional questions or concerns.      7.23.22 @ 8510.  RIAN Logan called back patient and reviewed lodging information and potential financial assistance from [facility] ([facility]).  RIAN Logan explained there is a lodging form and [facility] application/release of information (AARON) that are to be filled out and submitted to determine qualification for assistance.  Patient states she is coming to [place] 8//24/22 for an appointment at [facility] and agrees to meet RIAN Logan at [facility] ([facility]) to discuss assistance and receive forms.    "

## 2022-08-29 NOTE — PROGRESS NOTES
"Pharmacy Chemotherapy Calculations    Patient Name: Geena Richey  Dx: Lung cancer     Cycle 1, Day 1  Previous treatment = N/A    Regimen: Weekly PACLitaxel+CARBOplatin with concurrent radiation followed by PACLitaxel/CARBOplatin  PACLitaxel 45-50mg/m2 IV on Day 1   Followed by   CARBOplatin AUC 2 on Day 1   Weekly cycle for 7 weeks with radiation  Conditionally followed by   PACLitaxel 200mg/m2 on Day 1   Followed by   CARBOplatin AUC6 on Day 1   21 day cycle for 2 cycles starting 2-4 weeks after completion of concurrent chemoradiation   *Dosing Reference*  NCCN Guidelines for Non-Small Cell Lung Cancer. V. 3.2022.  Osito CP, et al. J Clin Oncol. 2005;23(25):5883-91.  Claudio ROBINSD, et al. Lancet Oncol. 2015;16(2):187-99.  Eri VW, et al. J Clin Oncol. 2007;25(3):313-8.     Allergies:  Patient has no known allergies.    BP (!) 96/72   Pulse 94   Temp 36.7 °C (98.1 °F) (Temporal)   Resp 20   Ht 1.71 m (5' 7.32\")   Wt 90.3 kg (199 lb 1.2 oz)   SpO2 96%   BMI 30.88 kg/m²  Body surface area is 2.07 meters squared.    Labs 8/29/22:  ANC~ 4850 Plt = 340k   Hgb = 15.8     SCr = 0.91mg/dL CrCl ~ 91.89mL/min   AST/ALT/ALP= 14/16/103 TBili = 0.7     PACLitaxel (Taxol) 45 mg/m² x 2.07 m² = 93.15 mg   <10% difference, okay to treat with final dose = 93.2 mg IV    CARBOplatin (Paraplatin) AUC 2 (92+25) = 234 mg   <10% difference, okay to treat with final dose = 234 mg IV    Makeda Ochoa, EveretteD,BCPS    "

## 2022-08-29 NOTE — PROGRESS NOTES
Pt presents to [facility] for C1D1 of Taxol/Carboplatin. Established PIV in R AC; brisk blood return observed and pt tolerated well. Labs drawn and within treatable parameters. Pre meds administered and Taxol titrated Q 30 minutes per protocol, some nausea after the first hour-PRN sublingual Zofran given-otherwise pt tolerated well. Report given to HATTIE Arias to finish remainder of patient's treatment.

## 2022-08-29 NOTE — PROGRESS NOTES
"Pharmacy Chemotherapy Verification Note:    Patient Name: Geena Richey      Dx: NSCLC      Protocol: Weekly CARBOplatin and PACLItaxel + XRT        *Dosing Reference*  PACLItaxel 45-mg/m2 IV over 60 min on Day 1 followed by  CARBOplatin AUC 2 IV over 30 min on Day 1   NCCN Guidelines for Non-Small Cell Lung Cancer V.3.2022.  Benny Mcneill al. J Clin Oncol. 2005 Sep 1;23(25):5229-84.  DEBBIE Snyder. et al. Standard-dose versus high-dose conformal radiotherapy with concurrent and consolidation carboplatin plus paclitaxel with or without cetuximab for patients with stage IIIA or IIIB non-small-cell lung cancer (RTOG 0617): a randomised, two-by-two factorial phase 3 study. Lancet Oncol. 2015 Feb;16(2):187-99.  Allergies:  Patient has no known allergies.     BP (!) 96/72   Pulse 94   Temp 36.7 °C (98.1 °F) (Temporal)   Resp 20   Ht 1.71 m (5' 7.32\")   Wt 90.3 kg (199 lb 1.2 oz)   SpO2 96%   BMI 30.88 kg/m²  Body surface area is 2.07 meters squared.    Labs 8/29/22:  ANC~ 4850 Plt = 340k   Hgb = 15.8     SCr = 0.91 mg/dL CrCl ~ 92 mL/min   AST/ALT/AP = 14/16/103 TBili = 0.7        Drug Order   (Drug name, dose, route, IV Fluid & volume, frequency, number of doses) Cycle: 1      Previous treatment: N/A     Medication = PACLItaxel  Base Dose = 45 mg/m²  Calc Dose: Base Dose x 2.07 m² = 93.15 mg  Final Dose = 93.2 mg  Route = IV  Fluid & Volume =  mL  Admin Duration = Over 60 min           < 10 % difference, ok to treat with final dose     Medication = CARBOplatin  Base Dose = AUC 2    Calc Dose: Base Dose x (25 + 92 ml/min) = 234 mg  Final Dose = 234 mg  Route = IV  Fluid & Volume =  mL  Admin Duration = Over 30 min           < 10 % difference, ok to treat with final dose       By my signature below, I confirm this process was performed independently with the BSA and all final chemotherapy dosing calculations congruent. I have reviewed the above chemotherapy order and that my calculation of the " final dose and BSA (when applicable) corroborate those calculations of the  pharmacist.     Camille Barron, PharmD, BCOP

## 2022-08-29 NOTE — PROGRESS NOTES
Chemotherapy Verification - PRIMARY RN    C1 D1    Height = 171cm  Weight = 90.3kg  BSA = 2.07m^2       Medication: paclitaxel (Taxol)  Dose: 45mg/m^2  Calculated Dose: 93.15mg                             (In mg/m2, AUC, mg/kg)     Medication: carboplatin (Paraplatin)  Dose: AUC=2  Calculated Dose: 233.778mg                             (In mg/m2, AUC, mg/kg)    Carboplatin calculation (if applicable):  (2*(91.889+25)) = 233.778mg      I confirm this process was performed independently with the BSA and all final chemotherapy dosing calculations congruent.  Any discrepancies of 10% or greater have been addressed with the chemotherapy pharmacist. The resolution of the discrepancy has been documented in the EPIC progress notes.

## 2022-08-30 NOTE — PROGRESS NOTES
Pt tolerated remainder of treatment today without s/s adverse reaction. PIV dc'd catheter tip intact, gauze and coban dressing applied. Pt discharged to self care, NAD. Pt's next appt confirmed 9/5/2022.

## 2022-08-31 NOTE — ON TREATMENT VISIT
ON TREATMENT  NOTE  RADIATION ONCOLOGY DEPARTMENT    Patient name:  Geena Richey    Primary Physician:  Pcp Pt States None MRN: [id number]  CSN: [id number]   Referring physician:  No ref. provider found : 1960, 62 y.o.     ENCOUNTER DATE:  22    DIAGNOSIS:    Primary lung squamous cell carcinoma, left (HCC)  Staging form: Lung, AJCC 8th Edition  - Clinical: Stage IIB (cT2b, cN1, cM0) - Signed by Niocle Newell M.D. on 2022  Histologic grade (G): G2  Histologic grading system: 4 grade system      TREATMENT SUMMARY:  Aria Treatment Information          Some values may be hidden. Unless noted otherwise, only the newest values recorded on each date are displayed.           Aria Treatment Summary 22   Course First Treatment Date 2022   Course Last Treatment Date 2022   L Lung,Hilum Plan from Course C2_L_lung/med   Fraction 3 of 30   Elapsed Course Days 2 @    Prescribed Fraction Dose 200 cGy   Prescribed Total Dose 6,000 cGy   L Lung,Hilum Reference Point from Course C2_L_lung/med   Elapsed Course Days 2 @    Session Dose 200 cGy   Total Dose 600 cGy   L Lung,Hilum CP Reference Point from Course C2_L_lung/med   Elapsed Course Days 2 @    Session Dose 210 cGy   Total Dose 630 cGy   2ndCheckPlan Plan from Course C1   Cylinder Plan from Course C1   Cylinder+.5 Reference Point from Course C1   calc pt Reference Point from Course C1               SUBJECTIVE:  Tolerating therapy without event        VITAL SIGNS:  KPS: 90, Able to carry on normal activity; minor signs or symptoms of disease (ECOG equivalent 0)  Encounter Vitals  Blood Pressure: 132/80  Pulse: 73  Pulse Oximetry: 91 %  No flowsheet data found.       PHYSICAL EXAM:  No change      Toxicity Assessment 2022   Toxicity Assessment Chest   Fatigue (lethargy, malaise, asthenia) Moderate (e.g., decrease in performance status by 1 ECOG level or 20% Karnofsky) or causing difficulty  performing some activities   Radiation Dermatitis None   Anorexia Loss of appetite   Dyspepsia/heartburn None   Dysphagia, Esophagitis, odynophagoa (painful swallowing) None   RT Dysphagia-Esophageal None   Cough Mild, relieved by non-prescription medication   Dyspnea Dyspnea on exertion           IMPRESSION:  Cancer Staging  Primary lung squamous cell carcinoma, left (HCC)  Staging form: Lung, AJCC 8th Edition  - Clinical: Stage IIB (cT2b, cN1, cM0) - Signed by Nicole Newell M.D. on 8/16/2022      PLAN:  No change in treatment plan    Disposition:  Treatment plan reviewed. Questions answered. Continue therapy outlined.     Nicole Newell M.D.    No orders of the defined types were placed in this encounter.

## 2022-09-01 NOTE — PROGRESS NOTES
On September 1, 2022, Oncology Social Worker Nighat Brown contacted pt. via telephone after receiving notification that pt. would like to speak to me regarding disability.  Pt. shared she wanted information regarding how to apply for disability as she has been told she will likely not be able to go back to work.  MOE Brown spoke to pt. about applying for disability through the Social Security Administration.      MOE Brown informed she would leave this information at Radiation Oncology  for pt. to .  Pt. agreed to  information.         MOE Brown left the following information printed for pt.      How to Apply for Disability in [place]  Online    You can apply online at https://www.ssa.gov  Telephone   You can apply over the telephone by calling the [facility]'s toll free customer service line at [phone] (TTY [phone])  In person   You can apply in person at your local Social Security field office    [facility] [facility] [address] [place] [address] [phone]

## 2022-09-01 NOTE — TELEPHONE ENCOUNTER
9-01-22 1st NO SHOW  Date of No Show: 8/31/22  Provider: Dr. Cruz  Reason For Visit: HFV/[facility], 8/8/22-CURRENT(8/11/22), [facility] CONSULT/DX:LUNG MASS/   Outcome of call: Pt declined to reschedule at time of call, Left call back for scheduling [phone].    Reason Missed: Pt said she had called to cancel due to traffic coming from [place]. There was no note or canceled appt in appt desk, she does not remember who she spoke to.

## 2022-09-01 NOTE — PROGRESS NOTES
walk-in, pt. requesting to meet with OSW regarding disability. I let pt. know that I would route message to her to schedule a time to meet. No further needs at this time.

## 2022-09-04 NOTE — PROGRESS NOTES
"Pharmacy Chemotherapy Calculations    Patient Name: Geena Richey  Dx: Lung cancer     Cycle 2  Previous treatment = C1 on 8/29/22    Regimen: Weekly PACLitaxel+CARBOplatin with concurrent radiation followed by PACLitaxel/CARBOplatin  PACLitaxel 45-50mg/m2 IV on Day 1   Followed by   CARBOplatin AUC 2 on Day 1   Weekly cycle for 7 weeks with radiation  Conditionally followed by   PACLitaxel 200mg/m2 on Day 1   Followed by   CARBOplatin AUC6 on Day 1   21 day cycle for 2 cycles starting 2-4 weeks after completion of concurrent chemoradiation   *Dosing Reference*  NCCN Guidelines for Non-Small Cell Lung Cancer. V. 3.2022.  Osito CP, et al. J Clin Oncol. 2005;23(25):5883-91.  Claudio DEBBIE, et al. Lancet Oncol. 2015;16(2):187-99.  Eri VW, et al. J Clin Oncol. 2007;25(3):313-8.     Allergies:  Patient has no known allergies.    /61   Pulse 80   Temp 35.9 °C (96.7 °F) (Temporal)   Resp 18   Ht 1.71 m (5' 7.32\")   Wt 91.5 kg (201 lb 11.5 oz)   SpO2 97%   BMI 31.29 kg/m²  Body surface area is 2.08 meters squared.    Labs 9/5/22:  ANC~ 3590 Plt = 327k   Hgb = 15.1     SCr = 0.81 mg/dL CrCl ~ 105 mL/min     PACLitaxel (Taxol) 45 mg/m² x 2.08 m² = 93.6 mg   <10% difference, okay to treat with final dose = 93.6 mg IV    CARBOplatin (Paraplatin) AUC 2 (105 mL/min + 25) = 260 mg   <10% difference, okay to treat with final dose = 260 mg IV    Lorrie Lazaro, PharmD    "

## 2022-09-05 NOTE — PROGRESS NOTES
Chemotherapy Verification - SECONDARY RN       Height = 1.71m  Weight = 91.5kg  BSA = 2.08m2       Medication: paclitaxel  Dose: 45mg/m2  Calculated Dose: 93.6mg                             (In mg/m2, AUC, mg/kg)     Medication: carboplatin  Dose: AUC 2  Calculated Dose: 259mg                             (In mg/m2, AUC, mg/kg)      Carboplatin calculation (if applicable):  (2*(104.5+25)) = 259    I confirm that this process was performed independently.

## 2022-09-05 NOTE — PROGRESS NOTES
Pt arrived ambulatory to [facility] for D1C2 Taxol/Carbo chemotherapy. POC discussed with pt and she agrees with plan. Pt with call light in reach for safety. Pt verbalized understanding to call for needs/assist.    Pt c/o pain in LAC. Pt states her PIV was placed there last week and she started having pain yesterday. No redness, no bruising noted. There is a marble size lump in the LAC. Warm compress provided for pt comfort.     PIV established, brisk blood return noted. Labs drawn as ordered. Results reviewed, ok to proceed with treatment. Pre-meds initiated. PIV will not flush, no s/s infiltration. Connections are secure, clamps are open. PIV still will not flush. PIV dc'd, catheter tip intact, gauze and coban dressing applied. New PIV site obtained Left hand, brisk blood return, flushed easily with NS.     Pt medicated per MAR with pre-meds. Taxol infusion without difficulty. Taxol titrated slowly as this is pt's second taxol infusion. Carbo infused without issue. Pt tolerated treatment without s/s adverse reaction. PIV dc'd catheter tip intact, gauze and coban dressing applied. Pt discharged to self care, [facility]. Pt's next appt confirmed 9/12/2022.

## 2022-09-05 NOTE — PROGRESS NOTES
"Pharmacy Chemotherapy Verification Note:    Patient Name: Geena Richey      Dx: NSCLC      Protocol: Weekly CARBOplatin and PACLItaxel + XRT        *Dosing Reference*  PACLItaxel 45-mg/m2 IV over 60 min on Day 1 followed by  CARBOplatin AUC 2 IV over 30 min on Day 1   Repeat weekly x 7 cycles   NCCN Guidelines for Non-Small Cell Lung Cancer V.3.2022.  SKIP Mcneill et al. J Clin Oncol. 2005 Sep 1;23(25):4607-91.  YING Snyder et al. Standard-dose versus high-dose conformal radiotherapy with concurrent and consolidation carboplatin plus paclitaxel with or without cetuximab for patients with stage IIIA or IIIB non-small-cell lung cancer (RTOG 0617): a randomised, two-by-two factorial phase 3 study. Lancet Oncol. 2015 Feb;16(2):187-99.    Allergies:  Patient has no known allergies.     /61   Pulse 80   Temp 35.9 °C (96.7 °F) (Temporal)   Resp 18   Ht 1.71 m (5' 7.32\")   Wt 91.5 kg (201 lb 11.5 oz)   SpO2 97%   BMI 31.29 kg/m²  Body surface area is 2.08 meters squared.    Labs 9/5/22:  ANC~ 3590 Plt = 327k   Hgb = 15.1     SCr = 0.81 mg/dL CrCl ~ 105 mL/min   AST/ALT/AP = 14/16/103 TBili = 0.7        Drug Order   (Drug name, dose, route, IV Fluid & volume, frequency, number of doses) Cycle: 2    Previous treatment: C1 on 8/29/22     Medication = PACLItaxel  Base Dose = 45 mg/m²  Calc Dose: Base Dose x 2.08 m² = 93.6 mg  Final Dose = 93.6 mg  Route = IV  Fluid & Volume =  mL  Admin Duration = Over 60 min           < 10 % difference, ok to treat with final dose     Medication = CARBOplatin  Base Dose = AUC 2    Calc Dose: Base Dose x (25 + 105 ml/min) = 260 mg  Final Dose = 260 mg  Route = IV  Fluid & Volume =  mL  Admin Duration = Over 30 min           < 10 % difference, ok to treat with final dose       By my signature below, I confirm this process was performed independently with the BSA and all final chemotherapy dosing calculations congruent. I have reviewed the above chemotherapy order " and that my calculation of the final dose and BSA (when applicable) corroborate those calculations of the  pharmacist.     Camille Barron, PharmD, BCOP

## 2022-09-05 NOTE — PROGRESS NOTES
Chemotherapy Verification - PRIMARY RN    D1C2    Height = 171cm  Weight = 91.5kg  BSA = 2.08m^2       Medication: PACLitaxel (TAXOL)  Dose: 45mg/m^2  Calculated Dose: 93.6mg                                Medication: CARBOplatin (PARAPLATIN)  Dose: AUC 2  Calculated Dose: 259.16mg                            (In mg/m2, AUC, mg/kg)          Carboplatin calculation (if applicable):  (2*(104.58+25)) = 259.16      I confirm this process was performed independently with the BSA and all final chemotherapy dosing calculations congruent.  Any discrepancies of 10% or greater have been addressed with the chemotherapy pharmacist. The resolution of the discrepancy has been documented in the EPIC progress notes.

## 2022-09-06 NOTE — RADIATION COMPLETION NOTES
DATE OF SERVICE: 9/6/2022    DIAGNOSIS:  Primary lung squamous cell carcinoma, left (HCC)  Staging form: Lung, AJCC 8th Edition  - Clinical: Stage IIB (cT2b, cN1, cM0) - Signed by Nicole Newell M.D. on 8/16/2022  Histologic grade (G): G2  Histologic grading system: 4 grade system       DATE OF SERVICE: 9/6/2022    TYPE OF SIMULATION: Thorax    GOAL OF TREATMENT:   [x] Curative  [] Palliative  [] Oligometastatic    CONTRAST:    [] IV Contrast*  [] Oral Contrast                POSITION:    [x]  Supine  [] Prone     COMPLEX:  [x] Complex Blocking   []Arcs  [] Custom Blocks  [] >3 Sites    PROCEDURE: Patient positioned on CT table in Vac-Alicia immobilization device. CT acquired thorough the entire volume of interest.  Images reviewed and exported to treatment planning system.    I have personally reviewed the relevant data, performed the target localization, and determined all relevant factors for this patient’s simulation.    *Omnipaque 80 -100cc IVP in conjunction with 500cc NS

## 2022-09-07 NOTE — ON TREATMENT VISIT
ON TREATMENT  NOTE  RADIATION ONCOLOGY DEPARTMENT    Patient name:  Geena Richey    Primary Physician:  Pcp Pt States None MRN: [id number]  CSN: [id number]   Referring physician:  No ref. provider found : 1960, 62 y.o.     ENCOUNTER DATE:  22    DIAGNOSIS:    Primary lung squamous cell carcinoma, left (HCC)  Staging form: Lung, AJCC 8th Edition  - Clinical: Stage IIB (cT2b, cN1, cM0) - Signed by Nicole Newell M.D. on 2022  Histologic grade (G): G2  Histologic grading system: 4 grade system      TREATMENT SUMMARY:  Aria Treatment Information          Some values may be hidden. Unless noted otherwise, only the newest values recorded on each date are displayed.           Aria Treatment Summary 22   Course First Treatment Date 2022   Course Last Treatment Date 2022   L Lung,Hilum Plan from Course C2_L_lung/med   Fraction 7 of 30   Elapsed Course Days 9 @    Prescribed Fraction Dose 200 cGy   Prescribed Total Dose 6,000 cGy   L Lung,Hilum Reference Point from Course C2_L_lung/med   Elapsed Course Days 9 @    Session Dose 200 cGy   Total Dose 1,400 cGy   L Lung,Hilum CP Reference Point from Course C2_L_lung/med   Elapsed Course Days 9 @    Session Dose 210 cGy   Total Dose 1,470 cGy   2ndCheckPlan Plan from Course C1   Cylinder Plan from Course C1   Cylinder+.5 Reference Point from Course C1   calc pt Reference Point from Course C1               SUBJECTIVE:  Tolerating therapy without event.  Mr. PET CT scan scheduled for last week hopefully that will be soon.        VITAL SIGNS:  KPS: 100, Fully active, able to carry on all pre-disease performed without restriction (ECOG equivalent 0)  Encounter Vitals  Pulse: (!) 58  Pulse Oximetry: 94 %  No flowsheet data found.       PHYSICAL EXAM:  No change      Toxicity Assessment 2022   Toxicity Assessment Chest Chest   Fatigue (lethargy, malaise, asthenia) Increased fatigue over  baseline, but not altering normal activities Moderate (e.g., decrease in performance status by 1 ECOG level or 20% Karnofsky) or causing difficulty performing some activities   Radiation Dermatitis None None   Anorexia None Loss of appetite   Dyspepsia/heartburn None None   Dysphagia, Esophagitis, odynophagoa (painful swallowing) None None   RT Dysphagia-Esophageal None None   Cough Mild, relieved by non-prescription medication Mild, relieved by non-prescription medication   Dyspnea Dyspnea on exertion Dyspnea on exertion           IMPRESSION:  Cancer Staging  Primary lung squamous cell carcinoma, left (HCC)  Staging form: Lung, AJCC 8th Edition  - Clinical: Stage IIB (cT2b, cN1, cM0) - Signed by Nicole Newell M.D. on 8/16/2022      PLAN:  No change in treatment plan    Disposition:  Treatment plan reviewed. Questions answered. Continue therapy outlined.     Nicole Newell M.D.    No orders of the defined types were placed in this encounter.

## 2022-09-07 NOTE — PROGRESS NOTES
Follow up call placed to patient to see how she was doing.  Pt reporting treatment is going ok, but is more tired. Pt reporting she is eating ok and probably does not drink as much water as she should, when asked.  Pt reporting drinks 3-4 glasses of water a day.  Encouraged her to increase by a few glasses to improve hydration.  Also encouraged pt to make sure she is getting up and moving around during the day.  Pt reporting she is.  Pt denies other questions or needs from navigator at this time.  Encouraged her to call if questions or barriers to care come up.  Contact information provided.

## 2022-09-12 NOTE — PROGRESS NOTES
Chemotherapy Verification - PRIMARY RN  Cycle 3 Day 1    Height = 169 cm  Weight = 92.5 kg  BSA = 2.08 m^2       Medication: paclitaxel (TAXOL)  Dose: 45 mg/m2  Calculated Dose: 93.6 mg                             (In mg/m2, AUC, mg/kg)     Medication: carboplatin (PARAPLATIN)  Dose: target AUC = 2  Calculated Dose: 275.3 mg                             (In mg/m2, AUC, mg/kg)        Carboplatin calculation (if applicable):  (2*(112.651+25)) = 275.302      I confirm this process was performed independently with the BSA and all final chemotherapy dosing calculations congruent.  Any discrepancies of 10% or greater have been addressed with the chemotherapy pharmacist. The resolution of the discrepancy has been documented in the EPIC progress notes.

## 2022-09-12 NOTE — PROGRESS NOTES
"Pharmacy Chemotherapy Calculations    Patient Name: Geena Richey  Dx: Lung cancer     Cycle 3  Previous treatment = C2 on 9/5/2022    Regimen: Weekly PACLitaxel+CARBOplatin with concurrent radiation followed by PACLitaxel/CARBOplatin  PACLitaxel 45-50mg/m2 IV on Day 1   Followed by   CARBOplatin AUC 2 on Day 1   Weekly cycle for 7 weeks with radiation  Conditionally followed by   PACLitaxel 200mg/m2 on Day 1   Followed by   CARBOplatin AUC6 on Day 1   21 day cycle for 2 cycles starting 2-4 weeks after completion of concurrent chemoradiation   *Dosing Reference*  NCCN Guidelines for Non-Small Cell Lung Cancer. V. 3.2022.  Osito PENALOZA, et al. J Clin Oncol. 2005;23(25):5883-91.  Claudio ROBINSD, et al. Lancet Oncol. 2015;16(2):187-99.  Eri VW, et al. J Clin Oncol. 2007;25(3):313-8.     Allergies:  Patient has no known allergies.    /74   Pulse 96   Temp 36.4 °C (97.5 °F) (Temporal)   Resp 18   Ht 1.69 m (5' 6.54\")   Wt 92.5 kg (203 lb 14.8 oz)   SpO2 91%   BMI 32.39 kg/m²  Body surface area is 2.08 meters squared.    Labs 9/12/22:  ANC~ 2860 Plt = 247k   Hgb = 13.2     SCr = 0.76mg/dL CrCl ~ 112.65mL/min   AST/ALT/ALP = 11/9/74 TBili = 0.7       PACLitaxel (Taxol) 45 mg/m² x 2.08 m² = 93.6 mg   <10% difference, okay to treat with final dose = 93.6 mg IV    CARBOplatin (Paraplatin) AUC 2 (113mL/min + 25) = 276mg   <10% difference, okay to treat with final dose = 280mg IV    Makeda Ochoa, PharmD, BCPS      "

## 2022-09-12 NOTE — PROGRESS NOTES
"Pharmacy Chemotherapy Verification Note:    Patient Name: Geena Richey      Dx: NSCLC      Protocol: Weekly CARBOplatin and PACLItaxel + XRT        *Dosing Reference*  PACLItaxel 45-mg/m2 IV over 60 min on Day 1 followed by  CARBOplatin AUC 2 IV over 30 min on Day 1   Repeat weekly x 7 cycles   NCCN Guidelines for Non-Small Cell Lung Cancer V.3.2022.  SKIP Mcneill et al. J Clin Oncol. 2005 Sep 1;23(25):8125-91.  YING Snyder et al. Standard-dose versus high-dose conformal radiotherapy with concurrent and consolidation carboplatin plus paclitaxel with or without cetuximab for patients with stage IIIA or IIIB non-small-cell lung cancer (RTOG 0617): a randomised, two-by-two factorial phase 3 study. Lancet Oncol. 2015 Feb;16(2):187-99.    Allergies:  Patient has no known allergies.     /74   Pulse 96   Temp 36.4 °C (97.5 °F) (Temporal)   Resp 18   Ht 1.69 m (5' 6.54\")   Wt 92.5 kg (203 lb 14.8 oz)   SpO2 91%   BMI 32.39 kg/m²  Body surface area is 2.08 meters squared.    Labs 9/12/22:  ANC~ 2860 Plt = 247k   Hgb = 13.2     SCr = 0.76 mg/dL CrCl ~ 113 mL/min   AST/ALT/AP = 11/9/74 TBili = 0.7       Drug Order   (Drug name, dose, route, IV Fluid & volume, frequency, number of doses) Cycle: 3    Previous treatment: C2 on 9/5/22     Medication = PACLItaxel  Base Dose = 45 mg/m²  Calc Dose: Base Dose x 2.08 m² = 93.6 mg  Final Dose = 93.6 mg  Route = IV  Fluid & Volume =  mL  Admin Duration = Over 60 min           < 10 % difference, ok to treat with final dose     Medication = CARBOplatin  Base Dose = AUC 2    Calc Dose: Base Dose x (25 + 113 ml/min) = 276 mg  Final Dose = 280 mg  Route = IV  Fluid & Volume =  mL  Admin Duration = Over 30 min           < 10 % difference, ok to treat with final dose       By my signature below, I confirm this process was performed independently with the BSA and all final chemotherapy dosing calculations congruent. I have reviewed the above chemotherapy order and " that my calculation of the final dose and BSA (when applicable) corroborate those calculations of the  pharmacist.     Camille Barron, PharmD, BCOP

## 2022-09-12 NOTE — PROGRESS NOTES
Chemotherapy Verification - SECONDARY RN       Height = 169 cm  Weight = 92.5 kg  BSA = 2.08       Medication: Taxol  Dose: 45mg.m2  Calculated Dose: 93.6 mg                             (In mg/m2, AUC, mg/kg)     Medication: Carboplatin  Dose: AUC 2  Calculated Dose: 284 mg                             (In mg/m2, AUC, mg/kg)      I confirm that this process was performed independently.

## 2022-09-13 NOTE — PROGRESS NOTES
Racquel arrives to [facility] for C3D1 Taxol/Carbo for lung cancer. Patient denies acute health concerns. Denies s/s active infections, open wounds, and mouth sores. Patient continues to have dyspnea both at rest and upon exertion. 22g PIV placed to lower R-FA, which flushes easily and has brisk blood return. Labs drawn, reviewed, and meets parameters to proceed with treatment. Premedications administered. Taxol infused, with NON-DEHP tubing and an inline 0.2 micron filter, over 1 hour without adverse s/s. Carboplatin infused over 30 mins without adverse s/s. Patient tolerated both treatments well. PIV with brisk blood return post-treatments, flushed and removed with tip intact. Patient has her next appointment. Discharged home to self care in no apparent distress.

## 2022-09-14 NOTE — ON TREATMENT VISIT
ON TREATMENT  NOTE  RADIATION ONCOLOGY DEPARTMENT    Patient name:  Geena Richey    Primary Physician:  Pcp Pt States None MRN: [id number]  CSN: [id number]   Referring physician:  No ref. provider found : 1960, 62 y.o.     ENCOUNTER DATE:  22    DIAGNOSIS:    Primary lung squamous cell carcinoma, left (HCC)  Staging form: Lung, AJCC 8th Edition  - Clinical: Stage IIB (cT2b, cN1, cM0) - Signed by Nicole Newell M.D. on 2022  Histologic grade (G): G2  Histologic grading system: 4 grade system      TREATMENT SUMMARY:  Aria Treatment Information          Some values may be hidden. Unless noted otherwise, only the newest values recorded on each date are displayed.           Aria Treatment Summary 22   Course First Treatment Date 2022   Course Last Treatment Date 2022   L Lung,Hilum Plan from Course C2_L_lung/med   Fraction 11 of 30   Elapsed Course Days 15 @ [id number]   Prescribed Fraction Dose 200 cGy   Prescribed Total Dose 6,000 cGy   L Lung,Hilum Reference Point from Course C2_L_lung/med   Elapsed Course Days 15 @ [id number]   Session Dose 200 cGy   Total Dose 2,200 cGy   L Lung,Hilum CP Reference Point from Course C2_L_lung/med   Elapsed Course Days 15 @    Session Dose 210 cGy   Total Dose 2,310 cGy   2ndCheckPlan Plan from Course C1   Cylinder Plan from Course C1   Cylinder+.5 Reference Point from Course C1   calc pt Reference Point from Course C1               SUBJECTIVE:  Short of breath has a little bit of a cough.Could be from the smoke outside.        VITAL SIGNS:  KPS: 90, Able to carry on normal activity; minor signs or symptoms of disease (ECOG equivalent 0)  Encounter Vitals  Blood Pressure: (!) 154/106  Pulse: (!) 55  Pulse Oximetry: 97 %  No flowsheet data found.       PHYSICAL EXAM:  No change      Toxicity Assessment 2022   Toxicity Assessment Chest Chest Chest   Fatigue (lethargy, malaise, asthenia) Moderate (e.g.,  decrease in performance status by 1 ECOG level or 20% Karnofsky) or causing difficulty performing some activities Increased fatigue over baseline, but not altering normal activities Moderate (e.g., decrease in performance status by 1 ECOG level or 20% Karnofsky) or causing difficulty performing some activities   Radiation Dermatitis None None None   Anorexia None None Loss of appetite   Dyspepsia/heartburn Severe None None   Dysphagia, Esophagitis, odynophagoa (painful swallowing) Mild dysphagia, but can eat regular diet None None   RT Dysphagia-Esophageal None None None   Cough Absent Mild, relieved by non-prescription medication Mild, relieved by non-prescription medication   Dyspnea Normal Dyspnea on exertion Dyspnea on exertion           IMPRESSION:  Cancer Staging  Primary lung squamous cell carcinoma, left (HCC)  Staging form: Lung, AJCC 8th Edition  - Clinical: Stage IIB (cT2b, cN1, cM0) - Signed by Nicole Newell M.D. on 8/16/2022      PLAN:  No change in treatment plan    Disposition:  Treatment plan reviewed. Questions answered. Continue therapy outlined.     Nicole Newell M.D.    No orders of the defined types were placed in this encounter.

## 2022-09-15 NOTE — PROGRESS NOTES
On 9/15/22 pt. Sister Aydee walk-in to office, inquiring about patient being connected to support services for disability. Provided Aydee with my card for contact information and redirected her to have pt. Call me with questions and concerns. No further needs at this time.   Routed encounter to OSW [place].

## 2022-09-18 NOTE — PROGRESS NOTES
"Pharmacy Chemotherapy Calculations    Patient Name: Geena Richey  Dx: Lung cancer     Cycle 4  Previous treatment = C3 on 9/12/22    Regimen: Weekly PACLitaxel+CARBOplatin with concurrent radiation followed by PACLitaxel/CARBOplatin  PACLitaxel 45-50mg/m2 IV on Day 1   Followed by   CARBOplatin AUC 2 on Day 1   Weekly cycle for 7 weeks with radiation  Conditionally followed by   PACLitaxel 200mg/m2 on Day 1   Followed by   CARBOplatin AUC6 on Day 1   21 day cycle for 2 cycles starting 2-4 weeks after completion of concurrent chemoradiation   *Dosing Reference*  NCCN Guidelines for Non-Small Cell Lung Cancer. V. 3.2022.  Osito CP, et al. J Clin Oncol. 2005;23(25):5883-91.  Claudio DEBBIE, et al. Lancet Oncol. 2015;16(2):187-99.  Eri VW, et al. J Clin Oncol. 2007;25(3):313-8.     Allergies:  Patient has no known allergies.    /71   Pulse 71   Temp 36.7 °C (98.1 °F) (Temporal)   Resp 18   Ht 1.68 m (5' 6.14\")   Wt 93.3 kg (205 lb 11 oz)   BMI 33.06 kg/m²  Body surface area is 2.09 meters squared.    Labs 9/19/22:  ANC~ 2500 Plt = 148k   Hgb = 11.5     SCr = 0.65 mg/dL CrCl ~ 124 mL/min (Minimum SCr of 0.7 used)    PACLitaxel (Taxol) 45 mg/m² x 2.09 m² = 94.05 mg   <10% difference, okay to treat with final dose = 94.1 mg IV    CARBOplatin (Paraplatin) AUC 2 (124 mL/min + 25) = 298 mg   <10% difference, okay to treat with final dose = 298 mg IV    Lorrie Lazaro, PharmD        "

## 2022-09-19 NOTE — PROGRESS NOTES
"Pharmacy Chemotherapy Verification Note:    Patient Name: Geena Richey      Dx: NSCLC      Protocol: Weekly CARBOplatin and PACLItaxel + XRT        *Dosing Reference*  PACLItaxel 45-mg/m2 IV over 60 min on Day 1 followed by  CARBOplatin AUC 2 IV over 30 min on Day 1   Repeat weekly x 7 cycles   NCCN Guidelines for Non-Small Cell Lung Cancer V.3.2022.  Benny Mcneill al. J Clin Oncol. 2005 Sep 1;23(25):3634-91.  YING Snyder et al. Standard-dose versus high-dose conformal radiotherapy with concurrent and consolidation carboplatin plus paclitaxel with or without cetuximab for patients with stage IIIA or IIIB non-small-cell lung cancer (RTOG 0617): a randomised, two-by-two factorial phase 3 study. Lancet Oncol. 2015 Feb;16(2):187-99.    Allergies:  Patient has no known allergies.     /71   Pulse 71   Temp 36.7 °C (98.1 °F) (Temporal)   Resp 18   Ht 1.68 m (5' 6.14\")   Wt 93.3 kg (205 lb 11 oz)   BMI 33.06 kg/m²  Body surface area is 2.09 meters squared.    Labs 9/19/22:  ANC~ 2500 Plt = 148k   Hgb = 11.5     SCr = 0.65 mg/dL CrCl ~ 123 mL/min (min Scr 0.7 used)    Labs 9/12/22:  AST/ALT/AP = 11/9/74 TBili = 0.7       Drug Order   (Drug name, dose, route, IV Fluid & volume, frequency, number of doses) Cycle: 4    Previous treatment: C3 on 9/12/22     Medication = PACLItaxel  Base Dose = 45 mg/m²  Calc Dose: Base Dose x 2.09 m² = 94.1 mg  Final Dose = 94.1 mg  Route = IV  Fluid & Volume =  mL  Admin Duration = Over 60 min           < 10 % difference, ok to treat with final dose     Medication = CARBOplatin  Base Dose = AUC 2    Calc Dose: Base Dose x (25 + 123 ml/min) = 296 mg  Final Dose = 298 mg  Route = IV  Fluid & Volume =  mL  Admin Duration = Over 30 min           < 10 % difference, ok to treat with final dose       By my signature below, I confirm this process was performed independently with the BSA and all final chemotherapy dosing calculations congruent. I have reviewed the above " chemotherapy order and that my calculation of the final dose and BSA (when applicable) corroborate those calculations of the  pharmacist.     Camille Barron, PharmD, BCOP

## 2022-09-19 NOTE — ON TREATMENT VISIT
ON TREATMENT  NOTE  RADIATION ONCOLOGY DEPARTMENT    Patient name:  Geena Richey    Primary Physician:  Pcp Pt States None MRN: [id number]  CSN: [id number]   Referring physician:  No ref. provider found : 1960, 62 y.o.     ENCOUNTER DATE:  22    DIAGNOSIS:    Primary lung squamous cell carcinoma, left (HCC)  Staging form: Lung, AJCC 8th Edition  - Clinical: Stage IIB (cT2b, cN1, cM0) - Signed by Nicole Newell M.D. on 2022  Histologic grade (G): G2  Histologic grading system: 4 grade system      TREATMENT SUMMARY:  Aria Treatment Information          Some values may be hidden. Unless noted otherwise, only the newest values recorded on each date are displayed.           Aria Treatment Summary 22   Course First Treatment Date 2022   Course Last Treatment Date 2022   L Lung,Hilum Plan from Course C2_L_lung/med   Fraction 14 of 30   Elapsed Course Days 18 @    Prescribed Fraction Dose 200 cGy   Prescribed Total Dose 6,000 cGy   L Lung,Hilum Reference Point from Course C2_L_lung/med   Elapsed Course Days 18 @ [id number]   Session Dose 200 cGy   Total Dose 2,800 cGy   L Lung,Hilum CP Reference Point from Course C2_L_lung/med   Elapsed Course Days  @    Session Dose 210 cGy   Total Dose 2,939 cGy   2ndCheckPlan Plan from Course C1   Cylinder Plan from Course C1   Cylinder+.5 Reference Point from Course C1   calc pt Reference Point from Course C1               SUBJECTIVE:  Patient having a lot of pain in her right arm and right chest wall.  She was not able to finish her treatment this morning.  She is going to come back later after she is taken some medication.  She has chemotherapy today.        VITAL SIGNS:  KPS: 80, Normal activity with effort; some signs or symptoms of disease (ECOG equivalent 1)     No flowsheet data found.       PHYSICAL EXAM:  Pain in right chest wall and right shoulder when moving her arm up      Toxicity Assessment 2022  9/7/2022 8/31/2022   Toxicity Assessment Chest Chest Chest   Fatigue (lethargy, malaise, asthenia) Moderate (e.g., decrease in performance status by 1 ECOG level or 20% Karnofsky) or causing difficulty performing some activities Increased fatigue over baseline, but not altering normal activities Moderate (e.g., decrease in performance status by 1 ECOG level or 20% Karnofsky) or causing difficulty performing some activities   Radiation Dermatitis None None None   Anorexia None None Loss of appetite   Dyspepsia/heartburn Severe None None   Dysphagia, Esophagitis, odynophagoa (painful swallowing) Mild dysphagia, but can eat regular diet None None   RT Dysphagia-Esophageal None None None   Cough Absent Mild, relieved by non-prescription medication Mild, relieved by non-prescription medication   Dyspnea Normal Dyspnea on exertion Dyspnea on exertion           IMPRESSION:  Cancer Staging  Primary lung squamous cell carcinoma, left (HCC)  Staging form: Lung, AJCC 8th Edition  - Clinical: Stage IIB (cT2b, cN1, cM0) - Signed by Nicole Newell M.D. on 8/16/2022      PLAN:  No change in treatment plan.  We are going to finish her treatment after her IV get started for chemotherapy.  She knows to premedicate with pain medication before coming in for treatments.    Disposition:  Treatment plan reviewed. Questions answered. Continue therapy outlined.     Nicole Newell M.D.    No orders of the defined types were placed in this encounter.

## 2022-09-19 NOTE — PROGRESS NOTES
Chemotherapy Verification - PRIMARY RN      Height = 1.68 m  Weight = 93.3 kg  BSA = 2.09 m2 m2       Medication: PAClitaxel (Taxol)  Dose: 45mg/m2  Calculated Dose: 94 mg                             (In mg/m2, AUC, mg/kg)     Medication: Carboplatin (Paraplatin)  Dose: AUC 2  Calculated Dose: 296 mg                             (In mg/m2, AUC, mg/kg)                                (In mg/m2, AUC, mg/kg)      Carboplatin calculation (if applicable):  (2*(123.335+25)) = 296 mg      I confirm this process was performed independently with the BSA and all final chemotherapy dosing calculations congruent.  Any discrepancies of 10% or greater have been addressed with the chemotherapy pharmacist. The resolution of the discrepancy has been documented in the EPIC progress notes.

## 2022-09-19 NOTE — PROGRESS NOTES
Chemotherapy Verification - SECONDARY RN       Height = 168 cm  Weight = 93.3 kg  BSA = 2.09 m2       Medication: Taxol  Dose: 45 mg/m2  Calculated Dose: 94.05 mg                             (In mg/m2, AUC, mg/kg)     Medication: Carboplatin  Dose: AUC 2  Calculated Dose: 296 mg                             (In mg/m2, AUC, mg/kg)    Carboplatin calculation (if applicable):  (2*(123+25)) = 296    I confirm that this process was performed independently.

## 2022-09-20 NOTE — PROGRESS NOTES
Ms Richey is here today for chemotherapy concurrent with radiation. She has no new health complaints.    24 g IV to the left AC. Flushed well, labs drawn per orders.   Lab results are within parameters for treatment.     Pre meds given, tolerated well.    Pt tolerated chemotherapy infusions well, no adverse effects.    IV was removed intact. Flushed well with good blood return.     Ms Richey was discharged home in stable condition.

## 2022-09-21 NOTE — PROGRESS NOTES
On September 21, 2022, Oncology Social Worker Nighat Brown contacted pt.'s sister, Aydee.  Aydee shared she wanted to know who would help her sister to apply for social security disability.  MOE Brown explained she had spoken to pt. today as well as previously about needing to go directly to the Social Security Administration.  Aydee shared when her father had had cancer 40 years ago they had completed the disability application for him.  MOE Brown explained they would need to go through Social Security Administration or contacting disability attorneys if they do not want to go directly through the administration.      MOE Brown also clarified with Aydee comments made to [facility] about not assisting pt. at all.  MOE Brown explained staff had assisted and secured for pt. to be able to stay at the [facility] with [facility] [place] providing the financial assistance.  Aydee clarified she meant no one was helping with the disability but thanked MOE Brown and the staff who was able to obtain assistance for her sister.   Pt.'s sister thanked MOE Brown for returning her phone call.

## 2022-09-21 NOTE — PROGRESS NOTES
"On September 21, 2022, Oncology Social Worker Nighat Brown contacted pt. via telephone after receiving notification from [facility] Marci Hopkins of pt.'s sister request for someone to assist pt. with social security application.      Pt. shared and could be heard that she was \"out of breath.\"  Pt. shared she had been \"running errands\" and asked for OSW Kevin to wait while she \"got her oxygen put back on.\"      OSLAZARO Brown informed pt. of request made by her sister and reiterated needing to get in contact with Social Security Administration to apply for disability.  Pt. agreed and acknowledged understanding this from previous conversation with OSW.  Pt. shared she did not know what her sister's question was and asked for OSLAZARO Brown to contact her sister.  MOE Brown agreed to do so however explained to pt. her preference is always to speak to pt. directly.  Pt. agreed and thanked MOE Brown.    "

## 2022-09-26 NOTE — PROGRESS NOTES
"Pharmacy Chemotherapy Calculations    Patient Name: Geena Richey  Dx: Lung cancer     Cycle 5 (Cancelled for thrombocytopenia and neutropenia)  Previous treatment = C4 9/19/2022    Regimen: Weekly PACLitaxel+CARBOplatin with concurrent radiation followed by PACLitaxel/CARBOplatin  PACLitaxel 45-50mg/m2 IV on Day 1   Followed by   CARBOplatin AUC 2 on Day 1   Weekly cycle for 7 weeks with radiation  Conditionally followed by   PACLitaxel 200mg/m2 on Day 1   Followed by   CARBOplatin AUC6 on Day 1   21 day cycle for 2 cycles starting 2-4 weeks after completion of concurrent chemoradiation   *Dosing Reference*  NCCN Guidelines for Non-Small Cell Lung Cancer. V. 3.2022.  Osito CP, et al. J Clin Oncol. 2005;23(25):5883-91.  Claudio DEBBIE, et al. Lancet Oncol. 2015;16(2):187-99.  Eri VW, et al. J Clin Oncol. 2007;25(3):313-8.     Allergies:  Patient has no known allergies.    /75   Pulse 75   Temp 35.8 °C (96.5 °F) (Temporal)   Resp 20   Ht 1.68 m (5' 6.14\")   Wt 91.9 kg (202 lb 9.6 oz)   SpO2 95%   BMI 32.56 kg/m²  Body surface area is 2.07 meters squared.    Labs9/26/22:  ANC~ 240 Plt = 70k   Hgb = 10.3     SCr = 0.98 mg/dL CrCl ~ 86.75mL/min   AST/ALT/ALP= 10/9/79 TBili = 1.9     Makeda Ochoa, PharmD, BCPS          "

## 2022-09-26 NOTE — PROGRESS NOTES
Racquel arrives to [facility] for C5D1 Taxol/Carbo for lung cancer. Patient c/o dyspnea, productive cough, and fatigue. Denies s/s active infections, open wounds, and mouth sores. 22g PIV placed to L-FA, which flushes easily and has brisk blood return. Labs were drawn, reviewed, and did NOT meet parameters to proceed.    Telephone call made to Dr. Espinoza and relayed the following labs: ; platelet count 70k; Tbili 1.9. TORB Dr. Espinoza to cancel today's treatment. Patient will be returning next Monday for possible cycle 6.     PIV flushed and removed with tip intact. Confirmed next appt with patient. Discharged home to self care in no apparent distress.

## 2022-09-26 NOTE — PROGRESS NOTES
Nutrition Services: Brief Update- Signing Off  Wt Readings from Last 7 Encounters:   09/26/22 91.9 kg (202 lb 9.6 oz)   09/19/22 93.3 kg (205 lb 11 oz)   09/12/22 92.5 kg (203 lb 14.8 oz)   09/05/22 91.5 kg (201 lb 11.5 oz)   08/29/22 90.3 kg (199 lb 1.2 oz)   08/13/22 90.6 kg (199 lb 11.8 oz)   08/09/22 90 kg (198 lb 6.6 oz)     Weight Change: wt stable x ~2 months    Weight remaining desirably stable. No nutrition needs requested or indicated at this time as a result of weight stability. RD will remain available PRN. Pt previously provided RD information at previous visits. Please re-consult RD as needed per pt preferences or if nutrition status changes.      Please contact [phone]

## 2022-09-28 NOTE — ON TREATMENT VISIT
ON TREATMENT  NOTE  RADIATION ONCOLOGY DEPARTMENT    Patient name:  Geena Richey    Primary Physician:  Pcp Pt States None MRN: [id number]  CSN: [id number]   Referring physician:  No ref. provider found : 1960, 62 y.o.     ENCOUNTER DATE:  22    DIAGNOSIS:    Primary lung squamous cell carcinoma, left (HCC)  Staging form: Lung, AJCC 8th Edition  - Clinical: Stage IIB (cT2b, cN1, cM0) - Signed by Nicole Newell M.D. on 2022  Histologic grade (G): G2  Histologic grading system: 4 grade system      TREATMENT SUMMARY:  Aria Treatment Information          Some values may be hidden. Unless noted otherwise, only the newest values recorded on each date are displayed.           Aria Treatment Summary 22   Course First Treatment Date 2022   Course Last Treatment Date 2022   L Lung,Hilum Plan from Course C2_L_lung/med   Fraction  30   Elapsed Course Days  @    Prescribed Fraction Dose 200 cGy   Prescribed Total Dose 6,000 cGy   L Lung,Hilum Reference Point from Course C2_L_lung/med   Elapsed Course Days  @ [id number]   Session Dose 200 cGy   Total Dose 4,400 cGy   L Lung,Hilum CP Reference Point from Course C2_L_lung/med   Elapsed Course Days  @    Session Dose 210 cGy   Total Dose 4,619 cGy   2ndCheckPlan Plan from Course C1   Cylinder Plan from Course C1   Cylinder+.5 Reference Point from Course C1   calc pt Reference Point from Course C1               SUBJECTIVE:  Still having some right-sided thorax pain.  Better today than she was yesterday.        VITAL SIGNS:  KPS: 80, Normal activity with effort; some signs or symptoms of disease (ECOG equivalent 1)     No flowsheet data found.       PHYSICAL EXAM:  No change      Toxicity Assessment 2022   Toxicity Assessment Chest Chest Chest Chest Chest Chest   Fatigue (lethargy, malaise, asthenia) Moderate (e.g., decrease in performance status by  1 ECOG level or 20% Karnofsky) or causing difficulty performing some activities Moderate (e.g., decrease in performance status by 1 ECOG level or 20% Karnofsky) or causing difficulty performing some activities Moderate (e.g., decrease in performance status by 1 ECOG level or 20% Karnofsky) or causing difficulty performing some activities Moderate (e.g., decrease in performance status by 1 ECOG level or 20% Karnofsky) or causing difficulty performing some activities Increased fatigue over baseline, but not altering normal activities Moderate (e.g., decrease in performance status by 1 ECOG level or 20% Karnofsky) or causing difficulty performing some activities   Radiation Dermatitis None Faint erythema or dry desquamation None None None None   Anorexia None None None None None Loss of appetite   Dyspepsia/heartburn None None Severe Severe None None   Dysphagia, Esophagitis, odynophagoa (painful swallowing) None None None Mild dysphagia, but can eat regular diet None None   RT Dysphagia-Esophageal None None None None None None   Cough Absent Absent Mild, relieved by non-prescription medication Absent Mild, relieved by non-prescription medication Mild, relieved by non-prescription medication   Dyspnea Dyspnea at rest or requiring ventilator support Dyspnea at rest or requiring ventilator support Dyspnea at rest or requiring ventilator support Normal Dyspnea on exertion Dyspnea on exertion           IMPRESSION:  Cancer Staging  Primary lung squamous cell carcinoma, left (HCC)  Staging form: Lung, AJCC 8th Edition  - Clinical: Stage IIB (cT2b, cN1, cM0) - Signed by Nicole Newell M.D. on 8/16/2022      PLAN:  No change in treatment plan    Disposition:  Treatment plan reviewed. Questions answered. Continue therapy outlined.     Nicole Newell M.D.    No orders of the defined types were placed in this encounter.

## 2022-10-03 PROBLEM — E87.20 LACTIC ACIDOSIS: Status: ACTIVE | Noted: 2022-01-01

## 2022-10-03 PROBLEM — A41.9 SEPSIS (HCC): Status: ACTIVE | Noted: 2022-01-01

## 2022-10-03 PROBLEM — I26.99 ACUTE PULMONARY EMBOLISM, UNSPECIFIED PULMONARY EMBOLISM TYPE, UNSPECIFIED WHETHER ACUTE COR PULMONALE PRESENT (HCC): Status: ACTIVE | Noted: 2022-01-01

## 2022-10-03 PROBLEM — D64.9 NORMOCYTIC NORMOCHROMIC ANEMIA: Status: ACTIVE | Noted: 2022-01-01

## 2022-10-03 NOTE — ED TRIAGE NOTES
"Pt to triage with   Chief Complaint   Patient presents with    Sent by MD     Sent by Dr Grewal to get her \"lungs checked out\" increased in shortness of breath.  Right sided chest pain, cough d/t radiation.  Treatment for Lung CA.        Protocol ordered.  Received radiation, chemo on hold for now.   Pt Informed regarding triage process and verbalized understanding to inform triage tech or RN for any changes in condition. Placed in lobby.     "

## 2022-10-03 NOTE — ED PROVIDER NOTES
"ED Provider Note    Scribed for Rubin Melo M.D. by Edis Hoffman. 10/3/2022,  2:25 PM.    Means of Arrival: walk in  History obtained from: patient  History limited by: none    CHIEF COMPLAINT  Chief Complaint   Patient presents with    Sent by MD     Sent by Dr Grewal to get her \"lungs checked out\" increased in shortness of breath.  Right sided chest pain, cough d/t radiation.  Treatment for Lung CA.        HPI  Geena Richey is a 62 y.o. female who presents to the Emergency Department for increasing shortness of breath onset the past few days. She was sent by her PCP Dr. Grewal in order to have her lungs examined. He believes that she may be having a PE. She currently has associated cough, dry mouth, right sided chest pain and tachycardia, she states it feels like \"her heart is racing\". She denies any sweating or fever. She currently takes rescue inhalers but states she ran out recently. She has been diagnosed with lung cancer and AFIB, and she is currently on supplemental oxygen. She also is currently undergoing radiation for treatment of her cancer. She states that she does not take any medications.    REVIEW OF SYSTEMS  CONSTITUTIONAL:  No fever or sweating.  CARDIOVASCULAR:  Right sided chest pain   RESPIRATORY:  Shortness of breath, cough.  See HPI for further details.   All other systems are negative.     PAST MEDICAL HISTORY  Past Medical History:   Diagnosis Date    Arthritis     hips    Cancer (HCC) 2014    uterine    Cholesterol blood decreased     Other specified symptom associated with female genital organs     post menopausal bleeding       FAMILY HISTORY  History reviewed. No pertinent family history.    SOCIAL HISTORY   reports that she quit smoking about 2 months ago. Her smoking use included cigarettes. She has a 17.50 pack-year smoking history. She has never used smokeless tobacco. She reports current alcohol use. She reports that she does not use drugs.    SURGICAL HISTORY  Past " "Surgical History:   Procedure Laterality Date    IN BRONCHOSCOPY,DIAGNOSTIC N/A 8/10/2022    Procedure: BRONCHOSCOPY WITH ENDOBRONCHIAL ULTRASOUND;  Surgeon: Bonnie Boucher M.D.;  Location: [facility];  Service: Pulmonary    ENDOBRONCHIAL US ADD-ON N/A 8/10/2022    Procedure: ENDOBRONCHIAL ULTRASOUND (EBUS);  Surgeon: Bonnie Boucher M.D.;  Location: [facility];  Service: Pulmonary    HYSTERECTOMY ROBOTIC XI  9/25/2014    Performed by Anjum Simpson M.D. at SURGERY [facility] ORS    LYMPH NODE DISSECTION ROBOTIC XI  9/25/2014    Performed by Anjum Simpson M.D. at SURGERY [facility] ORS    OMENTECTOMY  9/25/2014    Performed by Anjum Simpson M.D. at SURGERY [facility]    DILATION AND CURETTAGE  8/14/2014    Performed by Sarath Waller M.D. at SURGERY SAME DAY [facility]    GYN SURGERY  1980's    tubal ligation       CURRENT MEDICATIONS  Home Medications       Reviewed by Isadora Mercado (Pharmacy Tech) on 10/03/22 at 1852  Med List Status: Complete     Medication Last Dose Status   albuterol 108 (90 Base) MCG/ACT Aero Soln inhalation aerosol > 2 weeks Active   budesonide-formoterol (SYMBICORT) 80-4.5 MCG/ACT Aerosol > 2 weeks Active                    ALLERGIES  No Known Allergies    PHYSICAL EXAM  VITAL SIGNS: /66   Pulse 73   Temp 36.2 °C (97.2 °F) (Temporal)   Resp 18   Ht 1.727 m (5' 8\")   Wt 92.5 kg (204 lb)   LMP 03/15/2014   SpO2 94%   BMI 31.02 kg/m²    Gen: Alert, mild distress  HEENT: ATNC  Eyes: PERRL, EOMI, normal conjunctiva.   Neck: trachea midline  Resp: Respiratory distress, diminished lung sounds on the right.  No wheezing.  No crackles  CV: No JVD, irregularly irregular, tachycardic.  No pitting edema  Abd: non-distended, soft, nontender  Ext: No deformities, moves all extremities  Psych: normal mood  Neuro: speech fluent, GCS 15, moves all extremities    DIAGNOSTIC STUDIES / PROCEDURES     EKG  Results for orders placed or performed during the hospital " encounter of 10/03/22   EKG   Result Value Ref Range    Report       [facility] Emergency Dept.    Test Date:  2022-10-03  Pt Name:    ALEJANDRA IQBAL               Department: ER  MRN:        [id number]                      Room:  Gender:     Female                       Technician: [id number]  :        1960                   Requested By:ER TRIAGE PROTOCOL  Order #:    [id number]                    Reading MD: Rubin Melo    Measurements  Intervals                                Axis  Rate:       162                          P:  LA:                                      QRS:        -57  QRSD:       85                           T:          2  QT:         271  QTc:        446    Interpretive Statements  Atrial fibrillation with rapid V-rate  Left anterior fascicular block  Consider anterior infarct  Compared to ECG 09/15/2014 14:18:00  Sinus rhythm no longer present  Myocardial infarct finding still present  Electronically Signed On 10-3-2022 18:34:01 PDT by Rubin Melo          LABS  Labs Reviewed   CBC WITH DIFFERENTIAL - Abnormal; Notable for the following components:       Result Value    WBC 4.0 (*)     RBC 2.98 (*)     Hemoglobin 9.7 (*)     Hematocrit 28.7 (*)     RDW 52.1 (*)     Platelet Count 142 (*)     Neutrophils-Polys 82.60 (*)     Lymphocytes 5.20 (*)     Lymphs (Absolute) 0.21 (*)     All other components within normal limits   COMP METABOLIC PANEL - Abnormal; Notable for the following components:    Sodium 130 (*)     Glucose 157 (*)     Alkaline Phosphatase 269 (*)     Total Bilirubin 2.0 (*)     All other components within normal limits   LACTIC ACID - Abnormal; Notable for the following components:    Lactic Acid 2.7 (*)     All other components within normal limits   TROPONIN - Abnormal; Notable for the following components:    Troponin T 38 (*)     All other components within normal limits   APTT - Abnormal; Notable for the following components:    APTT 22.6 (*)     All other  components within normal limits    Narrative:     Indicate which anticoagulants the patient is on:->HEPARIN   PROTHROMBIN TIME - Abnormal; Notable for the following components:    PT 16.6 (*)     INR 1.36 (*)     All other components within normal limits    Narrative:     Indicate which anticoagulants the patient is on:->HEPARIN   ESTIMATED GFR   PLATELET ESTIMATE   MORPHOLOGY   PERIPHERAL SMEAR REVIEW   DIFFERENTIAL MANUAL   HEPARIN XA (UNFRACTIONATED)    Narrative:     Indicate which anticoagulants the patient is on:->HEPARIN   MAGNESIUM     All labs reviewed by me.    RADIOLOGY  CT-CTA CHEST PULMONARY ARTERY W/ RECONS   Final Result         Acute bilateral pulmonary emboli, right greater than left, as detailed above with borderline findings of right heart strain, age indeterminate.      Bilateral lower lobe peripheral consolidations with central areas of lucency, most likely related to pulmonary infarcts.      Improved aeration within the left lung with likely improving central obstructing hilar mass.      New small right pleural effusion. Small-to-moderate left pleural effusion is mildly increased.      Mild adenopathy again noted.      Splenic infarcts again noted.      Fleischner Society pulmonary nodule recommendations:   Not applicable      These findings were discussed with LEYDA CASAS on 10/3/2022 6:30 PM.      DX-CHEST-PORTABLE (1 VIEW)   Final Result      1.  New RIGHT lung base consolidation peripherally with probable associated pleural fluid.   2.  Improvement of LEFT lung base consolidation.   3.  LEFT perihilar infiltrate or mass again seen.      EC-ECHOCARDIOGRAM COMPLETE W/O CONT    (Results Pending)   US-EXTREMITY VENOUS LOWER BILAT    (Results Pending)     The radiologist’s interpretation of all radiology studies have been reviewed by me.    COURSE & MEDICAL DECISION MAKING  Pertinent Labs & Imaging studies reviewed. (See chart for details)    2:25 PM Patient seen and examined at bedside.  Patient will be treated with Cardizem 10 mg for her symptoms.     6:34 PM - Paged Intensivist.    6:54 PM - I discussed the patient's case and the above findings with Dr. Mohan (Intensivist) who will assess the patient for hospitalization.      HYDRATION: Based on the patient's presentation of Dehydration and Tachycardia the patient was given IV fluids. IV Hydration was used because oral hydration was not adequate alone. Upon recheck following hydration, the patient was improved.     Medical Decision Making:  Patient presents with atrial fibrillation with rapid ventricular rates.  She also has increased work of breathing.  Work of breathing did improve after nebulizer treatments.  The patient was initially treated with multiple rounds of diltiazem with minimal effect.  She was then given some IV metoprolol with a small amount of improvement but persistent tachycardia and rapid ventricular response.  Based on the patient's presentation, there was concern for pneumothorax, however bedside ultrasound was performed which demonstrates lung sliding bilaterally.  She does have a pleural effusion on ultrasound.  This is confirmed on chest x-ray.  Given the patient's overall presentation, I continue to have a high suspicion for PE given her cancer, shortness of breath, absence of wheezing or crackles on lung exam.  I do not believe the patient's pleural effusion is sufficient to explain her difficulty breathing.    CTA is positive for blood clot.  She has a high risk pulmonary embolism given her elevated troponin, persistent atrial fibrillation with rapid ventricular response, will admit to the ICU, started on heparin drip.  Patient is not on pressors, on nasal cannula oxygen, no current indication for thrombolysis.    DISPOSITION:  Patient will be hospitalized by Dr. Mohan in critical condition.    CRITICAL CARE  The very real possibilty of a deterioration of this patient's condition required the highest level of my  preparedness for sudden, emergent intervention.  I provided critical care services, which included medication orders, frequent reevaluations of the patient's condition and response to treatment, ordering and reviewing test results, and discussing the case with various consultants.  The critical care time associated with the care of the patient was 76 minutes. Review chart for interventions. This time is exclusive of any other billable procedures.     FINAL IMPRESSION  1. Acute pulmonary embolism without acute cor pulmonale, unspecified pulmonary embolism type (HCC)    2. Atrial fibrillation with RVR (HCC)    3. Acute exacerbation of chronic obstructive pulmonary disease (COPD) (HCC)         Edis MESSINA (Chicho), am scribing for, and in the presence of, Rubin Melo M.D..    Electronically signed by: Edsi Hoffman ([place]o), 10/3/2022    IRubin M.D. personally performed the services described in this documentation, as scribed by Edis Hoffman in my presence, and it is both accurate and complete.    The note accurately reflects work and decisions made by me.  Rubin Melo M.D.  10/3/2022  10:01 PM      This dictation was created using voice recognition software. The accuracy of the dictation is limited to the abilities of the software. I expect there may be some errors of grammar and possibly content. The nursing notes were reviewed and certain aspects of this information were incorporated into this note.

## 2022-10-04 NOTE — ED NOTES
Med rec updated and complete. Allergies reviewed. Confirmed name and date of birth. Pt reports that she has been out of her inhalers for > 2 weeks and had no refills available. No antibiotic use in last 30 days.      Home pharmacy [facility] [phone]

## 2022-10-04 NOTE — ED NOTES
Assumed care of pt, received report from HATTIE Mcbride. Safety rounds completed, pt resting comfortably in the room. Weighed pt on stand up scale

## 2022-10-04 NOTE — PROGRESS NOTES
Notified Dr Casiano that VAT team needed to replace a PIV as bedside staff has exhausted their abilities. Team trying to avoid midline or picc access r/t neutropenia and infection risk, but may be reaching the end of our PIV options and needs to be readdressed if she is going to stay on amiodarone as well as the heparin

## 2022-10-04 NOTE — PROGRESS NOTES
Pt having difficult eating regular diet consistency, placed soft and bite size diet since she is missing teeth

## 2022-10-04 NOTE — PROGRESS NOTES
Discussed pt's scheduled radiation with Dr Casiano, based on pt's current HR and finding of DVT this am he would like to hold off on radiation today. Called rad onc to notify she would not be there today and she can reschedule for a later date/once HR is more under control

## 2022-10-04 NOTE — ASSESSMENT & PLAN NOTE
Likely provoked PE in the setting of non-small cell lung cancer.  CT-PE on 10/3 showing acute, R>L pulmonary embolism.  On admission YONNY score of 5 (HR >110, elevated troponin, RV strain on CTA).  -continue heparin gtt  -echo pending  -LE ultrasound showing DVT in left tibial vein

## 2022-10-04 NOTE — CARE PLAN
Problem: Bronchoconstriction  Goal: Improve in air movement and diminished wheezing  Description: Target End Date:  2 to 3 days    1.  Implement inhaled treatments  2.  Evaluate and manage medication effects  10/4/2022 1539 by Edu Mohan, RRT  Outcome: Progressing  10/4/2022 1538 by Edu Mohan, RRT  Outcome: Progressing

## 2022-10-04 NOTE — ASSESSMENT & PLAN NOTE
Stage IIB.  Undergoing chemotherapy and daily radiation therapy.  Last chemo was 2 weeks prior to admission.

## 2022-10-04 NOTE — ASSESSMENT & PLAN NOTE
AHRF due to COPD exacerbation vs PE.  Lung physical exam findings likely due to lung cancer, as they are unilateral.    -RT/O2 protocols  -Scheduled and PRN Duonebs  -Scheduled steroids x 5 days

## 2022-10-04 NOTE — ASSESSMENT & PLAN NOTE
Was on diltiazem gtt from ED.  Rhythm changed to atrial flutter overnight on 10/3.  -stopped diltiazem gtt  -started amiodarone gtt

## 2022-10-04 NOTE — CARE PLAN
23:00- Notified MD Mohan that HR is still 140's w/ Dilt drip at 20 and pt having vtac  and multiple PVCs..  MD placed order for Amio drip.     05:30- Attending MD Rounded, updated on HR now 120-130's, and pt still having multiple PVCs.    Mg replacement ordered by STEFF Prasad.       The patient is Watcher - Medium risk of patient condition declining or worsening    Shift Goals  Clinical Goals: Decrease HR, Maintain stable vital signs, promote rest, monitor Hep Xa for Heparan drip  Patient Goals: Comfort, rest, food  Family Goals: NA    Progress made toward(s) clinical / shift goals:     Problem: Pain - Standard  Goal: Alleviation of pain or a reduction in pain to the patient’s comfort goal  Outcome: Progressing     Problem: Knowledge Deficit - Standard  Goal: Patient and family/care givers will demonstrate understanding of plan of care, disease process/condition, diagnostic tests and medications  Outcome: Progressing     Problem: Ineffective Airway Clearance  Goal: Patient will maintain patent airway with clear/clearing breath sounds  Outcome: Progressing     Problem: Impaired Gas Exchange  Goal: Patient will demonstrate improved ventilation and adequate oxygenation and participate in treatment regimen within the level of ability/situation.  Outcome: Progressing     Problem: Fall Risk  Goal: Patient will remain free from falls  Outcome: Progressing       Patient is not progressing towards the following goals:

## 2022-10-04 NOTE — ASSESSMENT & PLAN NOTE
Last chemo about 2 weeks ago, had pancytopenia w/ low ANC.  Etiology likely due to chemotherapy.  -continue to monitor, transfusion goal <7

## 2022-10-04 NOTE — ASSESSMENT & PLAN NOTE
This is Sepsis Present on admission  SIRS criteria identified on my evaluation include: Tachycardia, with heart rate greater than 90 BPM, Tachypnea, with respirations greater than 20 per minute and Leukopenia, with WBC less than 4,000  Source is none.    Sepsis protocol initiated  Fluid resuscitation ordered per protocol  Crystalloid Fluid Administration: pt received 1.5 liters in ER. Will continue boluses in ICU  IV antibiotics as appropriate for source of sepsis  Reassessment: I have reassessed the patient's hemodynamic status    No suspected infection at this time, elevated SIRS likely due to PE and atrial flutter/fib.

## 2022-10-04 NOTE — PROGRESS NOTES
[facility] Progress Note      Admit Date: 10/3/2022  ICU Day:   [] 1      Resident(s): Benedicto Casiano D.O.  Attending: JANNY OTERO/ Dr. Galeas    Date & Time:   10/4/2022   10:05 AM       Patient ID:    Name:             Geena Richey   YOB: 1960  Age:                 62 y.o.  female   MRN:               [id number]    HPI:  Racquel Richey is a 63 y/o woman w/ PMHx of stage IIb NSCL cancer (dx 8/2022, currently receiving chemo and radiation), COPD, pA-fib who was admitted on 10/3 foe b/l pulmonary emboli and was found to be in atrial fibrillation w/ RVR.  PE being treated w/ heparin gtt, a-fib was treated w/ diltiazem gtt, but on PM of 10/3, she converted into atrial flutter w/ RVR and was started on amiodarone.      Consultants:  None     Procedures:  N/a    Imaging:  US-EXTREMITY VENOUS LOWER BILAT   Final Result      EC-ECHOCARDIOGRAM COMPLETE W/ CONT   Final Result      CT-CTA CHEST PULMONARY ARTERY W/ RECONS   Final Result         Acute bilateral pulmonary emboli, right greater than left, as detailed above with borderline findings of right heart strain, age indeterminate.      Bilateral lower lobe peripheral consolidations with central areas of lucency, most likely related to pulmonary infarcts.      Improved aeration within the left lung with likely improving central obstructing hilar mass.      New small right pleural effusion. Small-to-moderate left pleural effusion is mildly increased.      Mild adenopathy again noted.      Splenic infarcts again noted.      Fleischner Society pulmonary nodule recommendations:   Not applicable      These findings were discussed with LEYDA CASAS on 10/3/2022 6:30 PM.      DX-CHEST-PORTABLE (1 VIEW)   Final Result      1.  New RIGHT lung base consolidation peripherally with probable associated pleural fluid.   2.  Improvement of LEFT lung base consolidation.   3.  LEFT perihilar infiltrate or mass again seen.           Interval Events:  10/4:  Overnight  pt converted to atrial flutter, started on amio gtt.  Otherwise pt reports no concerns at this time, breathing is stable, hasn't noticed any improvement or decrement.      Review of Systems   Constitutional:  Negative for chills and fever.   Respiratory:  Positive for shortness of breath (stable). Negative for cough.    Cardiovascular:  Negative for chest pain and palpitations.   Gastrointestinal:  Negative for abdominal pain, diarrhea, nausea and vomiting.   Neurological:  Negative for dizziness and headaches.     PHYSICAL EXAM    Physical Exam  Vitals and nursing note reviewed.   Constitutional:       General: She is not in acute distress.     Appearance: She is ill-appearing (chronically ill appearing).   HENT:      Head: Normocephalic and atraumatic.      Mouth/Throat:      Mouth: Mucous membranes are moist.      Pharynx: Oropharynx is clear.   Eyes:      Extraocular Movements: Extraocular movements intact.      Conjunctiva/sclera: Conjunctivae normal.   Cardiovascular:      Rate and Rhythm: Tachycardia present. Rhythm irregular.      Pulses: Normal pulses.      Heart sounds: No murmur heard.    No friction rub. No gallop.   Pulmonary:      Effort: Pulmonary effort is normal. No respiratory distress.      Comments: Left lower lung fields w/ inspiratory stridor and wheezing.  Rhonchi present in left lower and middle lung fields.   Abdominal:      General: Abdomen is flat. There is no distension.      Palpations: Abdomen is soft.      Tenderness: There is no abdominal tenderness.   Musculoskeletal:      Right lower leg: No edema.      Left lower leg: No edema.   Skin:     General: Skin is warm and dry.      Comments: Multiple nodules present at multiple areas of body   Neurological:      General: No focal deficit present.      Mental Status: She is alert and oriented to person, place, and time.        Respiratory:     Respiration: (!) 30, Pulse Oximetry: 94 %    Chest Tube Drains:          HemoDynamics:  Pulse:  (!) 142 Blood Pressure: 130/81      Neuro:      Fluids:  Date 10/04/22 0700 - 10/05/22 0659   Shift 3272-7319 7469-7245 1653-1245 24 Hour Total   INTAKE   I.V. 335.5   335.5     Magnesium Sulfate Volume 50   50     Amiodarone Volume 31.7   31.7     Heparin Volume 61.8   61.8     Volume (mL) (dextrose 5% infusion) 192   192   Shift Total 335.5   335.5   OUTPUT   Shift Total       .5   335.5        Intake/Output Summary (Last 24 hours) at 10/4/2022 1005  Last data filed at 10/4/2022 0800  Gross per 24 hour   Intake 2659.95 ml   Output 200 ml   Net 2459.95 ml       Weight: 89.3 kg (196 lb 13.9 oz)  Body mass index is 32.02 kg/m².    Recent Labs     10/03/22  1419 10/04/22  0116   SODIUM 130* 131*   POTASSIUM 3.9 4.3   CHLORIDE 96 98   CO2 21 19*   BUN 21 18   CREATININE 0.64 0.63   MAGNESIUM 1.6 1.5   PHOSPHORUS  --  2.7   CALCIUM 8.5 7.9*       GI/Nutrition:  Recent Labs     10/03/22  1419 10/04/22  011   ALTSGPT 39  --    ASTSGOT 44  --    ALKPHOSPHAT 269*  --    TBILIRUBIN 2.0*  --    GLUCOSE 157* 197*       Heme:  Recent Labs     10/03/22  1419 10/03/22  1905 10/04/22  0116   RBC 2.98*  --  2.79*   HEMOGLOBIN 9.7*  --  8.9*   HEMATOCRIT 28.7*  --  26.9*   PLATELETCT 142*  --  159*   PROTHROMBTM  --  16.6*  --    APTT  --  22.6*  --    INR  --  1.36*  --        Infectious Disease:  Temp  Av.3 °C (97.4 °F)  Min: 36.2 °C (97.2 °F)  Max: 36.6 °C (97.8 °F)  Recent Labs     10/03/22  1419 10/04/22  011   WBC 4.0* 4.1*   NEUTSPOLYS 82.60* 81.80*   LYMPHOCYTES 5.20* 3.70*   MONOCYTES 10.40 12.30   EOSINOPHILS 0.00 0.00   BASOPHILS 0.00 0.20   ASTSGOT 44  --    ALTSGPT 39  --    ALKPHOSPHAT 269*  --    TBILIRUBIN 2.0*  --        Meds:   benzonatate  100 mg      heparin  0-30 Units/kg/hr (Adjusted) 22 Units/kg/hr (10/04/22 0925)    heparin  40 Units/kg (Adjusted)      Respiratory Therapy Consult        ipratropium-albuterol  3 mL      ipratropium-albuterol  3 mL      methylPREDNISolone  40 mg       senna-docusate  2 Tablet      And    polyethylene glycol/lytes  1 Packet      And    magnesium hydroxide  30 mL      And    bisacodyl  10 mg      MD Alert...Adult ICU Electrolyte Replacement per Pharmacy        acetaminophen  650 mg      ondansetron  4 mg      ondansetron  4 mg      melatonin  5 mg      traZODone  50 mg      dextrose 5%   30 mL/hr at 10/04/22 0500    amiodarone infusion  0.5-1 mg/min 0.5 mg/min (10/04/22 0608)          Assessment and Plan:  * Acute pulmonary embolism, unspecified pulmonary embolism type, unspecified whether acute cor pulmonale present (HCC)  Assessment & Plan  Likely provoked PE in the setting of non-small cell lung cancer.  CT-PE on 10/3 showing acute, R>L pulmonary embolism.  On admission YONNY score of 5 (HR >110, elevated troponin, RV strain on CTA).  -continue heparin gtt  -echo pending  -LE ultrasound showing DVT in left tibial vein    Paroxysmal atrial fibrillation (HCC)  Assessment & Plan  Was on diltiazem gtt from ED.  Rhythm changed to atrial flutter overnight on 10/3.  -stopped diltiazem gtt  -started amiodarone gtt    Primary lung squamous cell carcinoma, left (HCC)  Assessment & Plan  Stage IIB.  Undergoing chemotherapy and daily radiation therapy.  Last chemo was 2 weeks prior to admission.    Sepsis (HCC)  Assessment & Plan  This is Sepsis Present on admission  SIRS criteria identified on my evaluation include: Tachycardia, with heart rate greater than 90 BPM, Tachypnea, with respirations greater than 20 per minute and Leukopenia, with WBC less than 4,000  Source is none.    Sepsis protocol initiated  Fluid resuscitation ordered per protocol  Crystalloid Fluid Administration: pt received 1.5 liters in ER. Will continue boluses in ICU  IV antibiotics as appropriate for source of sepsis  Reassessment: I have reassessed the patient's hemodynamic status    No suspected infection at this time, elevated SIRS likely due to PE and atrial flutter/fib.            Lactic  acidosis  Assessment & Plan  Improving.     Likely due to hypoxia and tachycardia  S/p IV fluids  monitor    Normocytic normochromic anemia  Assessment & Plan  Last chemo about 2 weeks ago, had pancytopenia w/ low ANC.  Etiology likely due to chemotherapy.  -continue to monitor, transfusion goal <7    Tobacco use disorder  Assessment & Plan  Pt quit smoking 3 months ago when she was diagnosed with lung cancer.    Acute exacerbation of chronic obstructive pulmonary disease (COPD) (HCC)  Assessment & Plan  AHRF due to COPD exacerbation vs PE.  Lung physical exam findings likely due to lung cancer, as they are unilateral.    -RT/O2 protocols  -Scheduled and PRN Duonebs  -Scheduled steroids x 5 days           Quality Measures:  Feeding:  soft, bite sized diet  Analgesia: tylenol  Sedation: n/a  Thromboprophylaxis: heparin gtt  Head of bed: >30 degrees  Ulcer prophylaxis: n/a  Glycemic control: n/a  Bowel care: bowel regimen  Indwelling lines: pIV  Deescalation of antibiotics: n/a      CODE STATUS:   Full code  DISPO:    Continued ICU management.

## 2022-10-04 NOTE — CARE PLAN
The patient is Watcher - Medium risk of patient condition declining or worsening    Shift Goals  Clinical Goals: convert to sinus and decrease HR, get anti xa in therapeutic range  Patient Goals: rest, get out of here  Family Goals: NA    Progress made toward(s) clinical / shift goals:  up in chair most of the day, said she will drink boosts if her sister brings them to increase nutrition, had 1 anti xa in range, pain controlled with tylenol     Problem: Pain - Standard  Goal: Alleviation of pain or a reduction in pain to the patient’s comfort goal  Outcome: Progressing     Problem: Self Care  Goal: Patient will have the ability to perform ADLs independently or with assistance (bathe, groom, dress, toilet and feed)  Outcome: Progressing     Problem: Fall Risk  Goal: Patient will remain free from falls  Outcome: Progressing       Patient is not progressing towards the following goals: hasn't converted into sinus, still afib mostly 130's-140's

## 2022-10-04 NOTE — PROGRESS NOTES
Monitor summary  Afib x/.09/x with frequent PVC's  Rate 132-147  12 hour chart check complete

## 2022-10-04 NOTE — ED NOTES
Heparin bolus given and heparin drip initiated as ordered. Unable to establish a 2nd IV, will attempt US guided IV insertion

## 2022-10-04 NOTE — H&P
"Critical Care History & Physical Note    Date of Service  10/3/2022    Primary Care Physician  Pcp Pt States None    Consultants  critical care      Code Status  Full Code    Chief Complaint  Chief Complaint   Patient presents with    Sent by MD     Sent by Dr Grewal to get her \"lungs checked out\" increased in shortness of breath.  Right sided chest pain, cough d/t radiation.  Treatment for Lung CA.        History of Presenting Illness  Geena Richey is a 62 y.o. female with the past medical history of stage IIb NSCLCa diagnosed August of 2022 undergoing chemotherapy and radiation therapy, COPD, and paroxysmal atrial fibrillation who reports being short of breath for the past 3 months since her diagnosis of lung cancer in August; however, she reports in the last week she has felt more short of breath especially with any exertion.  She has noted palpitations.  She denies chest pain, unilateral leg swelling, or hemoptysis.  She has noted more wheezing.  She denies fever, chills, or congestion.  She was seen at radiation therapy for her normal session; however, was told to go to the ER due to her dyspnea.      The patient was found to have bilateral PE with atrial fibrillation in RVR.  She was started on heparin drip as well as diltiazem drip and admitted to the ICU.    I discussed the plan of care with patient, bedside RN, charge RN, and pharmacy.    Review of Systems  Review of Systems   Constitutional:  Positive for malaise/fatigue. Negative for chills and fever.   HENT:  Negative for congestion and sore throat.    Eyes:  Negative for blurred vision and double vision.   Respiratory:  Positive for cough, shortness of breath and wheezing. Negative for hemoptysis and sputum production.    Cardiovascular:  Positive for palpitations. Negative for chest pain and leg swelling.   Gastrointestinal:  Negative for abdominal pain, blood in stool, melena, nausea and vomiting.   Genitourinary:  Negative for flank pain and " hematuria.   Musculoskeletal:  Negative for back pain and neck pain.   Skin:  Negative for rash.   Neurological:  Negative for dizziness, focal weakness, weakness and headaches.   Endo/Heme/Allergies:  Does not bruise/bleed easily.   Psychiatric/Behavioral:  Negative for depression. The patient is not nervous/anxious.      Past Medical History   has a past medical history of Arthritis, Cancer (HCC) (2014), Cholesterol blood decreased, and Other specified symptom associated with female genital organs.    Surgical History   has a past surgical history that includes gyn surgery (1980's); dilation and curettage (8/14/2014); hysterectomy robotic xi (9/25/2014); lymph node dissection robotic xi (9/25/2014); omentectomy (9/25/2014); pr bronchoscopy,diagnostic (N/A, 8/10/2022); and endobronchial us add-on (N/A, 8/10/2022).     Family History  Family history reviewed with patient. The patient denies a history of lug gcancer in her family.     Social History  Reports that she quit smoking August of 2022. Her smoking use included cigarettes: 1-2 packs of cigarettes for 40-45 years. The patient had a heavy alcohol history,but also quit drinking alcohol when she was diagnosed with lung cancer in August of 2022.  Denies every using illegal or illicit drugs  Allergies  No Known Allergies    Medications  Prior to Admission Medications   Prescriptions Last Dose Informant Patient Reported? Taking?   albuterol 108 (90 Base) MCG/ACT Aero Soln inhalation aerosol > 2 weeks at ran out  Yes Yes   Sig: Inhale 2 Puffs every four hours as needed for Shortness of Breath.   budesonide-formoterol (SYMBICORT) 80-4.5 MCG/ACT Aerosol > 2 weeks at ran out  No No   Sig: Inhale 2 Puffs by mouth 2 times a day.      Facility-Administered Medications: None       Physical Exam  Temp:  [36.2 °C (97.2 °F)] 36.2 °C (97.2 °F)  Pulse:  [] 134  Resp:  [18-37] 26  BP: ()/(61-88) 99/65  SpO2:  [89 %-96 %] 90 %  Blood Pressure: (!) 99/65   Temperature:  36.2 °C (97.2 °F)   Pulse: (!) 134   Respiration: (!) 26   Pulse Oximetry: 90 %       Physical Exam  Vitals and nursing note reviewed.   Constitutional:       General: She is in acute distress.      Appearance: She is obese. She is ill-appearing. She is not toxic-appearing.   HENT:      Head: Normocephalic and atraumatic.      Right Ear: External ear normal.      Left Ear: External ear normal.      Nose: Nose normal. No rhinorrhea.      Mouth/Throat:      Mouth: Mucous membranes are dry.      Pharynx: Oropharynx is clear. No oropharyngeal exudate.   Eyes:      General: No scleral icterus.     Extraocular Movements: Extraocular movements intact.      Conjunctiva/sclera: Conjunctivae normal.      Pupils: Pupils are equal, round, and reactive to light.   Cardiovascular:      Rate and Rhythm: Tachycardia present. Rhythm irregularly irregular.      Pulses:           Radial pulses are 2+ on the right side and 2+ on the left side.        Dorsalis pedis pulses are 2+ on the right side and 2+ on the left side.      Heart sounds: No murmur heard.  Pulmonary:      Effort: Respiratory distress present.      Breath sounds: Wheezing present.      Comments: Diminished right base, coarse with scant wheezing  Chest:      Chest wall: No tenderness.   Abdominal:      Palpations: Abdomen is soft.      Tenderness: There is no abdominal tenderness. There is no guarding or rebound.   Musculoskeletal:         General: Normal range of motion.      Cervical back: Normal range of motion and neck supple.      Right lower le+ Edema present.      Left lower le+ Edema present.   Lymphadenopathy:      Cervical: No cervical adenopathy.   Skin:     General: Skin is warm and dry.      Capillary Refill: Capillary refill takes less than 2 seconds.      Findings: No rash.   Neurological:      Mental Status: She is alert and oriented to person, place, and time.      Cranial Nerves: No cranial nerve deficit.      Sensory: No sensory deficit.       Motor: No weakness.   Psychiatric:         Mood and Affect: Mood normal.         Behavior: Behavior normal.         Thought Content: Thought content normal.       Laboratory:  Recent Labs     10/03/22  1419   WBC 4.0*   RBC 2.98*   HEMOGLOBIN 9.7*   HEMATOCRIT 28.7*   MCV 96.3   MCH 32.6   MCHC 33.8   RDW 52.1*   PLATELETCT 142*   MPV 11.9     Recent Labs     10/03/22  1419   SODIUM 130*   POTASSIUM 3.9   CHLORIDE 96   CO2 21   GLUCOSE 157*   BUN 21   CREATININE 0.64   CALCIUM 8.5     Recent Labs     10/03/22  1419   ALTSGPT 39   ASTSGOT 44   ALKPHOSPHAT 269*   TBILIRUBIN 2.0*   GLUCOSE 157*         No results for input(s): NTPROBNP in the last 72 hours.      Recent Labs     10/03/22  1419   TROPONINT 38*       Imaging:  CT-CTA CHEST PULMONARY ARTERY W/ RECONS   Final Result         Acute bilateral pulmonary emboli, right greater than left, as detailed above with borderline findings of right heart strain, age indeterminate.      Bilateral lower lobe peripheral consolidations with central areas of lucency, most likely related to pulmonary infarcts.      Improved aeration within the left lung with likely improving central obstructing hilar mass.      New small right pleural effusion. Small-to-moderate left pleural effusion is mildly increased.      Mild adenopathy again noted.      Splenic infarcts again noted.      Fleischner Society pulmonary nodule recommendations:   Not applicable      These findings were discussed with LEYDA CASAS on 10/3/2022 6:30 PM.      DX-CHEST-PORTABLE (1 VIEW)   Final Result      1.  New RIGHT lung base consolidation peripherally with probable associated pleural fluid.   2.  Improvement of LEFT lung base consolidation.   3.  LEFT perihilar infiltrate or mass again seen.      EC-ECHOCARDIOGRAM COMPLETE W/O CONT    (Results Pending)   US-EXTREMITY VENOUS LOWER BILAT    (Results Pending)         Assessment/Plan:  Justification for Admission Status  I anticipate this patient will require at  least two midnights for appropriate medical management, necessitating inpatient admission because therapy/treatment for PA and paroxysmal atrial fibrillation    Patient will need a ICU (Adult and Pediatrics) bed on MEDICAL service .  The need is secondary to high YONNY score at 5 with high risk of decompensating requiring alteplase as well as active titration of diltiazem drip in the setting of a 62 year old lady with stage IIb lung cancer and a 50 pack year smoking history.    * Acute pulmonary embolism, unspecified pulmonary embolism type, unspecified whether acute cor pulmonale present (HCC)- (present on admission)  Assessment & Plan  Likely subacute given patient unable to pinpoint exact timing of symptoms  YONNY score of 5 (HR>110, elevated troponin, RV strain on CTA)  Provoked with patient's cancer history  Heparin infusion ongoing  Can give half dose of alteplase at 50mg should her symptoms worsen/hemodynamic instability  US lower extremities  ECHO     Sepsis (HCC)- (present on admission)  Assessment & Plan  This is Sepsis Present on admission  SIRS criteria identified on my evaluation include: Tachycardia, with heart rate greater than 90 BPM, Tachypnea, with respirations greater than 20 per minute and Leukopenia, with WBC less than 4,000  Source is ? lung  Sepsis protocol initiated  Fluid resuscitation ordered per protocol  Crystalloid Fluid Administration: pt received 1.5 liters in ER. Will continue boluses in ICU  IV antibiotics as appropriate for source of sepsis  Reassessment: I have reassessed the patient's hemodynamic status    Do not suspect infection at this time  Suspect all related to PE and a-fib          Lactic acidosis- (present on admission)  Assessment & Plan  Likely due to hypoxia and tachycardia  S/p IV fluids  monitor    Normocytic normochromic anemia- (present on admission)  Assessment & Plan  Last chemo about 2 weeks ago  No obvious acute blood loss  Monitor  Conservative transfusion  protocols    Tobacco use disorder- (present on admission)  Assessment & Plan  Pt quit smoking 3 months ago when she was diagnosed with lung cancer in August    Acute exacerbation of chronic obstructive pulmonary disease (COPD) (HCC)  Assessment & Plan  RT/O2 protocols  Scheduled and PRN Duonebs  Scheduled steroids    Paroxysmal atrial fibrillation (HCC)- (present on admission)  Assessment & Plan  Multiple doses of diltiazem in ER  Will start diltiazem drip for HR goals < 120  Heparin drip ongoing    Primary lung squamous cell carcinoma, left (HCC)- (present on admission)  Assessment & Plan  Stage IIB  Undergoing chemotherapy and daily radiation therapy  Last chemo was 2 weeks ago      VTE prophylaxis: SCDs/TEDs and therapeutic anticoagulation with Heparin infusion.    Patient is critically ill at this time requiring active titration of heparin infusion for submassive PE with high YONNY score with high risk of decompensation as well as needing diltiazem infusion for atrial fibrillation with RVR.  I have assessed and reassessed the blood pressure, hemodynamics, cardiovascular status. This patient remains at high risk for worsening cardiopulmonary dysfunction and death without the above critical care interventions.    Critical care time = 100 minutes.

## 2022-10-05 PROBLEM — I82.4Z2 ACUTE DEEP VEIN THROMBOSIS (DVT) OF DISTAL VEIN OF LEFT LOWER EXTREMITY (HCC): Status: ACTIVE | Noted: 2022-01-01

## 2022-10-05 NOTE — PROGRESS NOTES
Patient was brought to [facility] [place] and has received 26 / 30 treatments.  Patient will return tomorrow for further treatment.

## 2022-10-05 NOTE — ASSESSMENT & PLAN NOTE
Cancer as likely etiology  Anticoagulation. With Xarelto   10/4 US LE:   Acute to subacute, occlusive thrombus in one of left paired posterior    tibial vein proximal calf and both pair at the ankle as well as one paired    of peroneal vein.   No right deep venous thrombosis.

## 2022-10-05 NOTE — PROGRESS NOTES
Back from radiation, 100% spo2 on 5L, bp 119/78, -137 afib while getting her settled, RR 29. No SOB or pain at this time

## 2022-10-05 NOTE — CARE PLAN
Problem: Bronchoconstriction  Goal: Improve in air movement and diminished wheezing  Description: Target End Date:  2 to 3 days    1.  Implement inhaled treatments  2.  Evaluate and manage medication effects  Outcome: Progressing       Respiratory Update    Treatment modality: Duoneb,   Frequency: q4h    Pt tolerating current treatments well with no adverse reactions.

## 2022-10-05 NOTE — ASSESSMENT & PLAN NOTE
Now on prednisone  Will need inhalers for home.  Pulmonary consult appreciated  Duo nebs  RT per protocol  oxygen via nasal cannula normally on 3 to 3-1/2 L at home

## 2022-10-05 NOTE — CARE PLAN
The patient is Stable - Low risk of patient condition declining or worsening    Shift Goals  Clinical Goals: HR control/conversion to sinus, therapeutic heparin level  Patient Goals: sleep, resolved SOB, walk  Family Goals: [id number]    Progress made toward(s) clinical / shift goals:  pain well controlled with tylenol, vss, back to 3.5L o2  Problem: Pain - Standard  Goal: Alleviation of pain or a reduction in pain to the patient’s comfort goal  Outcome: Progressing       Patient is not progressing towards the following goals:

## 2022-10-05 NOTE — DISCHARGE PLANNING
Case Management Discharge Planning    Admission Date: 10/3/2022  GMLOS: 4.1  ALOS: 2    6-Clicks ADL Score: 23  6-Clicks Mobility Score: 21      Anticipated Discharge Dispo: Discharge Disposition: D/T to facility providing halfway/supportive care (04)  Discharge Address: [address] [place] [id number]  Discharge Contact Phone Number: [phone]    DME Needed: No    Action(s) Taken: DC Assessment Complete (See below) and OTHER, refer to PFA for SSI processing    Escalations Completed: Insurance     Medically Clear: No    Next Steps: Continue medical management in the ICU, care team to Consult Palliative Care for GOC discussion.    Barriers to Discharge: Medical clearance    Is the patient up for discharge tomorrow: No    63 y/o woman w/ PMHx of stage IIb NSCL cancer (dx 8/2022, currently receiving chemo and radiation), COPD, pA-fib who was admitted on 10/3 for b/l pulmonary emobi and was found to be in atrial fibrillation w/ RVR.  PE tx w/ heparin gtt, a-fib was treated w/ diltiazem gtt, but on PM of 10/3, she converted into atrial flutter w/ RVR and was started on amiodarone.    Code status: Full code  Speaker met with the patient at bedside, she was staying at the [facility] since August 29 for outpatient chemo/rdx tx.  This round of chemo/rdx is anticipated to be completed 10/10/22.  Her sister Aydee and her  Daryn are assisting as needed.  They are requesting assistance with SSI processing, speaker has referred to PFA for followup.  She lives in a motor home with four steps for entry with unilateral rail for entry.  She resides there with her boyfriend Ramírez who provides assistance as needed and transportation.  She has 02 @ 3.5 L/nc at home which is serviced by [facility].  She requires some assistance with ADL's.  She is anticipating discharging to [facility] living in [place] which is managed by her sister Aydee.    [address] [place] [address]  [id number]  PCP: Jazmine Doan  ([facility])  Insurance: Medicaid FFS  [facility]: [facility]

## 2022-10-05 NOTE — ASSESSMENT & PLAN NOTE
On Xarelto  Has home oxygen arranged.  Discussed with Cardiologist 10/9 and will continue current management.  Echocardiogram reviewed  Patient in no acute pulmonary distress currently  Initiate oral anticoagulation and discontinue heparin drip  10/4 Echocardiogram  CONCLUSIONS  Mildly reduced left ventricular systolic function. The ejection   fraction is measured to be 40 to 45% by Cunningham's biplane.  Dilated right ventricle with reduced right ventricular systolic   function, abnormal TAPSE 1.4 cm.  Enlarged right atrium.  Dilated inferior vena cava without inspiratory collapse.  Moderate mitral regurgitation.  Mild to moderate tricuspid regurgitation.  Mild pulmonic insufficiency.  No pericardial effusion.  Right and left fibrinous pleural effusions present.  10/8 Limited echocardiogram results:  CONCLUSIONS  Mild concentric left ventricular hypertrophy. Mildly reduced left   ventricular systolic function. The left ventricular ejection fraction   is visually estimated to be 45%.  Normal right ventricular size and reduced systolic function; TAPSE   1.2cm.

## 2022-10-05 NOTE — CARE PLAN
The patient is Watcher - Medium risk of patient condition declining or worsening    Shift Goals  Clinical Goals: Hemodynamic stabilty, monitor xa value.  Patient Goals: Rest, pain control  Family Goals: NA      Progress made toward(s) clinical / shift goals:      Problem: Pain - Standard  Goal: Alleviation of pain or a reduction in pain to the patient’s comfort goal  Outcome: Progressing     Problem: Knowledge Deficit - Standard  Goal: Patient and family/care givers will demonstrate understanding of plan of care, disease process/condition, diagnostic tests and medications  Outcome: Progressing     Problem: Hemodynamics  Goal: Patient's hemodynamics, fluid balance and neurologic status will be stable or improve  Outcome: Progressing     Problem: Respiratory  Goal: Patient will achieve/maintain optimum respiratory ventilation and gas exchange  Outcome: Progressing       Patient is not progressing towards the following goals:

## 2022-10-05 NOTE — DISCHARGE PLANNING
Case Management Discharge Planning    Admission Date: 10/3/2022  GMLOS: 4.1  ALOS: 2    6-Clicks ADL Score: 23  6-Clicks Mobility Score: 21      Anticipated Discharge Dispo: Discharge Disposition: D/T to facility providing intermediate/supportive care (04)  Discharge Address: [address] [place] [address]  Discharge Contact Phone Number: [phone]    DME Needed: No    Action(s) Taken: DC Assessment Complete (See below) and OTHER, refer to PFA for SSI processing    Escalations Completed: Insurance     Medically Clear: No    Next Steps: Rounded with care team. Continue medical management in the ICU, care team to Consult Palliative Care for GOC discussion.    Barriers to Discharge: Medical clearance    Is the patient up for discharge tomorrow: No    61 y/o woman w/ PMHx of stage IIb NSCL cancer (dx 8/2022, currently receiving chemo and radiation), COPD, pA-fib who was admitted on 10/3 for b/l pulmonary emobi and was found to be in atrial fibrillation w/ RVR.  PE tx w/ heparin gtt, a-fib was treated w/ diltiazem gtt, but on PM of 10/3, she converted into atrial flutter w/ RVR and was started on amiodarone.    Code status: Full code    Speaker met with the patient at bedside, she was staying at the [facility] since August 29 for outpatient chemo/rdx tx.  This round of chemo/rdx is anticipated to be completed 10/10/22.  Her sister Aydee and her  Daryn are assisting as needed.  They are requesting assistance with SSI processing, speaker has referred to PFA for followup.  She lives in a motor home with four steps for entry with unilateral rail for entry.  She resides there with her boyfriend Ramírez who provides assistance as needed and transportation.  She has 02 @ 3.5 L/nc at home which is serviced by [facility].  She requires some assistance with ADL's.  She is anticipating discharging to [facility] living in [place] which is managed by her sister Aydee.    [address] [place] [address],  [place]  [address]  PCP: Jazmine Doan ([facility])  Insurance: Medicaid FFS  Ph[facility]acy: [facility]

## 2022-10-05 NOTE — ON TREATMENT VISIT
ON TREATMENT NOTE  RADIATION ONCOLOGY DEPARTMENT    Patient name:  Geena Richey    Primary Physician:  Pcp Pt States None MRN: [id number]  CSN: [id number]   Referring physician:  No ref. provider found : 1960, 62 y.o.     ENCOUNTER DATE:  10/05/22    DIAGNOSIS:    Primary lung squamous cell carcinoma, left (HCC)  Staging form: Lung, AJCC 8th Edition  - Clinical: Stage IIB (cT2b, cN1, cM0) - Signed by Nicole Newell M.D. on 2022  Histologic grade (G): G2  Histologic grading system: 4 grade system      TREATMENT SUMMARY:  Aria Treatment Information          Some values may be hidden. Unless noted otherwise, only the newest values recorded on each date are displayed.           Aria Treatment Summary 10/5/22   Course First Treatment Date 2022   Course Last Treatment Date 10/05/2022   L Lung,Hilum Plan from Course C2_L_lung/med   Fraction 26 of 30   Elapsed Course Days 37 @ [id number]   Prescribed Fraction Dose 200 cGy   Prescribed Total Dose 6,000 cGy   L Lung,Hilum Reference Point from Course C2_L_lung/med   Elapsed Course Days 37 @ [id number]   Session Dose 200 cGy   Total Dose 5,200 cGy   L Lung,Hilum CP Reference Point from Course C2_L_lung/med   Elapsed Course Days  @ [id number]   Session Dose 210 cGy   Total Dose 5,459 cGy   2ndCheckPlan Plan from Course C1   Cylinder Plan from Course C1   Cylinder+.5 Reference Point from Course C1   calc pt Reference Point from Course C1                SUBJECTIVE:   Patient has been in the hospital this week with chest pain.  She states that the chest pain feels much better today.    VITAL SIGNS:       No flowsheet data found.       PHYSICAL EXAM:  Physical Exam  Pulmonary:      Breath sounds: Normal breath sounds.        TOXICITY  Toxicity Assessment 10/5/2022 2022 2022 2022 2022 2022 2022   Toxicity Assessment Chest Chest Chest Chest Chest Chest Chest   Fatigue (lethargy, malaise, asthenia) Moderate (e.g.,  decrease in performance status by 1 ECOG level or 20% Karn[place]sky) or causing difficulty performing some activities Moderate (e.g., decrease in performance status by 1 ECOG level or 20% Karn[place]sky) or causing difficulty performing some activities Moderate (e.g., decrease in performance status by 1 ECOG level or 20% Karn[place]sky) or causing difficulty performing some activities Moderate (e.g., decrease in performance status by 1 ECOG level or 20% Karn[place]sky) or causing difficulty performing some activities Moderate (e.g., decrease in performance status by 1 ECOG level or 20% Karn[place]sky) or causing difficulty performing some activities Increased fatigue over baseline, but not altering normal activities Moderate (e.g., decrease in performance status by 1 ECOG level or 20% Karn[place]sky) or causing difficulty performing some activities   Radiation Dermatitis None None Faint erythema or dry desquamation None None None None   Anorexia None None None None None None Loss of appetite   Dyspepsia/heartburn None None None Severe Severe None None   Dysphagia, Esophagitis, odynophagoa (painful swallowing) None None None None Mild dysphagia, but can eat regular diet None None   RT Dysphagia-Esophageal None None None None None None None   Cough Requiring narcotic antitussive Absent Absent Mild, relieved by non-prescription medication Absent Mild, relieved by non-prescription medication Mild, relieved by non-prescription medication   Dyspnea Dyspnea at normal level of activity Dyspnea at rest or requiring ventilator support Dyspnea at rest or requiring ventilator support Dyspnea at rest or requiring ventilator support Normal Dyspnea on exertion Dyspnea on exertion         IMPRESSION:  Cancer Staging  Primary lung squamous cell carcinoma, left (HCC)  Staging form: Lung, AJCC 8th Edition  - Clinical: Stage IIB (cT2b, cN1, cM0) - Signed by Nicole Newell M.D. on 8/16/2022      PLAN:  No change in treatment plan    Disposition:  Treatment plan  reviewed. Questions answered. Continue therapy outlined.     Renny Smith M.D.    No orders of the defined types were placed in this encounter.

## 2022-10-05 NOTE — PROGRESS NOTES
Monitor summary  X/.08/x  Afib 114-141, some PVC's and occasional 5-6 beats in a row  12 hour chart check complete

## 2022-10-05 NOTE — CONSULTS
Hospital Medicine Consultation    Date of Service  10/5/2022    Referring Physician  Gaetano Galeas M.D.    Consulting Physician  Álvaro Ruiz D.O.    Reason for Consultation  Transition of care out of ICU    Chief Complaint:  Shortness of breath    History of Presenting Illness  62 y.o. female with a history of nonsmall cell lung cancer stage IIb diagnosed 8/2022 undergoing radiation and chemotherapy.  She also has additional history of COPD, paroxysmal atrial fibrillation.  Patient was brought in with increasing shortness of breath from her primary care.  Patient was found to have bilateral pulmonary emboli with atrial fibrillation and RVR.  She was initiated on anticoagulation and a diltiazem drip.  She was admitted initially to the ICU.  Patient was initiated on amiodarone drip.  Lower extremity ultrasound was positive for left leg dvt.    10/5: hgb:9.5, Na:132, No chest pain and breathing okay.  Ongoing cough.  Speech clear.  No hemoptysis.  I discussed avoidance of NSAIDs and aspirin while on blood thinners.  Advised patient no contact activities/sports while on blood thinners.    Review of Systems  Review of Systems   Constitutional:  Negative for chills and fever.   HENT:  Negative for nosebleeds.    Eyes:  Negative for discharge.   Respiratory:  Positive for cough. Negative for hemoptysis, shortness of breath and stridor.    Cardiovascular:  Negative for chest pain, palpitations and leg swelling.   Gastrointestinal:  Negative for abdominal pain, diarrhea, nausea and vomiting.   Genitourinary:  Negative for dysuria and hematuria.   Musculoskeletal:  Negative for back pain and joint pain.   Skin:  Negative for rash.   Neurological:  Negative for dizziness, sensory change, speech change and headaches.   Psychiatric/Behavioral:  Negative for depression. The patient is not nervous/anxious.      Past Medical History   has a past medical history of Arthritis, Cancer (HCC) (2014), Cholesterol blood decreased,  and Other specified symptom associated with female genital organs.    Surgical History   has a past surgical history that includes gyn surgery (1980's); dilation and curettage (8/14/2014); hysterectomy robotic xi (9/25/2014); lymph node dissection robotic xi (9/25/2014); omentectomy (9/25/2014); pr bronchoscopy,diagnostic (N/A, 8/10/2022); and endobronchial us add-on (N/A, 8/10/2022).    Family History  No history of pulmonary embolism nor DVT's with mom and dad.    Social History   reports that she quit smoking about 2 months ago. Her smoking use included cigarettes. She has a 17.50 pack-year smoking history. She has never used smokeless tobacco. She reports current alcohol use. She reports that she does not use drugs. .    Medications  Current Facility-Administered Medications   Medication Dose Route Frequency Provider Last Rate Last Admin    rivaroxaban (XARELTO) tablet 15 mg  15 mg Oral BID WITH MEALS Álvaro Ruiz D.O.        Followed by    [START ON 10/26/2022] rivaroxaban (XARELTO) tablet 20 mg  20 mg Oral PM MEAL Álvaro Ruiz D.O.        amiodarone (Cordarone) tablet 400 mg  400 mg Oral TWICE DAILY Álvaro Ruiz D.O.        benzonatate (TESSALON) capsule 100 mg  100 mg Oral TID PRN Benedicto Casiano D.O.   100 mg at 10/05/22 0901    Respiratory Therapy Consult   Nebulization Continuous RT Paula Mohan M.D.        ipratropium-albuterol (DUONEB) nebulizer solution  3 mL Nebulization Q4HRS (RT) Paula Mohan M.D.   3 mL at 10/05/22 1446    ipratropium-albuterol (DUONEB) nebulizer solution  3 mL Nebulization Q2HRS PRN (RT) Paula Mohan M.D.        methylPREDNISolone (SOLU-MEDROL) 40 MG injection 40 mg  40 mg Intravenous Q8HRS Paula Mohan M.D.   40 mg at 10/05/22 1449    senna-docusate (PERICOLACE or SENOKOT S) 8.6-50 MG per tablet 2 Tablet  2 Tablet Oral BID Paula Mohan M.D.        And    polyethylene glycol/lytes (MIRALAX) PACKET 1 Packet  1 Packet Oral QDAY JERICA HART  CELY Mohan        And    magnesium hydroxide (MILK OF MAGNESIA) suspension 30 mL  30 mL Oral QDAY PRN Paula Mohan M.D.        And    bisacodyl (DULCOLAX) suppository 10 mg  10 mg Rectal QDAY PRN Paula Mohan M.D.        MD Alert...ICU Electrolyte Replacement per Pharmacy   Other PHARMACY TO DOSE Paula Mohan M.D.        acetaminophen (Tylenol) tablet 650 mg  650 mg Oral Q6HRS PRN Paula Mohan M.D.   650 mg at 10/05/22 0901    ondansetron (ZOFRAN) syringe/vial injection 4 mg  4 mg Intravenous Q4HRS PRN Paula Mohan M.D.        ondansetron (ZOFRAN ODT) dispertab 4 mg  4 mg Oral Q4HRS PRN Paula Mohan M.D.        melatonin tablet 5 mg  5 mg Oral Nightly Paula Mohan M.D.   5 mg at 10/04/22 2215    traZODone (DESYREL) tablet 50 mg  50 mg Oral QHS PRN Paula Mohan M.D.   50 mg at 10/04/22 2220       Allergies  No Known Allergies    Physical Exam  Temp:  [35.7 °C (96.3 °F)-36.1 °C (97 °F)] 35.7 °C (96.3 °F)  Pulse:  [] 131  Resp:  [18-37] 20  BP: (110-160)/(70-99) 110/74  SpO2:  [89 %-98 %] 94 %    Physical Exam  Vitals reviewed.   Constitutional:       Appearance: Normal appearance. She is not diaphoretic.   HENT:      Head: Normocephalic and atraumatic.      Comments: Chemo induced alopecia     Nose: Nose normal.      Mouth/Throat:      Mouth: Mucous membranes are moist.      Pharynx: No oropharyngeal exudate.   Eyes:      General: No scleral icterus.        Right eye: No discharge.         Left eye: No discharge.      Extraocular Movements: Extraocular movements intact.      Conjunctiva/sclera: Conjunctivae normal.   Cardiovascular:      Rate and Rhythm: Normal rate and regular rhythm.      Pulses:           Radial pulses are 2+ on the right side and 2+ on the left side.        Dorsalis pedis pulses are 2+ on the right side and 2+ on the left side.      Heart sounds: Murmur heard.   Pulmonary:      Effort: Pulmonary effort is normal. No respiratory distress.       Breath sounds: Normal breath sounds. No wheezing or rales.   Abdominal:      General: Bowel sounds are normal. There is no distension.      Palpations: Abdomen is soft.      Tenderness: There is no abdominal tenderness.   Musculoskeletal:         General: No swelling or tenderness.      Cervical back: Neck supple. No tenderness. No muscular tenderness.      Right lower leg: No edema.      Left lower leg: No edema.   Lymphadenopathy:      Cervical: No cervical adenopathy.   Skin:     Coloration: Skin is not jaundiced or pale.   Neurological:      General: No focal deficit present.      Mental Status: She is alert and oriented to person, place, and time. Mental status is at baseline.      Cranial Nerves: No cranial nerve deficit.   Psychiatric:         Mood and Affect: Mood normal.         Behavior: Behavior normal.       Fluids  Date 10/05/22 0700 - 10/06/22 0659   Shift 2546-6248 9470-6183 1605-8833 24 Hour Total   INTAKE   I.V. 344.8   344.8   Shift Total 344.8   344.8   OUTPUT   Shift Total       Weight (kg) 89.3 89.3 89.3 89.3       Laboratory  Recent Labs     10/03/22  1419 10/04/22  0116 10/05/22  0503   WBC 4.0* 4.1* 5.7   RBC 2.98* 2.79* 3.00*   HEMOGLOBIN 9.7* 8.9* 9.5*   HEMATOCRIT 28.7* 26.9* 28.7*   MCV 96.3 96.4 95.7   MCH 32.6 31.9 31.7   MCHC 33.8 33.1* 33.1*   RDW 52.1* 52.6* 53.1*   PLATELETCT 142* 159* 217   MPV 11.9 12.2 12.2     Recent Labs     10/04/22  0116 10/04/22  1555 10/05/22  0503   SODIUM 131* 132* 132*   POTASSIUM 4.3 4.1 4.0   CHLORIDE 98 97 97   CO2 19* 22 22   GLUCOSE 197* 200* 204*   BUN 18 17 17   CREATININE 0.63 0.51 0.52   CALCIUM 7.9* 8.8 8.7     Recent Labs     10/03/22  1905   APTT 22.6*   INR 1.36*                 Imaging  IR-US GUIDED PIV   Final Result    Ultrasound-guided PERIPHERAL IV INSERTION performed by    qualified nursing staff as above.      US-EXTREMITY VENOUS LOWER BILAT   Final Result      EC-ECHOCARDIOGRAM COMPLETE W/ CONT   Final Result      CT-CTA CHEST  PULMONARY ARTERY W/ RECONS   Final Result         Acute bilateral pulmonary emboli, right greater than left, as detailed above with borderline findings of right heart strain, age indeterminate.      Bilateral lower lobe peripheral consolidations with central areas of lucency, most likely related to pulmonary infarcts.      Improved aeration within the left lung with likely improving central obstructing hilar mass.      New small right pleural effusion. Small-to-moderate left pleural effusion is mildly increased.      Mild adenopathy again noted.      Splenic infarcts again noted.      Fleischner Society pulmonary nodule recommendations:   Not applicable      These findings were discussed with LEYDA CASAS on 10/3/2022 6:30 PM.      DX-CHEST-PORTABLE (1 VIEW)   Final Result      1.  New RIGHT lung base consolidation peripherally with probable associated pleural fluid.   2.  Improvement of LEFT lung base consolidation.   3.  LEFT perihilar infiltrate or mass again seen.          Assessment/Plan  No new Assessment & Plan notes have been filed under this hospital service since the last note was generated.  Service: Hospital Medicine

## 2022-10-05 NOTE — ASSESSMENT & PLAN NOTE
10/5 discontinue IV amiodarone drip and placed on oral amiodarone 400mg bid  10/8 stopped amiodarone and coreg. Added metoprolol.   Monitor vitals and on telemetry  oral anticoagulant  10/8 normal TSH

## 2022-10-06 PROBLEM — I26.99 PULMONARY INFARCT (HCC): Status: ACTIVE | Noted: 2022-01-01

## 2022-10-06 PROBLEM — I27.20 PULMONARY HYPERTENSION (HCC): Status: ACTIVE | Noted: 2022-01-01

## 2022-10-06 PROBLEM — I50.20 HFREF (HEART FAILURE WITH REDUCED EJECTION FRACTION) (HCC): Status: ACTIVE | Noted: 2022-01-01

## 2022-10-06 PROBLEM — I26.09 ACUTE PULMONARY EMBOLISM WITH ACUTE COR PULMONALE (HCC): Status: ACTIVE | Noted: 2022-01-01

## 2022-10-06 NOTE — RESPIRATORY CARE
COPD EDUCATION by COPD CLINICAL EDUCATOR  10/6/2022  at  3:06 PM by Eryn Chambers RRT     Patient interviewed by COPD education team.  Patient declined to participate in full program.  A short intervention has been conducted.  A comprehensive packet including information about COPD, types of treatments to manage their disease and safe home Oxygen usage was provided and reviewed with patient at the bedside.  Patient given a spacer with instruction, and we reviewed her respiratory medications and using the flutter device.       Smoking Cessation Intervention and education completed, 5 minutes spent on smoking cessation education with patient. Patient recently quit smoking, congratulated patient and encouraged continued abstinence.        Patient is eligible and may be interested in RPM, order requested.     COPD Screen  COPD Risk Screening  Do you have a history of COPD?: Yes  Do you have a Pulmonologist?: No  COPD Population Screener  During the past 4 weeks, how much did you feel short of breath?: Most  or all of the time  Do you ever cough up any mucus or phlegm?: Yes, every day  In the past 12 months, you do less than you used to because of your breathing problems: Strongly agree  Have you smoked at least 100 cigarettes in your entire life?: Yes  How old are you?: 60+  COPD Screening Score: 10  COPD Coordinator Recommended: Yes    COPD Assessment  COPD Clinical Specialists ONLY  COPD Education Initiated: Yes--Short Intervention (Given COPD booklet and spacer, declined full, may be eligible for RPM upon discharge, order requested.)  DME Company: [employer]  DME Equipment Type: O2 @ 3.5 L  Physician Name: provided information about getting established with PCP at [facility]  Pulmonologist Name: given list of local pulmonary clinics  Referrals Initiated: Yes  Pulmonary Rehab: Yes  Palliative: Yes (referral placed, has not been seen)  Smoking Cessation: Yes  $ Smoking Cessation 3-10 Minutes: Asymptomatic (5 min)  Smoking Pack  "Years: 18  Hospice: N/A  Home Health Care:  (TBD)  [facility] Outreach: N/A  [facility]: N/A  [facility]: N/A (lives outside of service area)  Private In-Home Care Agency: N/A  Is this a COPD exacerbation patient?: Yes (per H&P, order set used, IP Pulmonary saw 10/6/2022)  $ Demo/Eval of SVN's, MDI's and Aerosols: Yes (given spacer)  (OP) Pulmonary Function Testing: Yes (done @ [facility], results unavailable)    PFT Results    No results found for: PFT    Meds to Beds  Would the patient like to opt in for Bedside Medication Delivery at Discharge?: Yes, interested     MY COPD ACTION PLAN     It is recommended that patients and physicians /healthcare providers complete this action plan together. This plan should be discussed at each physician visit and updated as needed.    The green, yellow and red zones show groups of symptoms of COPD. This list of symptoms is not comprehensive, and you may experience other symptoms. In the \"Actions\" column, your healthcare provider has recommended actions for you to take based on your symptoms.    Patient Name: Geena Richey   YOB: 1960   Last Updated on: 8/10/2022 11:38 AM   Green Zone:  I am doing well today Actions     Usual activitiy and exercise level   Take daily medications     Usual amounts of cough and phlegm/mucus   Use oxygen as prescribed     Sleep well at night   Continue regular exercise/diet plan     Appetite is good   At all times avoid cigarette smoke, inhaled irritants     Daily Medications (these medications are taken every day):   Budesonide-Formoterol Fumarate (Symbicort) 2 Puffs Twice daily     Additional Information:  Use Spacer with medication and rinse mouth after taking medication    Yellow Zone:  I am having a bad day or a COPD flare Actions     More breathless than usual   Continue daily medications     I have less energy for my daily activities   Use quick relief inhaler as ordered     Increased or thicker " "phlegm/mucus   Use oxygen as prescribed     Using quick relief inhaler/nebulizer more often   Get plenty of rest     Swelling of ankles more than usual   Use pursed lip breathing     More coughing than usual   At all times avoid cigarette smoke, inhaled irritants     I feel like I have a \"chest cold\"     Poor sleep and my symptoms woke me up     My appetite is not good     My medicine is not helping      Call provider immediately if symptoms don’t improve     Continue daily medications, add rescue medications:   Albuterol 2 Puffs Every 4 hours PRN       Medications to be used during a flare up, (as Discussed with Provider):           Additional Information:  Use spacer with Albuterol rescue inhaler.     Call provider if symptoms do not improve.    Red Zone:  I need urgent medical care Actions     Severe shortness of breath even at rest   Call 911 or seek medical care immediately     Not able to do any activity because of breathing      Fever or shaking chills      Feeling confused or very drowsy       Chest pains      Coughing up blood                  "

## 2022-10-06 NOTE — ASSESSMENT & PLAN NOTE
Multifactorial in the setting of COPD, possibly an exacerbation, pulmonary embolism, pulmonary infarction, and pulmonary embolism    Continue with oxygen, wean as tolerated  Incentive spirometry and chest physiotherapy  Mobilization as tolerated by patient  Plan for DC with home oxygen

## 2022-10-06 NOTE — ASSESSMENT & PLAN NOTE
Patient last received her chemotherapy about 2 weeks ago, she reports that it was stopped due to a low white blood cell count  She is continuing with radiation therapy    Incentive spirometry and chest physiotherapy daily to help patient clear her airways  Continue with chemoradiation per team

## 2022-10-06 NOTE — PROGRESS NOTES
Assumed care, bedside report received from Moreno K[facility]. Pt. Is Afib on the monitor. Initial assessment completed, orders reviewed, call light within reach, bed alarm is in use, and hourly rounding in place. POC addressed with patient, no additional questions at this time.

## 2022-10-06 NOTE — CARE PLAN
Problem: Bronchoconstriction  Goal: Improve in air movement and diminished wheezing  Description: Target End Date:  2 to 3 days    1.  Implement inhaled treatments  2.  Evaluate and manage medication effects  Outcome: Progressing  Flowsheets (Taken 10/6/2022 0436 by Sho Corrigan, ISS[facility])  Bronchodilator Goals/Outcome: Patient at Stable Baseline  Bronchodilator Indications: History / Diagnosis       Respiratory Update    Treatment modality: Xopenex and Atrovent  Frequency: QID    Pt tolerating current treatments well with no adverse reactions.    Problem: Bronchopulmonary Hygiene  Goal: Increase mobilization of retained secretions  Description: Target End Date:  2 to 3 days    1.  Perform bronchopulmonary therapy as indicated by assessment  2.  Perform airway suctioning  3.  Perform actions to maintain patient airway  Outcome: Progressing  Flowsheets (Taken 10/6/2022 1142)  Bronchopulmonary Hygiene Indications: Evidence of Retained Secretions  Bronchopulmonary Hygiene Outcomes: Patient at stable baseline       Respiratory Update    Treatment modality: CPT  Frequency: QID    Pt tolerating current treatments well with no adverse reactions.

## 2022-10-06 NOTE — PROGRESS NOTES
Monitor Summary:   Rhythm: Afib/Flutter  Rate: 125-140  Measurement: .NA/.10/.NA  Ectopy: PVC's

## 2022-10-06 NOTE — PROGRESS NOTES
Pulmonary Progress Note    Date of admission  10/3/2022    Chief Complaint  62 y.o. 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  Patient had a positve 10 L lymph node.  She has been undergoing radiation therapy starting 8/16, and had completed 37 of her 40 treatments prior to her admission.  Patient states that over the past couple weeks she has had increasing shortness of breath.  Review of her CT scan shows that she has diffuse emphysema with increased interstitial groundglass, concerning for pulmonary edema.  Echocardiogram shows a mildly reduced EF of 40 to 45%, but notably patient has a dilated RV with RV systolic dysfunction.    CT also showing pulmonary embolism with 7 cm cavitary consolidation in the right middle lobe, possible infarct, and tracheal debris.  Furthermore, she has a new right-sided pleural effusion.   The atelectasis of the left upper lobe was seen on CT on 8/14 is improved, pleural effusion on the left side persists and is loculated in appearance.  Patient reports that she uses as needed nebulizers for her COPD, and does not have a pulmonologist. She continues to smoke.  Today, she reports that she is having increased shortness of breath, would like a nebulizer treatment.  She states that overall she feels improved.  She also reports that she has had an increase dry cough, related to radiation therapy, this is consistent as she has tracheal debris    Interval Problem Update  Reviewed last 24 hour events:  Patient had XRT yesterday   CT scans  Echo    Review of Systems  Review of Systems   Constitutional:  Negative for chills and fever.   Respiratory:  Positive for cough, sputum production and shortness of breath. Negative for wheezing.    Cardiovascular:  Positive for palpitations.    Genitourinary:  Negative for frequency and urgency.   Musculoskeletal: Negative.    Neurological: Negative.    Psychiatric/Behavioral: Negative.        Vital Signs for last 24 hours   Temp:  [35.8 °C (96.5 °F)-36.4 °C (97.5 °F)] 36.4 °C (97.5 °F)  Pulse:  [] 98  Resp:  [16-28] 16  BP: (102-127)/(67-79) 105/67  SpO2:  [91 %-97 %] 92 %      Physical Exam   Physical Exam  Constitutional:       Appearance: Normal appearance.   Eyes:      Conjunctiva/sclera: Conjunctivae normal.   Cardiovascular:      Rate and Rhythm: Normal rate and regular rhythm.   Pulmonary:      Effort: Respiratory distress present.      Breath sounds: No stridor. No wheezing.      Comments: Patient is tachypneic with mild pursed lip breathing  Abdominal:      Palpations: Abdomen is soft.   Skin:     General: Skin is warm and dry.   Neurological:      Mental Status: She is oriented to person, place, and time.   Psychiatric:         Mood and Affect: Mood normal.         Behavior: Behavior normal.       Medications  Current Facility-Administered Medications   Medication Dose Route Frequency Provider Last Rate Last Admin    levalbuterol (XOPENEX) 1.25 MG/3ML nebulizer solution 1.25 mg  1.25 mg Nebulization 4X/DAY (RT) Álvaro Ruiz D.O.        ipratropium (ATROVENT) 0.02 % nebulizer solution 0.5 mg  0.5 mg Nebulization 4X/DAY (RT) Álvaro Ruiz D.O.   0.5 mg at 10/06/22 0717    levalbuterol (XOPENEX) 1.25 MG/3ML nebulizer solution 1.25 mg  1.25 mg Nebulization Q2HRS PRN (RT) Álvaro Ruiz D.O.        ipratropium (ATROVENT) 0.02 % nebulizer solution 0.5 mg  0.5 mg Nebulization Q2HRS PRN (RT) Álvaro Ruiz D.O.        rivaroxaban (XARELTO) tablet 15 mg  15 mg Oral BID WITH MEALS Álvaro Ruiz D.O.   15 mg at 10/06/22 0650    Followed by    [START ON 10/26/2022] rivaroxaban (XARELTO) tablet 20 mg  20 mg Oral PM MEAL Álvaro Ruiz D.O.        amiodarone (Cordarone) tablet 400 mg  400 mg Oral TWICE DAILY Álvaro Ruiz D.O.   400 mg at  10/06/22 0439    Metoprolol Tartrate (LOPRESSOR) injection 5 mg  5 mg Intravenous Q5 MIN PRN Linsey M Wegener, A.P.R.NBrianda   5 mg at 10/06/22 0651    benzonatate (TESSALON) capsule 100 mg  100 mg Oral TID PRN Benedicto Casiano D.O.   100 mg at 10/05/22 0901    Respiratory Therapy Consult   Nebulization Continuous RT Paula Mohan M.D.        methylPREDNISolone (SOLU-MEDROL) 40 MG injection 40 mg  40 mg Intravenous Q8HRS Paula Mohan M.D.   40 mg at 10/06/22 0439    senna-docusate (PERICOLACE or SENOKOT S) 8.6-50 MG per tablet 2 Tablet  2 Tablet Oral BID Paula Mohan M.D.        And    polyethylene glycol/lytes (MIRALAX) PACKET 1 Packet  1 Packet Oral QDAY PRN Paula Mohan M.D.        And    magnesium hydroxide (MILK OF MAGNESIA) suspension 30 mL  30 mL Oral QDAY PRN Paula Mohan M.D.        And    bisacodyl (DULCOLAX) suppository 10 mg  10 mg Rectal QDAY PRN Paula Mohan M.D.        acetaminophen (Tylenol) tablet 650 mg  650 mg Oral Q6HRS PRN Paula Mohan M.D.   650 mg at 10/05/22 0901    ondansetron (ZOFRAN) syringe/vial injection 4 mg  4 mg Intravenous Q4HRS PRN Paula Mohan M.D.        ondansetron (ZOFRAN ODT) dispertab 4 mg  4 mg Oral Q4HRS PRN Paula Mohan M.D.        melatonin tablet 5 mg  5 mg Oral Nightly Paula Mohan M.D.   5 mg at 10/05/22 2128    traZODone (DESYREL) tablet 50 mg  50 mg Oral QHS PRN Paula Mohan M.D.   50 mg at 10/05/22 2127       Fluids    Intake/Output Summary (Last 24 hours) at 10/6/2022 0942  Last data filed at 10/6/2022 0639  Gross per 24 hour   Intake 739.6 ml   Output 400 ml   Net 339.6 ml       Laboratory          Recent Labs     10/04/22  0116 10/04/22  1555 10/05/22  0503 10/06/22  0149   SODIUM 131* 132* 132* 132*   POTASSIUM 4.3 4.1 4.0 4.3   CHLORIDE 98 97 97 98   CO2 19* 22 22 22   BUN 18 17 17 17   CREATININE 0.63 0.51 0.52 0.52   MAGNESIUM 1.5 2.1 2.0 1.9   PHOSPHORUS 2.7  --  2.5 2.6   CALCIUM 7.9* 8.8 8.7 8.6     Recent  Labs     10/03/22  1419 10/04/22  0116 10/04/22  1555 10/05/22  0503 10/06/22  0149   ALTSGPT 39  --   --   --   --    ASTSGOT 44  --   --   --   --    ALKPHOSPHAT 269*  --   --   --   --    TBILIRUBIN 2.0*  --   --   --   --    GLUCOSE 157*   < > 200* 204* 160*    < > = values in this interval not displayed.     Recent Labs     10/03/22  1419 10/04/22  0116 10/05/22  0503 10/06/22  0149   WBC 4.0* 4.1* 5.7 6.4   NEUTSPOLYS 82.60* 81.80* 83.00* 84.40*   LYMPHOCYTES 5.20* 3.70* 3.90* 1.90*   MONOCYTES 10.40 12.30 10.80 10.50   EOSINOPHILS 0.00 0.00 0.00 0.00   BASOPHILS 0.00 0.20 0.70 0.20   ASTSGOT 44  --   --   --    ALTSGPT 39  --   --   --    ALKPHOSPHAT 269*  --   --   --    TBILIRUBIN 2.0*  --   --   --      Recent Labs     10/03/22  1905 10/04/22  0116 10/05/22  0503 10/06/22  0149   RBC  --  2.79* 3.00* 3.07*   HEMOGLOBIN  --  8.9* 9.5* 9.7*   HEMATOCRIT  --  26.9* 28.7* 29.9*   PLATELETCT  --  159* 217 263   PROTHROMBTM 16.6*  --   --   --    APTT 22.6*  --   --   --    INR 1.36*  --   --   --        Imaging  CT:    Reviewed by me, my impression   CT 10/3 -acute bilateral pulmonary emboli, R>L with right-sided 7 cm consolidation with small cavities in the right middle lobe, tracheal debris, right-sided pleural effusion which is new compared to August  Improvement of the left upper lobe atelectasis compared to August, persistent left pleural effusion which appears loculated  CT 8/14 -atelectasis of the left upper lobe, small left-sided pleural effusion    Echo 10/3  CONCLUSIONS  Mildly reduced left ventricular systolic function. The ejection   fraction is measured to be 40 to 45% by Cunningham's biplane.  Dilated right ventricle with reduced right ventricular systolic   function, abnormal TAPSE 1.4 cm.  Enlarged right atrium.  Dilated inferior vena cava without inspiratory collapse.  Moderate mitral regurgitation.  Mild to moderate tricuspid regurgitation.  Mild pulmonic insufficiency.  No pericardial  effusion.  Right and left fibrinous pleural effusions present.    Assessment/Plan  * Acute pulmonary embolism with acute cor pulmonale (HCC)- (present on admission)  Assessment & Plan  PE is provoked, patient has a DVT, related to malignancy  Echo showing dilated RV and decreased systolic function    Continue with anticoagulation  Recommend repeating echo prior to discharge  Will give a dose of Lasix today, see below    Pulmonary hypertension (HCC)  Assessment & Plan  Multifactorial, patient has a mildly reduced left ventricular systolic function, she also has chronic hypoxemia related to emphysema, at this time she has acute pulmonary emboli, which can also increase her pulmonary pressures and RV function  RVSP is only mildly elevated to 35 by echo    Lasix 40 mg IV once today as patient appears to have pulmonary edema on CT from admission, she is tachypneic and reports increasing shortness of breath  Will continue to monitor closely for any hemodynamic changes, as it is challenging to manage with fluid status of patients with RV dysfunction    Acute exacerbation of chronic obstructive pulmonary disease (COPD) (HCC)  Assessment & Plan  Unclear if patient is currently in exacerbation of COPD, as she appears to be fluid overloaded on imaging and clinically    Continue with scheduled Xopenex and ipratropium  Continue with Solu-Medrol, will decrease to every 12 hours today  Patient should be discharged with a combination LABA LAMA, as she is currently only on as needed medications    Acute hypoxemic respiratory failure (HCC)  Assessment & Plan  Multifactorial in the setting of COPD, possibly an exacerbation, pulmonary embolism, pulmonary infarction, and pulmonary embolism    Continue with oxygen, wean as tolerated  Incentive spirometry and chest physiotherapy  Mobilization as tolerated by patient    Pulmonary infarct (HCC)  Assessment & Plan  Patient has a 7 cm consolidation on the right with areas of cavitation,  likely pulmonary infarction due to pulmonary emboli, cavitary pneumonia is also on the differential, however, patient has not been febrile nor had a white count.  She was tachycardic on admission, however, that seems to be related to her pulmonary embolism.  Patient may not mount a robust immune reaction as she is immunocompromise due to chemotherapy    Continue to monitor, patient may need more oxygen due to the consolidation  Monitor for signs of infection, and would recommend starting empiric antibiotics if there is any suspicion  Respiratory culture sent      Tobacco use disorder- (present on admission)  Assessment & Plan  Discussed tobacco cessation with patient, she understands the risks of continuing to smoke    Primary lung squamous cell carcinoma, left (HCC)- (present on admission)  Assessment & Plan  Patient last received her chemotherapy about 2 weeks ago, she reports that it was stopped due to a low white blood cell count  She is continuing with radiation therapy    Incentive spirometry and chest physiotherapy daily to help patient clear her airways  Continue with chemoradiation per team         VTE:  NOAC    Discussed patient condition and risk of morbidity and/or mortality with Hospitalist and Patient    Damaris Early, DO   Pulmonary and Critical Care

## 2022-10-06 NOTE — ASSESSMENT & PLAN NOTE
Rate control atrial fibrillation to help.  Given lasix overnight but no overt leg edema   CTA chest does show pleural effusion and a right lung cavitary area (likely from PE)  10/4 Echocardiogram:  CONCLUSIONS  Mildly reduced left ventricular systolic function. The ejection   fraction is measured to be 40 to 45% by Cunningham's biplane.  Dilated right ventricle with reduced right ventricular systolic   function, abnormal TAPSE 1.4 cm.  Enlarged right atrium.  Dilated inferior vena cava without inspiratory collapse.  Moderate mitral regurgitation.  Mild to moderate tricuspid regurgitation.  Mild pulmonic insufficiency.  No pericardial effusion.  Right and left fibrinous pleural effusions present.

## 2022-10-06 NOTE — ASSESSMENT & PLAN NOTE
PE is provoked, patient has a DVT, related to malignancy  Echo showing dilated RV and decreased systolic function, repeat echo largely unchanged    Continue with anticoagulation  Patient will need a follow-up echo within the next couple of months, per cardiology

## 2022-10-06 NOTE — ASSESSMENT & PLAN NOTE
Multifactorial, patient has a mildly reduced left ventricular systolic function, she also has chronic hypoxemia related to emphysema, at this time she has acute pulmonary emboli, which can also increase her pulmonary pressures and RV function  RVSP is only mildly elevated to 35 by echo, repeat is largely unchanged    Continue with anticoagulation for pulmonary embolism  Continue with oxygen therapy

## 2022-10-06 NOTE — PROGRESS NOTES
4 Eyes Skin Assessment Completed by HATTIE Mayer and HATTIE Mello.    Head WDL  Ears Redness and Blanching  Nose WDL  Mouth WDL  Neck WDL  Breast/Chest Bruising  Shoulder Blades WDL  Spine WDL  (R) Arm/Elbow/Hand Bruising  (L) Arm/Elbow/Hand Bruising  Abdomen WDL  Groin WDL  Scrotum/Coccyx/Buttocks Redness and Blanching  (R) Leg Bruising  (L) Leg Bruising  (R) Heel/Foot/Toe WDL  (L) Heel/Foot/Toe WDL          Devices In Places Tele Box, Blood Pressure Cuff, Pulse Ox, and Nasal Cannula      Interventions In Place Gray Ear Foams, Sacral Mepilex, Pillows, Barrier Cream, and Pressure Redistribution Mattress    Possible Skin Injury No    Pictures Uploaded Into Epic N/A  Wound Consult Placed N/A  RN Wound Prevention Protocol Ordered No

## 2022-10-06 NOTE — ASSESSMENT & PLAN NOTE
Patient has a 7 cm consolidation on the right with areas of cavitation, likely pulmonary infarction due to pulmonary emboli, cavitary pneumonia is also on the differential, however, patient has not been febrile nor had a white count.  She was tachycardic on admission, however, that seems to be related to her pulmonary embolism.  Patient may not mount a robust immune reaction as she is immunocompromise due to chemotherapy    Continue to monitor, patient may need more oxygen due to the consolidation  Repeat CT in 4 to 6 weeks

## 2022-10-06 NOTE — CARE PLAN
Problem: Pain - Standard  Goal: Alleviation of pain or a reduction in pain to the patient’s comfort goal  Outcome: Progressing     Problem: Knowledge Deficit - Standard  Goal: Patient and family/care givers will demonstrate understanding of plan of care, disease process/condition, diagnostic tests and medications  Outcome: Progressing     Problem: Knowledge Deficit - COPD  Goal: Patient/significant other demonstrates understanding of disease process, utilization of the Action Plan, medications and discharge instruction  Outcome: Progressing     Problem: Fall Risk  Goal: Patient will remain free from falls  Outcome: Progressing   The patient is Stable - Low risk of patient condition declining or worsening    Shift Goals  Clinical Goals: HR control  Patient Goals: sleep, resolved SOB, walk  Family Goals: KHUSHBOO    Progress made toward(s) clinical / shift goals:  Progressing    Patient is not progressing towards the following goals:

## 2022-10-06 NOTE — PROGRESS NOTES
Hospital Medicine Daily Progress Note    Date of Service  10/6/2022    Chief Complaint  Shortness of breath    Hospital Course  Geena Richey is a 62 y.o. female with a history of nonsmall cell lung cancer stage IIb diagnosed 8/2022 undergoing radiation and chemotherapy.  She also has additional history of COPD, paroxysmal atrial fibrillation.  Patient was brought in with increasing shortness of breath from her primary care.  Patient was found to have bilateral pulmonary emboli with atrial fibrillation and RVR.  She was initiated on anticoagulation and a diltiazem drip.  She was admitted initially to the ICU.  Patient was initiated on amiodarone drip.  Lower extremity ultrasound was positive for left leg dvt.    Interval Problem Update  10/6: Patient sitting up in bed.  Remains with atrial fibrillation with a rapid rate but in no distress.  Respiratory status is stable.  She denies any hemoptysis.  Speech is clear.    I have discussed this patient's plan of care and discharge plan at IDT rounds today with Case Management, Nursing, Nursing leadership, and other members of the IDT team.    Consultants/Specialty  critical care    Code Status  Full Code    Disposition  Patient is not medically cleared for discharge.   Anticipate discharge to to home with close outpatient follow-up.  I have placed the appropriate orders for post-discharge needs.    Review of Systems  Review of Systems   Constitutional:  Negative for chills and fever.   Eyes:  Negative for discharge.   Respiratory:  Positive for cough. Negative for hemoptysis, shortness of breath and stridor.    Cardiovascular:  Negative for chest pain, palpitations and leg swelling.   Gastrointestinal:  Negative for abdominal pain, diarrhea and nausea.   Genitourinary:  Negative for dysuria and hematuria.   Musculoskeletal:  Negative for back pain and joint pain.   Skin:  Negative for rash.   Neurological:  Negative for sensory change, speech change and headaches.    Psychiatric/Behavioral:  The patient is not nervous/anxious.       Physical Exam  Temp:  [35.8 °C (96.5 °F)-36.4 °C (97.5 °F)] 36.3 °C (97.3 °F)  Pulse:  [] 64  Resp:  [16-28] 16  BP: (102-127)/(64-79) 123/64  SpO2:  [91 %-94 %] 94 %    Physical Exam  Vitals reviewed.   Constitutional:       Appearance: Normal appearance. She is not diaphoretic.      Comments: Chemo induced alopecia   HENT:      Head: Normocephalic and atraumatic.      Nose: Nose normal.      Mouth/Throat:      Mouth: Mucous membranes are moist.      Pharynx: No oropharyngeal exudate.   Eyes:      General: No scleral icterus.        Right eye: No discharge.         Left eye: No discharge.      Extraocular Movements: Extraocular movements intact.      Conjunctiva/sclera: Conjunctivae normal.   Neck:      Vascular: No carotid bruit.   Cardiovascular:      Rate and Rhythm: Tachycardia present. Rhythm irregular.      Pulses:           Radial pulses are 2+ on the right side and 2+ on the left side.        Dorsalis pedis pulses are 2+ on the right side and 2+ on the left side.      Heart sounds: No murmur heard.  Pulmonary:      Effort: Pulmonary effort is normal. No respiratory distress.      Breath sounds: Normal breath sounds. No wheezing or rales.   Abdominal:      General: Bowel sounds are normal. There is no distension.      Palpations: Abdomen is soft.   Musculoskeletal:         General: No swelling or tenderness.      Cervical back: No muscular tenderness.      Right lower leg: No edema.      Left lower leg: No edema.   Lymphadenopathy:      Cervical: No cervical adenopathy.   Skin:     Coloration: Skin is not jaundiced or pale.   Neurological:      General: No focal deficit present.      Mental Status: She is alert and oriented to person, place, and time. Mental status is at baseline.      Cranial Nerves: No cranial nerve deficit.   Psychiatric:         Mood and Affect: Mood normal.         Behavior: Behavior normal.        Fluids    Intake/Output Summary (Last 24 hours) at 10/6/2022 1447  Last data filed at 10/6/2022 1200  Gross per 24 hour   Intake 634.8 ml   Output 1000 ml   Net -365.2 ml       Laboratory  Recent Labs     10/04/22  0116 10/05/22  0503 10/06/22  0149   WBC 4.1* 5.7 6.4   RBC 2.79* 3.00* 3.07*   HEMOGLOBIN 8.9* 9.5* 9.7*   HEMATOCRIT 26.9* 28.7* 29.9*   MCV 96.4 95.7 97.4   MCH 31.9 31.7 31.6   MCHC 33.1* 33.1* 32.4*   RDW 52.6* 53.1* 55.1*   PLATELETCT 159* 217 263   MPV 12.2 12.2 12.0     Recent Labs     10/04/22  1555 10/05/22  0503 10/06/22  0149   SODIUM 132* 132* 132*   POTASSIUM 4.1 4.0 4.3   CHLORIDE 97 97 98   CO2 22 22 22   GLUCOSE 200* 204* 160*   BUN 17 17 17   CREATININE 0.51 0.52 0.52   CALCIUM 8.8 8.7 8.6     Recent Labs     10/03/22  1905   APTT 22.6*   INR 1.36*               Imaging  IR-US GUIDED PIV   Final Result    Ultrasound-guided PERIPHERAL IV INSERTION performed by    qualified nursing staff as above.      US-EXTREMITY VENOUS LOWER BILAT   Final Result      EC-ECHOCARDIOGRAM COMPLETE W/ CONT   Final Result      CT-CTA CHEST PULMONARY ARTERY W/ RECONS   Final Result         Acute bilateral pulmonary emboli, right greater than left, as detailed above with borderline findings of right heart strain, age indeterminate.      Bilateral lower lobe peripheral consolidations with central areas of lucency, most likely related to pulmonary infarcts.      Improved aeration within the left lung with likely improving central obstructing hilar mass.      New small right pleural effusion. Small-to-moderate left pleural effusion is mildly increased.      Mild adenopathy again noted.      Splenic infarcts again noted.      Fleischner Society pulmonary nodule recommendations:   Not applicable      These findings were discussed with LEYDA CASAS on 10/3/2022 6:30 PM.      DX-CHEST-PORTABLE (1 VIEW)   Final Result      1.  New RIGHT lung base consolidation peripherally with probable associated pleural fluid.    2.  Improvement of LEFT lung base consolidation.   3.  LEFT perihilar infiltrate or mass again seen.           Assessment/Plan  * Acute pulmonary embolism with acute cor pulmonale (HCC)- (present on admission)  Assessment & Plan  Echocardiogram reviewed  Patient in no acute pulmonary distress currently  Initiate oral anticoagulation and discontinue heparin drip    Pulmonary hypertension (HCC)  Assessment & Plan  Supplemental oxygen    HFrEF (heart failure with reduced ejection fraction) ([facility])  Assessment & Plan  10/4 Echocardiogram:  CONCLUSIONS  Mildly reduced left ventricular systolic function. The ejection   fraction is measured to be 40 to 45% by Cunningham's biplane.  Dilated right ventricle with reduced right ventricular systolic   function, abnormal TAPSE 1.4 cm.  Enlarged right atrium.  Dilated inferior vena cava without inspiratory collapse.  Moderate mitral regurgitation.  Mild to moderate tricuspid regurgitation.  Mild pulmonic insufficiency.  No pericardial effusion.  Right and left fibrinous pleural effusions present.    Acute deep vein thrombosis (DVT) of distal vein of left lower extremity ([facility])  Assessment & Plan  Cancer as likely etiology  Anticoagulation. With Xarelto   10/4 US LE:   Acute to subacute, occlusive thrombus in one of left paired posterior    tibial vein proximal calf and both pair at the ankle as well as one paired    of peroneal vein.   No right deep venous thrombosis.    Normocytic normochromic anemia- (present on admission)  Assessment & Plan  10/6 Hgb: 9.7<9.5 < 8.9  Monitor cbc    Tobacco use disorder- (present on admission)  Assessment & Plan  Cessation advised    Acute exacerbation of chronic obstructive pulmonary disease (COPD) ([facility])  Assessment & Plan  On Solu-Medrol 40 mg every 12 hours wean as able to tolerate  Duo nebs  RT per protocol  oxygen via nasal cannula normally on 3 to 3-1/2 L at home    Paroxysmal atrial fibrillation (HCC)- (present on admission)  Assessment &  Plan  10/5 discontinue IV amiodarone drip and placed on oral amiodarone 400mg bid  Monitor vitals and on telemetry  Initiate oral anticoagulant    Primary lung squamous cell carcinoma, left (HCC)- (present on admission)  Assessment & Plan  On radiation and chemotherapy       VTE prophylaxis: therapeutic anticoagulation with Xarelto    I have performed a physical exam and reviewed and updated ROS and Plan today (10/6/2022). In review of yesterday's note (10/5/2022), there are no changes except as documented above.

## 2022-10-06 NOTE — ASSESSMENT & PLAN NOTE
Unclear if patient is currently in exacerbation of COPD, as she appears to be fluid overloaded on imaging and clinically    Continue with scheduled Xopenex and ipratropium  Solu-Medrol started 10/3/2022, will change to prednisone 40 mg daily for 10 days  Patient should be discharged with a combination LABA LAMA ICS, as she is currently only on as needed medications, Trelegy would be easiest for patient, should be covered by Medicaid

## 2022-10-07 PROBLEM — J90 PLEURAL EFFUSION: Status: ACTIVE | Noted: 2022-01-01

## 2022-10-07 NOTE — PROGRESS NOTES
Pulmonary Progress Note    Date of admission  10/3/2022    Chief Complaint  62 y.o. female admitted 10/3/2022 with SOB    Hospital Course  Patient is a 62-year-old female with history of recently diagnosed stage IIb squamous cell carcinoma of the left mainstem bronchus, fungating mass found on bronchoscopy.  Patient had a positve 10 L lymph node.  She has been undergoing radiation therapy starting 8/16, and had completed 37 of her 40 treatments prior to her admission.  Patient states that over the past couple weeks she has had increasing shortness of breath.  Review of her CT scan shows that she has diffuse emphysema with increased interstitial groundglass, concerning for pulmonary edema.  Echocardiogram shows a mildly reduced EF of 40 to 45%, but notably patient has a dilated RV with RV systolic dysfunction.    CT also showing pulmonary embolism with 7 cm cavitary consolidation in the right middle lobe, possible infarct, and tracheal debris.  Furthermore, she has a new right-sided pleural effusion.   The atelectasis of the left upper lobe was seen on CT on 8/14 is improved, pleural effusion on the left side persists and is loculated in appearance.  Patient reports that she uses as needed nebulizers for her COPD, and does not have a pulmonologist. She continues to smoke.  Today, she reports that she is having increased shortness of breath, would like a nebulizer treatment.  She states that overall she feels improved.  She also reports that she has had an increase dry cough, related to radiation therapy, this is consistent as she has tracheal debris    10/7-patient reports that her breathing feels somewhat better today, however, she is still visibly tachypneic with pursed lip breathing.  She states that she did urinate a lot overnight with the Lasix.  She is asking to ambulate.    Interval Problem Update  Reviewed last 24 hour events:  I/Os -760 overnight    Review of Systems  Review of Systems   Constitutional:   Negative for chills and fever.   Respiratory:  Positive for cough and shortness of breath. Negative for wheezing.    Cardiovascular:  Positive for palpitations. Negative for chest pain.   Gastrointestinal: Negative.    Musculoskeletal: Negative.    Neurological: Negative.    Psychiatric/Behavioral: Negative.        Vital Signs for last 24 hours   Temp:  [36.3 °C (97.3 °F)-36.8 °C (98.2 °F)] 36.4 °C (97.5 °F)  Pulse:  [] 88  Resp:  [16-29] 28  BP: (113-138)/(61-86) 125/81  SpO2:  [90 %-99 %] 92 %    Physical Exam   Physical Exam  Constitutional:       Appearance: She is ill-appearing.   HENT:      Head: Normocephalic.   Eyes:      Conjunctiva/sclera: Conjunctivae normal.   Cardiovascular:      Rate and Rhythm: Regular rhythm. Tachycardia present.   Pulmonary:      Effort: Respiratory distress present.      Comments: Poor air movement  Abdominal:      Palpations: Abdomen is soft.   Skin:     General: Skin is warm.   Neurological:      General: No focal deficit present.      Mental Status: She is oriented to person, place, and time.   Psychiatric:         Mood and Affect: Mood normal.         Behavior: Behavior normal.       Medications  Current Facility-Administered Medications   Medication Dose Route Frequency Provider Last Rate Last Admin    furosemide (LASIX) injection 20 mg  20 mg Intravenous Once Damaris Early D.O.        predniSONE (DELTASONE) tablet 40 mg  40 mg Oral DAILY Damaris Early D.O.        levalbuterol (XOPENEX) 1.25 MG/3ML nebulizer solution 1.25 mg  1.25 mg Nebulization 4X/DAY (RT) Álvaro Ruiz D.O.   1.25 mg at 10/07/22 0642    ipratropium (ATROVENT) 0.02 % nebulizer solution 0.5 mg  0.5 mg Nebulization 4X/DAY (RT) Álvaro Ruiz D.O.   0.5 mg at 10/07/22 0642    levalbuterol (XOPENEX) 1.25 MG/3ML nebulizer solution 1.25 mg  1.25 mg Nebulization Q2HRS PRN (RT) Álvaro Ruiz D.O.        ipratropium (ATROVENT) 0.02 % nebulizer solution 0.5 mg  0.5 mg Nebulization Q2HRS PRN  (RT) Álvaro Ruiz D.O.        rivaroxaban (XARELTO) tablet 15 mg  15 mg Oral BID WITH MEALS Álvaro Ruiz D.O.   15 mg at 10/07/22 0833    Followed by    [START ON 10/26/2022] rivaroxaban (XARELTO) tablet 20 mg  20 mg Oral PM MEAL Álvaro Ruiz D.O.        amiodarone (Cordarone) tablet 400 mg  400 mg Oral TWICE DAILY Álvaro Ruiz D.O.   400 mg at 10/07/22 0531    benzonatate (TESSALON) capsule 100 mg  100 mg Oral TID PRN Benedicto Casiano D.O.   100 mg at 10/06/22 2106    Respiratory Therapy Consult   Nebulization Continuous RT Paula Mohan M.D.        senna-docusate (PERICOLACE or SENOKOT S) 8.6-50 MG per tablet 2 Tablet  2 Tablet Oral BID Paula Mohan M.D.   2 Tablet at 10/07/22 0531    And    polyethylene glycol/lytes (MIRALAX) PACKET 1 Packet  1 Packet Oral QDAY PRN Paula Mohan M.D.        And    magnesium hydroxide (MILK OF MAGNESIA) suspension 30 mL  30 mL Oral QDAY PRN Paula Mohan M.D.        And    bisacodyl (DULCOLAX) suppository 10 mg  10 mg Rectal QDAY PRN Paula Mohan M.D.        acetaminophen (Tylenol) tablet 650 mg  650 mg Oral Q6HRS PRN Paula Mohan M.D.   650 mg at 10/07/22 0531    ondansetron (ZOFRAN) syringe/vial injection 4 mg  4 mg Intravenous Q4HRS PRN Paula Mohan M.D.        ondansetron (ZOFRAN ODT) dispertab 4 mg  4 mg Oral Q4HRS PRN Paula Mohan M.D.        melatonin tablet 5 mg  5 mg Oral Nightly Paula Mohan M.D.   5 mg at 10/06/22 2104    traZODone (DESYREL) tablet 50 mg  50 mg Oral QHS PRN Paula Mohan M.D.   50 mg at 10/06/22 2105       Fluids    Intake/Output Summary (Last 24 hours) at 10/7/2022 0957  Last data filed at 10/6/2022 1200  Gross per 24 hour   Intake --   Output 1000 ml   Net -1000 ml       Laboratory          Recent Labs     10/05/22  0503 10/06/22  0149 10/07/22  0150   SODIUM 132* 132* 135   POTASSIUM 4.0 4.3 4.3   CHLORIDE 97 98 101   CO2 22 22 25   BUN 17 17 23*   CREATININE 0.52 0.52 0.56   MAGNESIUM 2.0 1.9  2.0   PHOSPHORUS 2.5 2.6 2.7   CALCIUM 8.7 8.6 8.5     Recent Labs     10/05/22  0503 10/06/22  0149 10/07/22  0150   GLUCOSE 204* 160* 171*     Recent Labs     10/05/22  0503 10/06/22  0149 10/07/22  0150   WBC 5.7 6.4 7.4   NEUTSPOLYS 83.00* 84.40* 84.60*   LYMPHOCYTES 3.90* 1.90* 2.40*   MONOCYTES 10.80 10.50 10.10   EOSINOPHILS 0.00 0.00 0.00   BASOPHILS 0.70 0.20 0.50     Recent Labs     10/05/22  0503 10/06/22  0149 10/07/22  0150   RBC 3.00* 3.07* 3.07*   HEMOGLOBIN 9.5* 9.7* 9.8*   HEMATOCRIT 28.7* 29.9* 29.7*   PLATELETCT 217 263 279       Imaging  X-Ray:  No film today    Assessment/Plan  * Acute pulmonary embolism with acute cor pulmonale (HCC)- (present on admission)  Assessment & Plan  PE is provoked, patient has a DVT, related to malignancy  Echo showing dilated RV and decreased systolic function    Continue with anticoagulation  Recommend repeating echo prior to discharge  Will give another dose of Lasix today, 20 mg IV    Pulmonary hypertension (HCC)  Assessment & Plan  Multifactorial, patient has a mildly reduced left ventricular systolic function, she also has chronic hypoxemia related to emphysema, at this time she has acute pulmonary emboli, which can also increase her pulmonary pressures and RV function  RVSP is only mildly elevated to 35 by echo    Repeat Lasix 20 mg IV today, repeat chest x-ray  Will continue to monitor closely for any hemodynamic changes, as it is challenging to manage with fluid status of patients with RV dysfunction    Acute exacerbation of chronic obstructive pulmonary disease (COPD) (HCC)  Assessment & Plan  Unclear if patient is currently in exacerbation of COPD, as she appears to be fluid overloaded on imaging and clinically    Continue with scheduled Xopenex and ipratropium  Solu-Medrol started 10/3/2022, will change to prednisone 40 mg daily for 10 days  Patient should be discharged with a combination LABA LAMA ICS, as she is currently only on as needed  medications    Acute hypoxemic respiratory failure (HCC)  Assessment & Plan  Multifactorial in the setting of COPD, possibly an exacerbation, pulmonary embolism, pulmonary infarction, and pulmonary embolism    Continue with oxygen, wean as tolerated  Incentive spirometry and chest physiotherapy  Mobilization as tolerated by patient  Will give another dose of Lasix 20 mg today    Pulmonary infarct (HCC)  Assessment & Plan  Patient has a 7 cm consolidation on the right with areas of cavitation, likely pulmonary infarction due to pulmonary emboli, cavitary pneumonia is also on the differential, however, patient has not been febrile nor had a white count.  She was tachycardic on admission, however, that seems to be related to her pulmonary embolism.  Patient may not mount a robust immune reaction as she is immunocompromise due to chemotherapy    Continue to monitor, patient may need more oxygen due to the consolidation  Monitor for signs of infection, and would recommend starting empiric antibiotics if there is any suspicion  Respiratory culture sent      Tobacco use disorder- (present on admission)  Assessment & Plan  Discussed tobacco cessation with patient, she understands the risks of continuing to smoke    Primary lung squamous cell carcinoma, left (HCC)- (present on admission)  Assessment & Plan  Patient last received her chemotherapy about 2 weeks ago, she reports that it was stopped due to a low white blood cell count  She is continuing with radiation therapy    Incentive spirometry and chest physiotherapy daily to help patient clear her airways  Continue with chemoradiation per team           VTE:  NOAC    I have performed a physical exam and reviewed and updated ROS and Plan today (10/7/2022). In review of yesterday's note (10/6/2022), there are no changes except as documented above.     Damaris Early, DO   Pulmonary and Critical Care

## 2022-10-07 NOTE — PROGRESS NOTES
Monitor Summary  Rhythm: AFLUTTER  Rate: 112-132  Ectopy: PVC,COUP,BBB,TRIP  - / 0.07 / -

## 2022-10-07 NOTE — PROGRESS NOTES
Monitor Summary:    Rhythm: aflutter  HR: 112-142    Measurements: -/.09/-    Ectopy: PVC, trip, 4beats VT, BBB beats                Normal Values  Rhythm SR  HR Range    Measurements 0.12-0.20 / 0.06-0.10  / 0.30-0.52

## 2022-10-07 NOTE — PROGRESS NOTES
Hospital Medicine Daily Progress Note    Date of Service  10/7/2022    Chief Complaint  Shortness of breath    Hospital Course  Geena Richey is a 62 y.o. female with a history of nonsmall cell lung cancer stage IIb diagnosed 8/2022 undergoing radiation and chemotherapy.  She also has additional history of COPD, paroxysmal atrial fibrillation.  Patient was brought in with increasing shortness of breath from her primary care.  Patient was found to have bilateral pulmonary emboli with atrial fibrillation and RVR.  She was initiated on anticoagulation and a diltiazem drip.  She was admitted initially to the ICU.  Patient was initiated on amiodarone drip.  Lower extremity ultrasound was positive for left leg dvt.    Interval Problem Update  10/7 Hgb:9.8. cough nonproductive.  Remains with rapid heart rate.  She is alert.  States getting up walking unit. Constipated     I have discussed this patient's plan of care and discharge plan at IDT rounds today with Case Management, Nursing, Nursing leadership, and other members of the IDT team.    Consultants/Specialty  critical care    Code Status  Full Code    Disposition  Patient is not medically cleared for discharge.   Anticipate discharge to to home with close outpatient follow-up.  I have placed the appropriate orders for post-discharge needs.    Review of Systems  Review of Systems   Constitutional:  Negative for chills and fever.   Eyes:  Negative for discharge.   Respiratory:  Positive for cough. Negative for hemoptysis, shortness of breath and stridor.    Cardiovascular:  Negative for chest pain, palpitations and leg swelling.   Gastrointestinal:  Positive for constipation. Negative for abdominal pain, diarrhea and nausea.   Genitourinary:  Negative for dysuria and hematuria.   Musculoskeletal:  Negative for back pain and joint pain.   Skin:  Negative for rash.   Neurological:  Negative for sensory change, speech change and headaches.   Psychiatric/Behavioral:   The patient is not nervous/anxious.       Physical Exam  Temp:  [36.4 °C (97.5 °F)-36.5 °C (97.7 °F)] 36.5 °C (97.7 °F)  Pulse:  [] 116  Resp:  [16-29] 22  BP: (113-138)/(70-86) 137/81  SpO2:  [90 %-100 %] 93 %    Physical Exam  Vitals reviewed.   Constitutional:       Appearance: Normal appearance. She is not diaphoretic.      Comments: Chemo induced alopecia   HENT:      Head: Normocephalic and atraumatic.      Nose: Nose normal.      Mouth/Throat:      Mouth: Mucous membranes are moist.      Pharynx: No oropharyngeal exudate.   Eyes:      General: No scleral icterus.        Right eye: No discharge.         Left eye: No discharge.      Extraocular Movements: Extraocular movements intact.      Conjunctiva/sclera: Conjunctivae normal.   Neck:      Vascular: No carotid bruit.   Cardiovascular:      Rate and Rhythm: Tachycardia present. Rhythm irregular.      Pulses:           Radial pulses are 2+ on the right side and 2+ on the left side.        Dorsalis pedis pulses are 2+ on the right side and 2+ on the left side.      Heart sounds: No murmur heard.  Pulmonary:      Effort: Pulmonary effort is normal. No respiratory distress.      Breath sounds: Normal breath sounds. No wheezing or rales.   Abdominal:      General: Bowel sounds are normal. There is no distension.      Palpations: Abdomen is soft.   Musculoskeletal:         General: No swelling or tenderness.      Cervical back: No muscular tenderness.      Right lower leg: No edema.      Left lower leg: No edema.   Lymphadenopathy:      Cervical: No cervical adenopathy.   Skin:     Coloration: Skin is not jaundiced or pale.   Neurological:      General: No focal deficit present.      Mental Status: She is alert and oriented to person, place, and time. Mental status is at baseline.      Cranial Nerves: No cranial nerve deficit.   Psychiatric:         Mood and Affect: Mood normal.         Behavior: Behavior normal.       Fluids    Intake/Output Summary (Last 24  hours) at 10/7/2022 1536  Last data filed at 10/7/2022 1100  Gross per 24 hour   Intake 250 ml   Output 400 ml   Net -150 ml       Laboratory  Recent Labs     10/05/22  0503 10/06/22  0149 10/07/22  0150   WBC 5.7 6.4 7.4   RBC 3.00* 3.07* 3.07*   HEMOGLOBIN 9.5* 9.7* 9.8*   HEMATOCRIT 28.7* 29.9* 29.7*   MCV 95.7 97.4 96.7   MCH 31.7 31.6 31.9   MCHC 33.1* 32.4* 33.0*   RDW 53.1* 55.1* 56.0*   PLATELETCT 217 263 279   MPV 12.2 12.0 11.8     Recent Labs     10/05/22  0503 10/06/22  0149 10/07/22  0150   SODIUM 132* 132* 135   POTASSIUM 4.0 4.3 4.3   CHLORIDE 97 98 101   CO2 22 22 25   GLUCOSE 204* 160* 171*   BUN 17 17 23*   CREATININE 0.52 0.52 0.56   CALCIUM 8.7 8.6 8.5                     Imaging  DX-CHEST-PORTABLE (1 VIEW)   Final Result      1.  Stable patchy bilateral basilar areas of consolidation likely representing evolving pulmonary infarcts.   2.   Small right and probable small left pleural effusions.      IR-US GUIDED PIV   Final Result    Ultrasound-guided PERIPHERAL IV INSERTION performed by    qualified nursing staff as above.      US-EXTREMITY VENOUS LOWER BILAT   Final Result      EC-ECHOCARDIOGRAM COMPLETE W/ CONT   Final Result      CT-CTA CHEST PULMONARY ARTERY W/ RECONS   Final Result         Acute bilateral pulmonary emboli, right greater than left, as detailed above with borderline findings of right heart strain, age indeterminate.      Bilateral lower lobe peripheral consolidations with central areas of lucency, most likely related to pulmonary infarcts.      Improved aeration within the left lung with likely improving central obstructing hilar mass.      New small right pleural effusion. Small-to-moderate left pleural effusion is mildly increased.      Mild adenopathy again noted.      Splenic infarcts again noted.      Fleischner Society pulmonary nodule recommendations:   Not applicable      These findings were discussed with LEYDA CASAS on 10/3/2022 6:30 PM.      DX-CHEST-PORTABLE (1  VIEW)   Final Result      1.  New RIGHT lung base consolidation peripherally with probable associated pleural fluid.   2.  Improvement of LEFT lung base consolidation.   3.  LEFT perihilar infiltrate or mass again seen.           Assessment/Plan  * Acute pulmonary embolism with acute cor pulmonale (HCC)- (present on admission)  Assessment & Plan  On Xarelto  Has home oxygen arranged.    Echocardiogram reviewed  Patient in no acute pulmonary distress currently  Initiate oral anticoagulation and discontinue heparin drip  10/4 Echocardiogram  CONCLUSIONS  Mildly reduced left ventricular systolic function. The ejection   fraction is measured to be 40 to 45% by Cunningham's biplane.  Dilated right ventricle with reduced right ventricular systolic   function, abnormal TAPSE 1.4 cm.  Enlarged right atrium.  Dilated inferior vena cava without inspiratory collapse.  Moderate mitral regurgitation.  Mild to moderate tricuspid regurgitation.  Mild pulmonic insufficiency.  No pericardial effusion.  Right and left fibrinous pleural effusions present.    Pleural effusion  Assessment & Plan  Bilateral pulmonary embolisms with infarcts  Has known lung CA  Lasix.  On supplemental oxygen but close to baseline home oxygen needs.    Pulmonary infarct (HCC)  Assessment & Plan  Due to PE  Noted on CTA chest right chest    Pulmonary hypertension (HCC)  Assessment & Plan  Supplemental oxygen    HFrEF (heart failure with reduced ejection fraction) ([facility])  Assessment & Plan  Rate control atrial fibrillation to help.  Given lasix overnight but no overt leg edema   CTA chest does show pleural effusion and a right lung cavitary area (likely from PE)  10/4 Echocardiogram:  CONCLUSIONS  Mildly reduced left ventricular systolic function. The ejection   fraction is measured to be 40 to 45% by Cunningham's biplane.  Dilated right ventricle with reduced right ventricular systolic   function, abnormal TAPSE 1.4 cm.  Enlarged right atrium.  Dilated inferior  vena cava without inspiratory collapse.  Moderate mitral regurgitation.  Mild to moderate tricuspid regurgitation.  Mild pulmonic insufficiency.  No pericardial effusion.  Right and left fibrinous pleural effusions present.    Acute deep vein thrombosis (DVT) of distal vein of left lower extremity (HCC)  Assessment & Plan  Cancer as likely etiology  Anticoagulation. With Xarelto   10/4 US LE:   Acute to subacute, occlusive thrombus in one of left paired posterior    tibial vein proximal calf and both pair at the ankle as well as one paired    of peroneal vein.   No right deep venous thrombosis.    Normocytic normochromic anemia- (present on admission)  Assessment & Plan  10/6 Hgb: 9.7<9.5 < 8.9  Monitor cbc    Tobacco use disorder- (present on admission)  Assessment & Plan  Cessation advised    Acute exacerbation of chronic obstructive pulmonary disease (COPD) (HCC)  Assessment & Plan  Now on prednisone  Will need inhalers for home.  Pulmonary consult appreciated  Stephany krishna  RT per protocol  oxygen via nasal cannula normally on 3 to 3-1/2 L at home    Paroxysmal atrial fibrillation (HCC)- (present on admission)  Assessment & Plan  10/5 discontinue IV amiodarone drip and placed on oral amiodarone 400mg bid  Started coreg 6.25mg BID  Monitor vitals and on telemetry  Initiate oral anticoagulant    Primary lung squamous cell carcinoma, left (HCC)- (present on admission)  Assessment & Plan  On radiation and chemotherapy    Acute hypoxemic respiratory failure (HCC)  Assessment & Plan  COPD exacerbation component along with pulmonary embolism and lung cancer with pleural effusion.  Pulmonary consulting  On baseline home oxygen.       VTE prophylaxis: therapeutic anticoagulation with Xarelto    I have performed a physical exam and reviewed and updated ROS and Plan today (10/7/2022). In review of yesterday's note (10/6/2022), there are no changes except as documented above.

## 2022-10-07 NOTE — ASSESSMENT & PLAN NOTE
Bilateral pulmonary embolisms with infarcts  Has known lung CA  Lasix.  On supplemental oxygen but close to baseline home oxygen needs.

## 2022-10-07 NOTE — HEART FAILURE PROGRAM
Discussed with Dr. Ruiz: at this time, patient's known heart failure is not decompensated. HF f/u appointment and discharge checklist are not indicated.

## 2022-10-07 NOTE — ASSESSMENT & PLAN NOTE
COPD exacerbation component along with pulmonary embolism and lung cancer with pleural effusion.  Pulmonary consulting  On baseline home oxygen.

## 2022-10-07 NOTE — CARE PLAN
The patient is Stable - Low risk of patient condition declining or worsening    Shift Goals  Clinical Goals: monitor HR  Patient Goals: comfortable,rest  Family Goals: KHUSHBOO    Progress made toward(s) clinical / shift goals:      Problem: Pain - Standard  Goal: Alleviation of pain or a reduction in pain to the patient’s comfort goal  Outcome: Progressing   Pt understands need to communicate pain with RN and inform when medication is needed.    Problem: Self Care  Goal: Patient will have the ability to perform ADLs independently or with assistance (bathe, groom, dress, toilet and feed)  Outcome: Progressing   Pt understands need to care for self. Pt calls when needs to use the restroom and will place and use purewick when needed as well.       Patient is not progressing towards the following goals:

## 2022-10-07 NOTE — PROGRESS NOTES
Assumed care of pt. Bedside report received from RN. Pt was updated on plan of care. AOx4. Pt pain level 0/10. Call light, phone and personal belongings in reach. Bed alarm on and working properly, bed in lowest position, and locked.

## 2022-10-07 NOTE — CARE PLAN
The patient is Watcher - Medium risk of patient condition declining or worsening    Shift Goals  Clinical Goals: Monitor HR, radiation  Patient Goals: get some rest  Family Goals: KHUSHBOO    Problem: Knowledge Deficit - COPD  Goal: Patient/significant other demonstrates understanding of disease process, utilization of the Action Plan, medications and discharge instruction  Outcome: Progressing     Problem: Hemodynamics  Goal: Patient's hemodynamics, fluid balance and neurologic status will be stable or improve  Outcome: Progressing

## 2022-10-07 NOTE — PROGRESS NOTES
Reason for Palliative Care Consult: Advance Care Planning (ACP)    Consulted by: Chris Galeas M.D.    HPI: Racquel Richey is a 62 y.o. female with past medical history of COPD, depression, chronic back pain, endometrial cancer (s/p surgical resection Dr. Simpson 2014), stage II non-small cell lung cancer (diagnosed 8/2022), currently undergoing chemotherapy, radiation treatment, who presented 10/3/2022 with shortness of breath, palpitations. ED work-up showed patient to have bilateral PE with atrial fibrillation in RVR.  She was started on heparin drip as well as diltiazem drip and admitted to the ICU.    Palliative Care consulting for advance directive completion.    Past medical/surgical history:   Past Medical History:   Diagnosis Date    Arthritis     hips    Cancer (HCC) 2014    uterine    Cholesterol blood decreased     Other specified symptom associated with female genital organs     post menopausal bleeding     Additional consults:   Critical Care  Hospital Medicine  Nutrition (Dietary)    Assessment:  Neuro: Patient alert & oriented X4  Dyspnea: Yes-    Last BM: 10/04/22-    Pain: No-    Depression: Mood appropriate for situation-    Dementia: No;       Living situation & psychosocial: Patient is , not employed, and lives with her boyfriend Ramírez Landis in [place], [place].  She has a sister, Aydee Richey, who lives nearby and provides physical and emotional support.  She has 2 adult children.  She works as a caregiver.     Spiritual:  Is Hinduism or spirituality important for coping with this illness? No-    Has a  or spiritual provider visit been requested? No    Palliative Performance Scale: 60%    Advance Directive: Advance Directive (Pending notary signature)-    DPOA: No-    POLST: No-      To locate and/or review the AD/POLST, please hover the cursor over the patient's code status, then scroll down under Documents to access ACP document links.    Code Status: Full-   "    Discussion:  Met with patient at bedside, immediately following her respiratory treatment. Introduced myself and explained my role in Palliative Care (PC).    Discussed Advance Directive (AD) and educated patient about purpose of form.  Patient selected sister Aydee Richey ([phone]) as DPOA-HC; with boyfriend Ramírez Landis ([phone]) as First Alternate.  No Second Alternate selected.  Statement of desires reviewed and completed.  Patient selected #2, #3, #5, and #5 meaning that she would not want life prolonging treatments to be provided if there was no reasonable hope of recovery, long-term survival or if the patient was in a coma. She would want her POA-HC to consider quality, relief of suffering, and preservation of function.  Completed AD, signed certificate of competency, and copy of patient ID placed in \"to be notarized\" basket in PC office.  Once AD is notarized, it will be scanned into EMR, with original document returned to patient at bedside.    Provided Racquel with Palliative Care contact information, and encouraged her to call with any questions/needs, or if she just needs to talk.     Provided therapeutic communication including open-ended questions, therapeutic silence, reflective listening, normalization of feelings, and empathic support throughout encounter.      Outcome:  AD completed; will be scanned into EMR once AD is signed by delfina.    Plan: Palliative care to continue to follow, provide support, and help facilitate decision making as clinical picture evolves.    Updated: Odalis Henry, PC RN    Thank you for allowing Palliative Care to participate in Racquel's care. Please call our team with questions and/or additional needs.    As appropriate, Palliative Care encourages medical team to engage in further conversation, education for patient/family at bedside regarding code status, GOC/POC.    Total visit time was 17 minutes discussing and coordinating advance care planning.     Elida " Leda Loredo DNP, APRN, [facility]  Palliative Care Nurse Practitioner  [phone]

## 2022-10-07 NOTE — CONSULTS
Reason for PC Consult: Advance Care Planning    Consulted by: Dr. Galeas, currently Dr. Ruiz    Assessment:  General: 61 y/o female from home with worsening SOB and found to have bilateral PE and right sided 7cm cavitary lesion concerning for possible infarct. Also positive for DVT on further imaging. She was found to be in afib, started on heparin and diltiazem drips, and admitted to ICU. Pt with newly diagnosed stage IIb squamous cell carcinoma currently undergoing chemo and XRT with Dr. Espinoza and Reece, respectively. She has 2 remaining chemotherapy sessions but states in was held d/t her blood counts. She believes XRT is complete 10/10 and endorses no issues with chemo or XRT. She She is on O2 at 3.5LPM at baseline. Other hx of COPD, current smoker, HLD and arthritis    Prior PC Consults: n/a    Social: Pt resides in [place] with her SO Ramírez, who helps with appointments, transportation, etc. Her sister Aydee is also in [place] and assists as needed. Racquel is planning to DC to an assisted living (the [facility]) that her sister manages in [place]. She tells PC that she is SOB at rest, but this is better than before. She reports ambulation is difficult d/t SOB    Consults: critical care    Dyspnea: Yes-    Last BM: 10/04/22-    Pain: No-    Depression: Mood appropriate for situation-    Dementia: No;       Spiritual:  Is Gnosticist or spirituality important for coping with this illness? No-    Has a  or spiritual provider visit been requested? No    Palliative Performance Scale: 60%    Advance Directive: None-  discussed and she wishes to complete; place made to do this today   DPOA: No-  she would like to appoint her sister as her POA  POLST: No-      Code Status: Full-  discussed in detail and she reports wanting to remain full code at this time. PC encouraged her to continue thinking about this, especially as she learns more about her overall health picture. PC expressed concern with her  "being able to liberate from the ventilator if she were to be intubated and this being something that would require more discussion with her MDs    Outcome:  PC RN met with Racquel at bedside and explained the role of PC to help with discussions about the current medical situation and goals moving forward.. She confirmed her social/family/medical history as explained by this RN per the EMR. She states that treatment is going \"well\" and denies any issues with continuing. She states that it was held \"because of my white cells or something\" but she reports tolerating everything well. She refers to her sister as her caregiver and endorsed the plan to go to the [facility] so Aydee can help her better. PC discussed the current situation and concerns with her heart failure, blood clots, and limited functional status d/t her breathing. She denied having spoken with her sister about her wishes for the future, nor having thought about her wishes regarding code status, etc. PC provided education on code status and the measures employed during a code situation and encouraged her to continue thinking about this as she learns about her disease process. She expressed understanding.    AD discussed and she is interested in completing. PC made a plan to have PC teammate complete with her in order to notarize and she was agreeable. Racquel denied having questions about her current situation and POC. She expressed hope that she would be able to return to treatment, but aware of the need to f/u with Dr. Espinoza outpatient. While talking to Racquel, PC RN provided therapeutic communication, including open ended questions, reflective listening, and normalization of thought and feelings throughout encounter, as well as reinforced her goals of care. PC contact information provided to Racquel and encouraged to call with any questions or concerns.     Updated: MD/RN    Plan: PC to complete AD with Racquel prior to DC; going to [facility] in [place] when medically " cleared    Thank you for allowing Palliative Care to support this patient and family. Contact [phone] for additional assistance, change in patient status, or with any questions/concerns.

## 2022-10-07 NOTE — DISCHARGE PLANNING
Attempted to meet with pt at bedside to verify DC disposition ([facility] vs [facility]). Pt on the phone and unable to speak with this LSW at this time. Report left to request weekend  f/u with pt.

## 2022-10-08 NOTE — CARE PLAN
Problem: Pain - Standard  Goal: Alleviation of pain or a reduction in pain to the patient’s comfort goal  Outcome: Progressing     Problem: Knowledge Deficit - Standard  Goal: Patient and family/care givers will demonstrate understanding of plan of care, disease process/condition, diagnostic tests and medications  Outcome: Progressing     Problem: Fall Risk  Goal: Patient will remain free from falls  Outcome: Progressing     Problem: Hemodynamics  Goal: Patient's hemodynamics, fluid balance and neurologic status will be stable or improve  Outcome: Progressing   The patient is Stable - Low risk of patient condition declining or worsening    Shift Goals  Clinical Goals: HR control  Patient Goals: get out of bed.  Family Goals: KHUSHBOO    Progress made toward(s) clinical / shift goals:  Progressing    Patient is not progressing towards the following goals:

## 2022-10-08 NOTE — PROGRESS NOTES
Monitor Summary:   Rhythm: aflutter  Rate: 117-130  Measurement: .na/.10/.na  Ectopy: pvc's, couplets, bigem

## 2022-10-08 NOTE — PROGRESS NOTES
Messaged provider, HR increased from 125-140s, sustained. Gave 5mg of metoprolol earlier in shift, MD notified.

## 2022-10-08 NOTE — CONSULTS
"Reason for Consult:  Asked by Dr Álvaro Ruiz D.O. to see this patient with AF    CC:   Chief Complaint   Patient presents with    Sent by MD     Sent by Dr Grewal to get her \"lungs checked out\" increased in shortness of breath.  Right sided chest pain, cough d/t radiation.  Treatment for Lung CA.        HPI:      62 year old woman with PMH nonsmall cell lung cancer [facility]lives in [place]), COPD, paroxysmal AF, tobacco use presents with shortness of breath above baseline found DVT and submassive high risk PE on CT with RVF on echocardiogram. Patient states breathing maybe minimally improved. No other cardiac complaints. She does get light headed on standing.      Medications / Drug list prior to admission:  No current facility-administered medications on file prior to encounter.     Current Outpatient Medications on File Prior to Encounter   Medication Sig Dispense Refill    albuterol 108 (90 Base) MCG/ACT Aero Soln inhalation aerosol Inhale 2 Puffs every four hours as needed for Shortness of Breath.      budesonide-formoterol (SYMBICORT) 80-4.5 MCG/ACT Aerosol Inhale 2 Puffs by mouth 2 times a day. 10.2 g 0       Current list of administered Medications:    Current Facility-Administered Medications:     carvedilol (COREG) tablet 12.5 mg, 12.5 mg, Oral, BID WITH MEALS, Álvaro Ruiz D.O.    predniSONE (DELTASONE) tablet 40 mg, 40 mg, Oral, DAILY, Damaris Early D.O., 40 mg at 10/08/22 0657    Metoprolol Tartrate (LOPRESSOR) injection 5 mg, 5 mg, Intravenous, Q5 MIN PRN, Linsey M Wegener, A.P.R.N.    levalbuterol (XOPENEX) 1.25 MG/3ML nebulizer solution 1.25 mg, 1.25 mg, Nebulization, 4X/DAY (RT), Álvaro Ruiz D.O., 1.25 mg at 10/08/22 0701    ipratropium (ATROVENT) 0.02 % nebulizer solution 0.5 mg, 0.5 mg, Nebulization, 4X/DAY (RT), Álvaro Ruiz D.O., 0.5 mg at 10/08/22 0701    levalbuterol (XOPENEX) 1.25 MG/3ML nebulizer solution 1.25 mg, 1.25 mg, " Nebulization, Q2HRS PRN (RT), Álvaro Ruiz D.O.    ipratropium (ATROVENT) 0.02 % nebulizer solution 0.5 mg, 0.5 mg, Nebulization, Q2HRS PRN (RT), Álvaro Ruiz D.O.    rivaroxaban (XARELTO) tablet 15 mg, 15 mg, Oral, BID WITH MEALS, 15 mg at 10/08/22 0657 **FOLLOWED BY** [START ON 10/26/2022] rivaroxaban (XARELTO) tablet 20 mg, 20 mg, Oral, PM MEAL, Álvaro Ruiz D.O.    amiodarone (Cordarone) tablet 400 mg, 400 mg, Oral, TWICE DAILY, Álvaro Ruiz D.O., 400 mg at 10/08/22 0657    benzonatate (TESSALON) capsule 100 mg, 100 mg, Oral, TID PRN, Benedicto Casiano D.O., 100 mg at 10/06/22 2106    Respiratory Therapy Consult, , Nebulization, Continuous RT, Paula Mohan M.D.    senna-docusate (PERICOLACE or SENOKOT S) 8.6-50 MG per tablet 2 Tablet, 2 Tablet, Oral, BID, 2 Tablet at 10/07/22 1731 **AND** polyethylene glycol/lytes (MIRALAX) PACKET 1 Packet, 1 Packet, Oral, QDAY PRN **AND** magnesium hydroxide (MILK OF MAGNESIA) suspension 30 mL, 30 mL, Oral, QDAY PRN **AND** bisacodyl (DULCOLAX) suppository 10 mg, 10 mg, Rectal, QDAY PRN, Paula Mohan M.D.    acetaminophen (Tylenol) tablet 650 mg, 650 mg, Oral, Q6HRS PRN, Paula Mohan M.D., 650 mg at 10/07/22 0531    ondansetron (ZOFRAN) syringe/vial injection 4 mg, 4 mg, Intravenous, Q4HRS PRN, Paula Mohan M.D.    ondansetron (ZOFRAN ODT) dispertab 4 mg, 4 mg, Oral, Q4HRS PRN, Paula Mohan M.D.    melatonin tablet 5 mg, 5 mg, Oral, Nightly, Paula Mohan M.D., 5 mg at 10/07/22 2100    traZODone (DESYREL) tablet 50 mg, 50 mg, Oral, QHS PRN, Paula Mohan M.D., 50 mg at 10/07/22 2053    Past Medical History:   Diagnosis Date    Arthritis     hips    Cancer (HCC) 2014    uterine    Cholesterol blood decreased     Other specified symptom associated with female genital organs     post menopausal bleeding       Past Surgical History:   Procedure Laterality Date    CO BRONCHOSCOPY,DIAGNOSTIC N/A 8/10/2022    Procedure: BRONCHOSCOPY WITH  ENDOBRONCHIAL ULTRASOUND;  Surgeon: Bonnie Boucher M.D.;  Location: SURGERY [facility];  Service: Pulmonary    ENDOBRONCHIAL US ADD-ON N/A 8/10/2022    Procedure: ENDOBRONCHIAL ULTRASOUND (EBUS);  Surgeon: Bonnie Boucher M.D.;  Location: SURGERY [facility];  Service: Pulmonary    HYSTERECTOMY ROBOTIC XI  2014    Performed by Anjum Simpson M.D. at SURGERY [facility] ORS    LYMPH NODE DISSECTION ROBOTIC XI  2014    Performed by Anjum Simpson M.D. at SURGERY [facility] ORS    OMENTECTOMY  2014    Performed by Anjum Simpson M.D. at SURGERY [facility] ORS    DILATION AND CURETTAGE  2014    Performed by Sarath Waller M.D. at SURGERY SAME DAY [facility]    GYN SURGERY      tubal ligation       History reviewed. No pertinent family history.  Patient family history was personally reviewed, no pertinent family history to current presentation    Social History     Socioeconomic History    Marital status: Single     Spouse name: Not on file    Number of children: Not on file    Years of education: Not on file    Highest education level: Not on file   Occupational History    Not on file   Tobacco Use    Smoking status: Former     Packs/day: 0.50     Years: 35.00     Pack years: 17.50     Types: Cigarettes     Quit date: 2022     Years since quittin.2    Smokeless tobacco: Never   Vaping Use    Vaping Use: Never used   Substance and Sexual Activity    Alcohol use: Yes     Comment: 2-3 per week    Drug use: No    Sexual activity: Not on file   Other Topics Concern    Not on file   Social History Narrative    Not on file     Social Determinants of Health     Financial Resource Strain: Not on file   Food Insecurity: Not on file   Transportation Needs: Not on file   Physical Activity: Not on file   Stress: Not on file   Social Connections: Not on file   Intimate Partner Violence: Not on file   Housing Stability: Not on file       ALLERGIES:  No Known Allergies    Review of systems:  A complete  review of symptoms was reviewed with patient. This is reviewed in H&P and PMH. ALL OTHERS reviewed and negative    Physical exam:  Patient Vitals for the past 24 hrs:   BP Temp Temp src Pulse Resp SpO2 Weight   10/08/22 0724 104/77 36.7 °C (98.1 °F) Temporal (!) 106 18 94 % --   10/08/22 0702 -- -- -- (!) 116 18 93 % --   10/08/22 0500 -- -- -- (!) 116 -- -- --   10/08/22 0352 132/89 36.6 °C (97.9 °F) Temporal -- 18 90 % --   10/07/22 2340 125/85 36.8 °C (98.2 °F) Temporal (!) 112 19 93 % --   10/07/22 1943 -- -- -- 63 19 97 % --   10/07/22 1906 135/85 36.4 °C (97.5 °F) Temporal (!) 145 20 94 % 96 kg (211 lb 10.3 oz)   10/07/22 1543 (!) 119/93 36.4 °C (97.5 °F) Temporal (!) 119 20 92 % --   10/07/22 1419 -- -- -- (!) 116 (!) 22 93 % --   10/07/22 1152 137/81 36.5 °C (97.7 °F) Temporal (!) 110 (!) 25 100 % --   10/07/22 1142 -- 36.4 °C (97.5 °F) Temporal -- -- -- --     General: No acute distress.   EYES: no jaundice  HEENT: OP clear   Neck: No bruits No JVD.   CVS:  irreg. S1 + S2. No M/R/G. Trace edema.  Resp: CTAB. No wheezing or crackles/rhonchi.  Abdomen: Soft, NT, ND,  Skin: Grossly nothing acute no obvious rashes  Neurological: Alert, Moves all extremities, no cranial nerve defects on limited exam  Extremities: Pulse 2+ in b/l LE. No cyanosis.     Data:  Laboratory studies personally reviewed by me:  Recent Results (from the past 24 hour(s))   CBC with Differential    Collection Time: 10/08/22  2:32 AM   Result Value Ref Range    WBC 6.9 4.8 - 10.8 K/uL    RBC 3.11 (L) 4.20 - 5.40 M/uL    Hemoglobin 9.9 (L) 12.0 - 16.0 g/dL    Hematocrit 30.4 (L) 37.0 - 47.0 %    MCV 97.7 81.4 - 97.8 fL    MCH 31.8 27.0 - 33.0 pg    MCHC 32.6 (L) 33.6 - 35.0 g/dL    RDW 60.3 (H) 35.9 - 50.0 fL    Platelet Count 280 164 - 446 K/uL    MPV 11.7 9.0 - 12.9 fL    Neutrophils-Polys 79.30 (H) 44.00 - 72.00 %    Lymphocytes 1.70 (L) 22.00 - 41.00 %    Monocytes 14.90 (H) 0.00 - 13.40 %    Eosinophils 0.10 0.00 - 6.90 %    Basophils  0.70 0.00 - 1.80 %    Immature Granulocytes 3.30 (H) 0.00 - 0.90 %    Nucleated RBC 2.20 /100 WBC    Neutrophils (Absolute) 5.45 2.00 - 7.15 K/uL    Lymphs (Absolute) 0.12 (L) 1.00 - 4.80 K/uL    Monos (Absolute) 1.03 (H) 0.00 - 0.85 K/uL    Eos (Absolute) 0.01 0.00 - 0.51 K/uL    Baso (Absolute) 0.05 0.00 - 0.12 K/uL    Immature Granulocytes (abs) 0.23 (H) 0.00 - 0.11 K/uL    NRBC (Absolute) 0.15 K/uL   Basic Metabolic Panel (BMP)    Collection Time: 10/08/22  2:32 AM   Result Value Ref Range    Sodium 138 135 - 145 mmol/L    Potassium 4.7 3.6 - 5.5 mmol/L    Chloride 101 96 - 112 mmol/L    Co2 25 20 - 33 mmol/L    Glucose 134 (H) 65 - 99 mg/dL    Bun 23 (H) 8 - 22 mg/dL    Creatinine 0.53 0.50 - 1.40 mg/dL    Calcium 8.3 (L) 8.5 - 10.5 mg/dL    Anion Gap 12.0 7.0 - 16.0   MAGNESIUM    Collection Time: 10/08/22  2:32 AM   Result Value Ref Range    Magnesium 1.8 1.5 - 2.5 mg/dL   PHOSPHORUS    Collection Time: 10/08/22  2:32 AM   Result Value Ref Range    Phosphorus 2.9 2.5 - 4.5 mg/dL   ESTIMATED GFR    Collection Time: 10/08/22  2:32 AM   Result Value Ref Range    GFR (CKD-EPI) 105 >60 mL/min/1.73 m 2       EKG 10/3/22 interpreted by me AF RVR w left axis    All pertinent features of laboratory and imaging reviewed including primary images where applicable    TTE 10/4/22  CONCLUSIONS  Mildly reduced left ventricular systolic function. The ejection   fraction is measured to be 40 to 45% by Cunningham's biplane.  Dilated right ventricle with reduced right ventricular systolic   function, abnormal TAPSE 1.4 cm.  Enlarged right atrium.  Dilated inferior vena cava without inspiratory collapse.  Moderate mitral regurgitation.  Mild to moderate tricuspid regurgitation.  Mild pulmonic insufficiency.  No pericardial effusion.  Right and left fibrinous pleural effusions present.     Compared to the prior study on  08/11/2022, now with biventricular   reduced systolic function.     Principal Problem:    Acute pulmonary embolism  with acute cor pulmonale (HCC) POA: Yes  Active Problems:    Acute hypoxemic respiratory failure (HCC) POA: Unknown    Primary lung squamous cell carcinoma, left (HCC) POA: Yes    Paroxysmal atrial fibrillation (HCC) POA: Yes    Acute exacerbation of chronic obstructive pulmonary disease (COPD) (HCC) POA: Unknown    Tobacco use disorder POA: Yes    Normocytic normochromic anemia POA: Yes    Lactic acidosis POA: Yes    Sepsis (HCC) POA: Yes    Acute deep vein thrombosis (DVT) of distal vein of left lower extremity (HCC) POA: Unknown    HFrEF (heart failure with reduced ejection fraction) (HCC) POA: Unknown    Pulmonary hypertension (HCC) POA: Unknown    Pulmonary infarct (HCC) POA: Unknown    Pleural effusion POA: Unknown  Resolved Problems:    * No resolved hospital problems. *      Assessment / Plan:  62 year old woman with PMH nonsmall cell lung cancer staying at [facility] for chemo and radiation (lives in [place]), COPD, paroxysmal AF, tobacco use presents with shortness of breath above baseline found DVT and submassive high risk PE on CT with cardiac biventricular failure on echocardiogram.     -please consider catheter directed lysis for PE especially since remains hypoxic and tachy on amiodarone and carvedilol with findings of RV failure and elevated cardiac markers. As well appears lactic acid elevated.  -long term anticoagulation  -d/c amiodarone due to lung toxicity  -change carvedilol to metoprolol succinate and titrate as tolerated. If need additional rate control add digoxin.   -add ace/arb when able and titrate as tolerated. Thereafter MRA such as spironolactone. If still BP room then SGLT2i such as farxiga.  -outpatient can consider LEANA/DCCV for AF  -please consider high intensity statin for moderate aortic atherosclerosis with significant soft plaque    I personally discussed her case with Dr Ruiz    It is my pleasure to participate in the care of Ms. Richey.  Please do not hesitate to contact  me with questions or concerns.    Elijah Ceja MD  Cardiologist [facility]    A total of 62 minutes of time was spent on day of encounter reviewing medical record, performing history and examination, counseling, ordering medication/test/consults and documentation.

## 2022-10-08 NOTE — PROGRESS NOTES
Hospital Medicine Daily Progress Note    Date of Service  10/8/2022    Chief Complaint  Shortness of breath    Hospital Course  Geena Richey is a 62 y.o. female with a history of nonsmall cell lung cancer stage IIb diagnosed 8/2022 undergoing radiation and chemotherapy.  She also has additional history of COPD, paroxysmal atrial fibrillation.  Patient was brought in with increasing shortness of breath from her primary care.  Patient was found to have bilateral pulmonary emboli with atrial fibrillation and RVR.  She was initiated on anticoagulation and a diltiazem drip.  She was admitted initially to the ICU.  Patient was initiated on amiodarone drip.  Lower extremity ultrasound was positive for left leg dvt.  Patient normally follows up with Dr. Santos Espinoza, oncology and Dr. Nicole Newell, radiation oncologist.    Interval Problem Update  10/8: Patient alert and oriented.  Some mild shortness of breath.  States she has been getting up and walking with a walker.  Denies any chest pain.  Due to ongoing tachycardia/atrial fibrillation with rapid rate I have consulted Dr. Ceja, cardiologist.  He was concerned that the patient may have a submassive PE.  Discussion was brought up of whether or not she should have EKOS.  She is already been transpositioned off of heparin drip and is on Xarelto and has been in the hospital 5 days.  I have discussed with Dr. Early the possibility of patient having EKOS.  Thought at this point time is to repeat an echocardiogram to see if there is any ongoing significant right heart strain.  Cardiology wants to discontinue patient's amiodarone and switch Coreg to metoprolol.    I have discussed this patient's plan of care and discharge plan at IDT rounds today with Case Management, Nursing, Nursing leadership, and other members of the IDT team.    Consultants/Specialty  critical care    Code Status  Full Code    Disposition  Patient is not medically cleared for  discharge.   Anticipate discharge to to home with close outpatient follow-up.  I have placed the appropriate orders for post-discharge needs.    Review of Systems  Review of Systems   Constitutional:  Negative for fever and malaise/fatigue.   Eyes:  Negative for discharge.   Respiratory:  Positive for shortness of breath. Negative for cough, hemoptysis and stridor.    Cardiovascular:  Negative for chest pain, palpitations and leg swelling.   Gastrointestinal:  Negative for abdominal pain, diarrhea and nausea.   Genitourinary:  Negative for dysuria and hematuria.   Musculoskeletal:  Negative for back pain and joint pain.   Skin:  Negative for rash.   Neurological:  Negative for speech change and headaches.   Psychiatric/Behavioral:  The patient is not nervous/anxious.       Physical Exam  Temp:  [36.4 °C (97.5 °F)-36.8 °C (98.2 °F)] 36.5 °C (97.7 °F)  Pulse:  [] 108  Resp:  [18-22] 18  BP: (104-135)/(77-93) 112/77  SpO2:  [90 %-97 %] 94 %    Physical Exam  Vitals reviewed.   Constitutional:       Appearance: Normal appearance. She is not diaphoretic.      Comments: Chemo induced alopecia   HENT:      Head: Normocephalic and atraumatic.      Nose: Nose normal.      Mouth/Throat:      Mouth: Mucous membranes are moist.      Pharynx: No oropharyngeal exudate.   Eyes:      General: No scleral icterus.        Right eye: No discharge.         Left eye: No discharge.      Extraocular Movements: Extraocular movements intact.      Conjunctiva/sclera: Conjunctivae normal.   Cardiovascular:      Rate and Rhythm: Tachycardia present. Rhythm irregular.      Pulses:           Radial pulses are 2+ on the right side and 2+ on the left side.        Dorsalis pedis pulses are 2+ on the right side and 2+ on the left side.      Heart sounds: No murmur heard.  Pulmonary:      Effort: Pulmonary effort is normal. No respiratory distress.      Breath sounds: Normal breath sounds. No wheezing or rales.   Abdominal:      General: Bowel  sounds are normal. There is no distension.      Palpations: Abdomen is soft.      Tenderness: There is no abdominal tenderness.   Musculoskeletal:         General: No swelling or tenderness.      Cervical back: No tenderness. No muscular tenderness.      Right lower leg: No edema.      Left lower leg: No edema.   Lymphadenopathy:      Cervical: No cervical adenopathy.   Skin:     Coloration: Skin is not jaundiced or pale.   Neurological:      General: No focal deficit present.      Mental Status: She is alert and oriented to person, place, and time. Mental status is at baseline.      Cranial Nerves: No cranial nerve deficit.   Psychiatric:         Mood and Affect: Mood normal.         Behavior: Behavior normal.       Fluids    Intake/Output Summary (Last 24 hours) at 10/8/2022 1153  Last data filed at 10/8/2022 0824  Gross per 24 hour   Intake 180 ml   Output --   Net 180 ml       Laboratory  Recent Labs     10/06/22  0149 10/07/22  0150 10/08/22  0232   WBC 6.4 7.4 6.9   RBC 3.07* 3.07* 3.11*   HEMOGLOBIN 9.7* 9.8* 9.9*   HEMATOCRIT 29.9* 29.7* 30.4*   MCV 97.4 96.7 97.7   MCH 31.6 31.9 31.8   MCHC 32.4* 33.0* 32.6*   RDW 55.1* 56.0* 60.3*   PLATELETCT 263 279 280   MPV 12.0 11.8 11.7     Recent Labs     10/06/22  0149 10/07/22  0150 10/08/22  0232   SODIUM 132* 135 138   POTASSIUM 4.3 4.3 4.7   CHLORIDE 98 101 101   CO2 22 25 25   GLUCOSE 160* 171* 134*   BUN 17 23* 23*   CREATININE 0.52 0.56 0.53   CALCIUM 8.6 8.5 8.3*                     Imaging  DX-CHEST-PORTABLE (1 VIEW)   Final Result      1.  Stable patchy bilateral basilar areas of consolidation likely representing evolving pulmonary infarcts.   2.   Small right and probable small left pleural effusions.      IR-US GUIDED PIV   Final Result    Ultrasound-guided PERIPHERAL IV INSERTION performed by    qualified nursing staff as above.      US-EXTREMITY VENOUS LOWER BILAT   Final Result      EC-ECHOCARDIOGRAM COMPLETE W/ CONT   Final Result      CT-CTA CHEST  PULMONARY ARTERY W/ RECONS   Final Result         Acute bilateral pulmonary emboli, right greater than left, as detailed above with borderline findings of right heart strain, age indeterminate.      Bilateral lower lobe peripheral consolidations with central areas of lucency, most likely related to pulmonary infarcts.      Improved aeration within the left lung with likely improving central obstructing hilar mass.      New small right pleural effusion. Small-to-moderate left pleural effusion is mildly increased.      Mild adenopathy again noted.      Splenic infarcts again noted.      Fleischner Society pulmonary nodule recommendations:   Not applicable      These findings were discussed with LEYDA CASAS on 10/3/2022 6:30 PM.      DX-CHEST-PORTABLE (1 VIEW)   Final Result      1.  New RIGHT lung base consolidation peripherally with probable associated pleural fluid.   2.  Improvement of LEFT lung base consolidation.   3.  LEFT perihilar infiltrate or mass again seen.      EC-ECHOCARDIOGRAM LTD W/O CONT    (Results Pending)        Assessment/Plan  * Acute pulmonary embolism with acute cor pulmonale (HCC)- (present on admission)  Assessment & Plan  On Xarelto  Has home oxygen arranged.  10/8 repeat echocardiogram limited study with evaluation of right heart strain.  I have consulted cardiology for ongoing rapid heart rate with atrial flutter he was concern of potential submassive PE.  I have discussed with pulmonary we will repeat echocardiogram as per above.  Echocardiogram reviewed  Patient in no acute pulmonary distress currently  Initiate oral anticoagulation and discontinue heparin drip  10/4 Echocardiogram  CONCLUSIONS  Mildly reduced left ventricular systolic function. The ejection   fraction is measured to be 40 to 45% by Cunningham's biplane.  Dilated right ventricle with reduced right ventricular systolic   function, abnormal TAPSE 1.4 cm.  Enlarged right atrium.  Dilated inferior vena cava without inspiratory  collapse.  Moderate mitral regurgitation.  Mild to moderate tricuspid regurgitation.  Mild pulmonic insufficiency.  No pericardial effusion.  Right and left fibrinous pleural effusions present.    Pleural effusion  Assessment & Plan  Bilateral pulmonary embolisms with infarcts  Has known lung CA  Lasix.  On supplemental oxygen but close to baseline home oxygen needs.    Pulmonary infarct (HCC)  Assessment & Plan  Due to PE  Noted on CTA chest right chest    Pulmonary hypertension ([facility])  Assessment & Plan  Supplemental oxygen    HFrEF (heart failure with reduced ejection fraction) ([facility])  Assessment & Plan  Rate control atrial fibrillation to help.  Given lasix overnight but no overt leg edema   CTA chest does show pleural effusion and a right lung cavitary area (likely from PE)  10/4 Echocardiogram:  CONCLUSIONS  Mildly reduced left ventricular systolic function. The ejection   fraction is measured to be 40 to 45% by Cunningham's biplane.  Dilated right ventricle with reduced right ventricular systolic   function, abnormal TAPSE 1.4 cm.  Enlarged right atrium.  Dilated inferior vena cava without inspiratory collapse.  Moderate mitral regurgitation.  Mild to moderate tricuspid regurgitation.  Mild pulmonic insufficiency.  No pericardial effusion.  Right and left fibrinous pleural effusions present.    Acute deep vein thrombosis (DVT) of distal vein of left lower extremity ([facility])  Assessment & Plan  Cancer as likely etiology  Anticoagulation. With Xarelto   10/4 US LE:   Acute to subacute, occlusive thrombus in one of left paired posterior    tibial vein proximal calf and both pair at the ankle as well as one paired    of peroneal vein.   No right deep venous thrombosis.    Normocytic normochromic anemia- (present on admission)  Assessment & Plan  10/8 Hgb: 9.9 <9.8 <9.7<9.5 < 8.9  Monitor cbc    Tobacco use disorder- (present on admission)  Assessment & Plan  Cessation advised    Acute exacerbation of chronic obstructive  pulmonary disease (COPD) (HCC)  Assessment & Plan  Now on prednisone  Will need inhalers for home.  Pulmonary consult appreciated  Duo nebs  RT per protocol  oxygen via nasal cannula normally on 3 to 3-1/2 L at home    Paroxysmal atrial fibrillation (HCC)- (present on admission)  Assessment & Plan  10/5 discontinue IV amiodarone drip and placed on oral amiodarone 400mg bid  10/8 consulted and discussed with cardiologist Dr. Yen.  He wanted amiodarone discontinued as well as Coreg.  Start metoprolol.  Discussion over potential EKOS and after further discussion with pulmonary we will repeat a new limited echocardiogram.  Monitor vitals and on telemetry  Initiate oral anticoagulant    Primary lung squamous cell carcinoma, left (HCC)- (present on admission)  Assessment & Plan  On radiation and chemotherapy    Acute hypoxemic respiratory failure (HCC)  Assessment & Plan  COPD exacerbation component along with pulmonary embolism and lung cancer with pleural effusion.  Pulmonary consulting  On baseline home oxygen.       VTE prophylaxis: therapeutic anticoagulation with Xarelto    I have performed a physical exam and reviewed and updated ROS and Plan today (10/8/2022). In review of yesterday's note (10/7/2022), there are no changes except as documented above.

## 2022-10-09 NOTE — RESPIRATORY CARE
REMOTE MONITORING PROGRAM by RESPIRATORY THERAPY  10/9/2022 at 11:34 AM by Brenda Nma             Patient's name:  Racquel Richey        MRN: [id number]       : 1960  Physical address: [address] [place] [address] [place] [address]  Cell phone # [phone]  Current oxygen requirement 3LPM  Ref # [id number] & Lot # [id number]  Emergency contact name & number: Ramírez Landis [phone]  Primary language English            Patient agrees to Remote Monitoring program. Device instruction performed with welcome packet, consent signed and placed at bedside chart. Patient instructed on how to use MyChart and Zoom. No further questions at this time.

## 2022-10-09 NOTE — CARE PLAN
The patient is Stable - Low risk of patient condition declining or worsening    Shift Goals  Clinical Goals: HR control  Patient Goals: Sleep  Family Goals: [id number]    Progress made toward(s) clinical / shift goals:    Problem: Pain - Standard  Goal: Alleviation of pain or a reduction in pain to the patient’s comfort goal  Outcome: Progressing  Note: 0-10 pain scale, use of pharmacological and nonpharmacologic interventions      Problem: Knowledge Deficit - Standard  Goal: Patient and family/care givers will demonstrate understanding of plan of care, disease process/condition, diagnostic tests and medications  Outcome: Progressing  Note: Updated on the POC and medications. All questions and concerns addressed      Problem: Respiratory  Goal: Patient will achieve/maintain optimum respiratory ventilation and gas exchange  Outcome: Progressing  Patient on 3L NC, saturating in the 90s       Patient is not progressing towards the following goals:

## 2022-10-09 NOTE — FLOWSHEET NOTE
Respiratory Therapy Update    Interdisciplinary Plan of Care-Goals (Indications)  Bronchodilator Indications: History / Diagnosis (10/06/22 0436)       $ PEP/CPT Performed: PEP / Flutter (10/09/22 1558)     $ SVN Performed: Yes (10/08/22 2009)    Cough: Non Productive;Moist (10/09/22 1558)  Sputum Amount: Unable to Evaluate (10/09/22 1558)  Sputum Color: Unable to Evaluate (10/09/22 1558)  Sputum Consistency: Unable to Evaluate (10/09/22 1558)                  O2 (LPM): 3 (10/09/22 1558)       Breath Sounds  RUL Breath Sounds: Clear (10/09/22 1558)  RML Breath Sounds: Clear (10/09/22 1558)  RLL Breath Sounds: Diminished (10/09/22 1558)  MIGNON Breath Sounds: Clear (10/09/22 1558)  LLL Breath Sounds: Diminished (10/09/22 1558)        Events/Summary/Plan: Flutter/IS done with good effort. (10/09/22 1558)

## 2022-10-09 NOTE — TELEPHONE ENCOUNTER
[facility]   At 1152 pt is currently still inpatient at [facility] in T829. Will follow inpatient work flowsheet until pt discharges home.

## 2022-10-09 NOTE — PROGRESS NOTES
Hospital Medicine Daily Progress Note    Date of Service  10/9/2022    Chief Complaint  Shortness of breath    Hospital Course  Geena Richey is a 62 y.o. female with a history of nonsmall cell lung cancer stage IIb diagnosed 8/2022 undergoing radiation and chemotherapy.  She also has additional history of COPD, paroxysmal atrial fibrillation.  Patient was brought in with increasing shortness of breath from her primary care.  Patient was found to have bilateral pulmonary emboli with atrial fibrillation and RVR.  She was initiated on anticoagulation and a diltiazem drip.  She was admitted initially to the ICU.  Patient was initiated on amiodarone drip.  Lower extremity ultrasound was positive for left leg dvt.  Patient normally follows up with Dr. Santos Espinoza, oncology and Dr. Nicole Newell, radiation oncologist.    Interval Problem Update  10/9: Echo from yesterday reviewed and discussed with Dr Yen.  Improved right heart function. Patient now in NSR and improved HR.  Monitoring HR today per discussion with cardiology today.  Patient in agreement to plan of discharge tomorrow if all goes well and then she will go to radiation treatment thereafter.    I have discussed this patient's plan of care and discharge plan at IDT rounds today with Case Management, Nursing, Nursing leadership, and other members of the IDT team.    Consultants/Specialty  critical care    Code Status  Full Code    Disposition  Patient is not medically cleared for discharge.   Anticipate discharge to to home with close outpatient follow-up.  I have placed the appropriate orders for post-discharge needs.    Review of Systems  Review of Systems   Constitutional:  Negative for fever and malaise/fatigue.   Eyes:  Negative for discharge.   Respiratory:  Negative for cough, hemoptysis, shortness of breath and stridor.    Cardiovascular:  Negative for chest pain, palpitations and leg swelling.   Gastrointestinal:  Negative for abdominal  pain, diarrhea and nausea.   Genitourinary:  Negative for dysuria and hematuria.   Musculoskeletal:  Negative for back pain and joint pain.   Skin:  Negative for rash.   Neurological:  Negative for speech change and headaches.   Psychiatric/Behavioral:  The patient is not nervous/anxious.       Physical Exam  Temp:  [36.2 °C (97.2 °F)-36.9 °C (98.4 °F)] 36.8 °C (98.2 °F)  Pulse:  [60-78] 76  Resp:  [18] 18  BP: (120-136)/(71-80) 127/79  SpO2:  [93 %-97 %] 94 %    Physical Exam  Vitals reviewed.   Constitutional:       Appearance: Normal appearance. She is not diaphoretic.      Comments: Chemo induced alopecia   HENT:      Head: Normocephalic and atraumatic.      Nose: Nose normal.      Mouth/Throat:      Mouth: Mucous membranes are moist.      Pharynx: No oropharyngeal exudate.   Eyes:      General: No scleral icterus.        Right eye: No discharge.         Left eye: No discharge.      Extraocular Movements: Extraocular movements intact.      Conjunctiva/sclera: Conjunctivae normal.   Cardiovascular:      Rate and Rhythm: Tachycardia present. Rhythm irregular.      Pulses:           Radial pulses are 2+ on the right side and 2+ on the left side.        Dorsalis pedis pulses are 2+ on the right side and 2+ on the left side.      Heart sounds: No murmur heard.  Pulmonary:      Effort: Pulmonary effort is normal. No respiratory distress.      Breath sounds: Rhonchi present. No wheezing or rales.   Abdominal:      General: Bowel sounds are normal. There is no distension.      Palpations: Abdomen is soft.      Tenderness: There is no abdominal tenderness.   Musculoskeletal:      Cervical back: Neck supple. No tenderness. No muscular tenderness.      Right lower leg: No edema.      Left lower leg: No edema.   Skin:     Coloration: Skin is not jaundiced or pale.   Neurological:      General: No focal deficit present.      Mental Status: She is alert and oriented to person, place, and time. Mental status is at baseline.       Cranial Nerves: No cranial nerve deficit.   Psychiatric:         Mood and Affect: Mood normal.         Behavior: Behavior normal.       Fluids    Intake/Output Summary (Last 24 hours) at 10/9/2022 1730  Last data filed at 10/9/2022 1300  Gross per 24 hour   Intake 630 ml   Output 500 ml   Net 130 ml       Laboratory  Recent Labs     10/07/22  0150 10/08/22  0232 10/09/22  0658   WBC 7.4 6.9 5.7   RBC 3.07* 3.11* 3.21*   HEMOGLOBIN 9.8* 9.9* 10.2*   HEMATOCRIT 29.7* 30.4* 31.6*   MCV 96.7 97.7 98.4*   MCH 31.9 31.8 31.8   MCHC 33.0* 32.6* 32.3*   RDW 56.0* 60.3* 61.4*   PLATELETCT 279 280 267   MPV 11.8 11.7 11.4     Recent Labs     10/07/22  0150 10/08/22  0232 10/09/22  0658   SODIUM 135 138 136   POTASSIUM 4.3 4.7 4.8   CHLORIDE 101 101 102   CO2 25 25 26   GLUCOSE 171* 134* 90   BUN 23* 23* 26*   CREATININE 0.56 0.53 0.55   CALCIUM 8.5 8.3* 8.6                     Imaging  EC-ECHOCARDIOGRAM LTD W/O CONT   Final Result      DX-CHEST-PORTABLE (1 VIEW)   Final Result      1.  Stable patchy bilateral basilar areas of consolidation likely representing evolving pulmonary infarcts.   2.   Small right and probable small left pleural effusions.      IR-US GUIDED PIV   Final Result    Ultrasound-guided PERIPHERAL IV INSERTION performed by    qualified nursing staff as above.      US-EXTREMITY VENOUS LOWER BILAT   Final Result      EC-ECHOCARDIOGRAM COMPLETE W/ CONT   Final Result      CT-CTA CHEST PULMONARY ARTERY W/ RECONS   Final Result         Acute bilateral pulmonary emboli, right greater than left, as detailed above with borderline findings of right heart strain, age indeterminate.      Bilateral lower lobe peripheral consolidations with central areas of lucency, most likely related to pulmonary infarcts.      Improved aeration within the left lung with likely improving central obstructing hilar mass.      New small right pleural effusion. Small-to-moderate left pleural effusion is mildly increased.      Mild  adenopathy again noted.      Splenic infarcts again noted.      Fleischner Society pulmonary nodule recommendations:   Not applicable      These findings were discussed with LEYDA CASAS on 10/3/2022 6:30 PM.      DX-CHEST-PORTABLE (1 VIEW)   Final Result      1.  New RIGHT lung base consolidation peripherally with probable associated pleural fluid.   2.  Improvement of LEFT lung base consolidation.   3.  LEFT perihilar infiltrate or mass again seen.           Assessment/Plan  * Acute pulmonary embolism with acute cor pulmonale (HCC)- (present on admission)  Assessment & Plan  On Xarelto  Has home oxygen arranged.  Discussed with Cardiologist 10/9 and will continue current management.  Echocardiogram reviewed  Patient in no acute pulmonary distress currently  Initiate oral anticoagulation and discontinue heparin drip  10/4 Echocardiogram  CONCLUSIONS  Mildly reduced left ventricular systolic function. The ejection   fraction is measured to be 40 to 45% by Cunningham's biplane.  Dilated right ventricle with reduced right ventricular systolic   function, abnormal TAPSE 1.4 cm.  Enlarged right atrium.  Dilated inferior vena cava without inspiratory collapse.  Moderate mitral regurgitation.  Mild to moderate tricuspid regurgitation.  Mild pulmonic insufficiency.  No pericardial effusion.  Right and left fibrinous pleural effusions present.  10/8 Limited echocardiogram results:  CONCLUSIONS  Mild concentric left ventricular hypertrophy. Mildly reduced left   ventricular systolic function. The left ventricular ejection fraction   is visually estimated to be 45%.  Normal right ventricular size and reduced systolic function; TAPSE   1.2cm.    Pleural effusion  Assessment & Plan  Bilateral pulmonary embolisms with infarcts  Has known lung CA  Lasix.  On supplemental oxygen but close to baseline home oxygen needs.    Pulmonary infarct (HCC)  Assessment & Plan  Due to PE  Noted on CTA chest right chest    Pulmonary hypertension  ([facility])  Assessment & Plan  Supplemental oxygen    HFrEF (heart failure with reduced ejection fraction) ([facility])  Assessment & Plan  Rate control atrial fibrillation to help.  Given lasix overnight but no overt leg edema   CTA chest does show pleural effusion and a right lung cavitary area (likely from PE)  10/4 Echocardiogram:  CONCLUSIONS  Mildly reduced left ventricular systolic function. The ejection   fraction is measured to be 40 to 45% by Cunningham's biplane.  Dilated right ventricle with reduced right ventricular systolic   function, abnormal TAPSE 1.4 cm.  Enlarged right atrium.  Dilated inferior vena cava without inspiratory collapse.  Moderate mitral regurgitation.  Mild to moderate tricuspid regurgitation.  Mild pulmonic insufficiency.  No pericardial effusion.  Right and left fibrinous pleural effusions present.    Acute deep vein thrombosis (DVT) of distal vein of left lower extremity ([facility])  Assessment & Plan  Cancer as likely etiology  Anticoagulation. With Xarelto   10/4  LE:   Acute to subacute, occlusive thrombus in one of left paired posterior    tibial vein proximal calf and both pair at the ankle as well as one paired    of peroneal vein.   No right deep venous thrombosis.    Normocytic normochromic anemia- (present on admission)  Assessment & Plan  10/9 Hgb:10.7< 9.9 <9.8 <9.7<9.5 < 8.9  Monitor cbc    Tobacco use disorder- (present on admission)  Assessment & Plan  Cessation advised    Acute exacerbation of chronic obstructive pulmonary disease (COPD) ([facility])  Assessment & Plan  Now on prednisone  Will need inhalers for home.  Pulmonary consult appreciated  Camdeno erendira  RT per protocol  oxygen via nasal cannula normally on 3 to 3-1/2 L at home    Paroxysmal atrial fibrillation ([facility])- (present on admission)  Assessment & Plan  10/5 discontinue IV amiodarone drip and placed on oral amiodarone 400mg bid  10/8 stopped amiodarone and coreg. Added metoprolol.   Monitor vitals and on telemetry  oral  anticoagulant  10/8 normal TSH    Primary lung squamous cell carcinoma, left (HCC)- (present on admission)  Assessment & Plan  On radiation and chemotherapy    Acute hypoxemic respiratory failure (HCC)  Assessment & Plan  COPD exacerbation component along with pulmonary embolism and lung cancer with pleural effusion.  Pulmonary consulting  On baseline home oxygen.       VTE prophylaxis: therapeutic anticoagulation with Xarelto    I have performed a physical exam and reviewed and updated ROS and Plan today (10/9/2022). In review of yesterday's note (10/8/2022), there are no changes except as documented above.

## 2022-10-09 NOTE — PROGRESS NOTES
Monitor Summary  Rhythm: A.fib- A Flutter  Rate:   Ectopy: PVC, Couplet, Bigeminy, Trigeminy)  .- / .10 / .-    Pt converted to Sinus Rhythm  .18/ .08/ .40

## 2022-10-09 NOTE — DISCHARGE PLANNING
Case Management Discharge Planning    Admission Date: 10/3/2022  GMLOS: 4.1  ALOS: 6    6-Clicks ADL Score: 23  6-Clicks Mobility Score: 21      Anticipated Discharge Dispo: Discharge Disposition: D/T to facility providing skilled nursing/supportive care (04)  Discharge Address: [address] [place] [address] [place] [address]  Discharge Contact Phone Number: [phone]    DME Needed: O2    Action(s) Taken: Spoke to pt at bedside, pt states she will drive herself to her [facility] on discharge. Pt states her car is in the parking garage. Pt is on service with [facility], however, does not have portable O2 for discharge. Contacted [facility], who will deliver portable O2 for pt to discharge within 2 hours. No other case management needs    Escalations Completed: None    Medically Clear: No    Next Steps: DC to home with home O2 when medically cleared.    Barriers to Discharge: Medical clearance and Oxygen Delivery

## 2022-10-09 NOTE — PROGRESS NOTES
"Cardiology Follow-up Consult Note    Date of Service:    10/9/2022      Consulting Physician: Álvaro Ruiz D.O.    Patient ID:    Name:   Geena Richey   YOB: 1960  Age:   62 y.o.  female   MRN:   [id number]      Reason for Consultation: Atrial fibrillation    HPI/Patient ID:    Geena Richey is a 62-year-old woman with history of non-small cell cancer on chemotherapy and radiation, COPD, paroxysmal A. fib, and tobacco use who presented with shortness of breath, found to have DVT and submassive PE.  On echocardiogram patient was found to have mildly depressed LVEF as well as reduced RV function.  Cardiology is consulted for management of A. fib.    Interim Events:  No acute events overnight.    The patient reports feeling well this morning without any chest pain or shortness of breath    On telemetry, the patient remains in sinus rhythm with heart rate 60s and rare PACs/PVCs     All other review of systems reviewed and negative.      Past medical, surgical, social, and family history reviewed and unchanged from admission except as noted in assessment and plan.    Medications: Reviewed in MAR      No Known Allergies        Physical Exam  Body mass index is 34.42 kg/m².  /72   Pulse 60   Temp 36.6 °C (97.9 °F) (Temporal)   Resp 18   Ht 1.67 m (5' 5.75\")   Wt 96 kg (211 lb 10.3 oz)   SpO2 93%   Vitals:    10/09/22 0448 10/09/22 0700 10/09/22 0734 10/09/22 0817   BP: 127/75   134/72   Pulse: 75 78  60   Resp: 18 18 18 18   Temp: 36.6 °C (97.9 °F)   36.6 °C (97.9 °F)   TempSrc: Temporal   Temporal   SpO2: 95% 93% 94% 93%   Weight:       Height:         Oxygen Therapy:  Pulse Oximetry: 93 %, O2 (LPM): 3, O2 Delivery Device: Silicone Nasal Cannula    General: Well appearing and in no apparent distress  Eyes: nl conjunctiva  ENT: OP clear  Neck: JVP normal  Lungs: normal respiratory effort, CTAB  Heart: RRR, no murmurs, no rubs or gallops, no edema bilateral lower extremities. No " LV/RV heave on cardiac palpatation. + bilateral radial pulses.  + bilateral dp pulses.   Abdomen: soft, non tender, non distended, no masses, normal bowel sounds.  No HSM.  Extremities/MSK: no clubbing, no cyanosis  Neurological: No focal sensory deficits  Psychiatric: Appropriate affect, A/O x 3  Skin: Warm extremities      Labs (personally reviewed and notable for):   Lab Results   Component Value Date/Time    SODIUM 136 10/09/2022 06:58 AM    POTASSIUM 4.8 10/09/2022 06:58 AM    CHLORIDE 102 10/09/2022 06:58 AM    CO2 26 10/09/2022 06:58 AM    GLUCOSE 90 10/09/2022 06:58 AM    BUN 26 (H) 10/09/2022 06:58 AM    CREATININE 0.55 10/09/2022 06:58 AM      Lab Results   Component Value Date/Time    WBC 5.7 10/09/2022 06:58 AM    RBC 3.21 (L) 10/09/2022 06:58 AM    HEMOGLOBIN 10.2 (L) 10/09/2022 06:58 AM    HEMATOCRIT 31.6 (L) 10/09/2022 06:58 AM    MCV 98.4 (H) 10/09/2022 06:58 AM    MCH 31.8 10/09/2022 06:58 AM    MCHC 32.3 (L) 10/09/2022 06:58 AM    MPV 11.4 10/09/2022 06:58 AM    NEUTSPOLYS 76.00 (H) 10/09/2022 06:58 AM    LYMPHOCYTES 2.50 (L) 10/09/2022 06:58 AM    MONOCYTES 16.80 (H) 10/09/2022 06:58 AM    EOSINOPHILS 0.00 10/09/2022 06:58 AM    BASOPHILS 0.70 10/09/2022 06:58 AM    ANISOCYTOSIS 1+ 10/03/2022 02:19 PM        Cardiac Imaging and Procedures Review:    EKG dated 10/3/2022: Atrial fibrillation, rate 162    Limited echo 10/8/2022  CONCLUSIONS  Mild concentric left ventricular hypertrophy. Mildly reduced left   ventricular systolic function. The left ventricular ejection fraction   is visually estimated to be 45%.  Normal right ventricular size and reduced systolic function; TAPSE   1.2cm.      Impression and Medical Decision Making:  Principal Problem:    Acute pulmonary embolism with acute cor pulmonale (HCC) POA: Yes  Active Problems:    Acute hypoxemic respiratory failure (HCC) POA: Unknown    Primary lung squamous cell carcinoma, left (HCC) POA: Yes    Paroxysmal atrial fibrillation (HCC) POA:  Yes    Acute exacerbation of chronic obstructive pulmonary disease (COPD) (HCC) POA: Unknown    Tobacco use disorder POA: Yes    Normocytic normochromic anemia POA: Yes    Lactic acidosis POA: Yes    Sepsis (HCC) POA: Yes    Acute deep vein thrombosis (DVT) of distal vein of left lower extremity (HCC) POA: Unknown    HFrEF (heart failure with reduced ejection fraction) (HCC) POA: Unknown    Pulmonary hypertension (HCC) POA: Unknown    Pulmonary infarct (HCC) POA: Unknown    Pleural effusion POA: Unknown  Resolved Problems:    * No resolved hospital problems. *        Recommendations:    Paroxysmal atrial fibrillation  [id number] at least 2 (female, CHF)  -Continue systemic anticoagulation (patient also on systemic anticoagulation for PE)  -Continue metoprolol 50 mg daily, and transition to Toprol-XL on discharge for HFrEF    Mild HFrEF  Unclear etiology of HFrEF.  No prior ischemic eval.  Likely related to tachyarrhythmia.  Euvolemic on exam without diuretics.  -Transition to Toprol as above  -Start low-dose ACEI/ARB today (initiated losartan 12.5mg daily).  -Follow-up outpatient with cardiology, and repeat echocardiogram outpatient.  If LVEF remains low in sinus rhythm, can consider ischemia work-up.    Cardiology will sign off.  Thank you for allowing me to participate in the care of this patient.  Please contact me with any questions.      Dorita Guthrie MD  Cardiologist, [facility] [place]

## 2022-10-09 NOTE — TELEPHONE ENCOUNTER
RPM Notification: Disconnect and Reconnect  Actions: Device Troubleshoot    Total time (minutes) spent communicating with patient: 0    At 1250 Disconnect. Pt is still inpatient at [facility] in T829. Messaged bedside RN Mira. Let her know pt is on the monitoring program, and currently disconnected. Gave her troubleshooting steps to help get the pt reconnected for us. The bedside RN stated she worked on it and pt has a solid blue light on the monitor. I let her know I still am unable to still see her vitals. I asked if she could go to pt "[employer] Abby and check the sharing to [facility] in the [facility] for us. No response from RN from that point. I messaged the RT aide that helped set up pt for the program. I advised her that pt has been disconnected from the monitor and if she would mind going back to see the pt to help reconnect her. RT advised me they will go back to help. At 1408 Reconnect. Pt was able to get reconnected back to the monitor. Pt was disconnected for about 78 minutes.

## 2022-10-09 NOTE — PROGRESS NOTES
Assumed care of patient. Bedside report received from day RN. Patient is in bed. Fall precautions in place. Call light and belongings within reach. Updated on POC, all questions and concerns answered at this time. No other needs at this time.

## 2022-10-10 PROBLEM — J44.1 ACUTE EXACERBATION OF CHRONIC OBSTRUCTIVE PULMONARY DISEASE (COPD) (HCC): Status: RESOLVED | Noted: 2022-01-01 | Resolved: 2022-01-01

## 2022-10-10 PROBLEM — A41.9 SEPSIS (HCC): Status: RESOLVED | Noted: 2022-01-01 | Resolved: 2022-01-01

## 2022-10-10 PROBLEM — J96.01 ACUTE HYPOXEMIC RESPIRATORY FAILURE (HCC): Status: RESOLVED | Noted: 2022-01-01 | Resolved: 2022-01-01

## 2022-10-10 PROBLEM — E87.20 LACTIC ACIDOSIS: Status: RESOLVED | Noted: 2022-01-01 | Resolved: 2022-01-01

## 2022-10-10 NOTE — TELEPHONE ENCOUNTER
RPM Notification: Disconnect and Reconnect  Actions: None    Total time (minutes) spent communicating with patient: 0    At 1745 Disconnect. Pt is still inpatient so reached out to bedside RN. Let her know pt has disconnected again. RN stated pt is her next stop. At 1808 Reconnect. Pt was able to get reconnected back to the monitor.

## 2022-10-10 NOTE — PROGRESS NOTES
Discharge instructions and prescriptions given to patient. Questions and concerns addressed and answered. Heart monitor removed. Monitor room notified. IV's removed. Patient has all belongings at discharge. Down to parking lot with [facility].

## 2022-10-10 NOTE — PROGRESS NOTES
This RN attempted to call scheduling multiple times with no answer. This RN left voicemail regarding CHF appointment within seven days for pt. Awaiting call back.

## 2022-10-10 NOTE — DISCHARGE INSTRUCTIONS
Discharge Instructions per Álvaro Ruiz D.O.      DIET: Healthy balanced diet    ACTIVITY: avoid contact sports/activities while on blood thinners.  No oral NSAIDs, aspirin while on Xarelto.      DIAGNOSIS: Acute pulmonary embolism and left leg deep vein thrombosis due to lung cancer, acute respiratory distress due to COPD exacerbation, atrial fibrillation with rapid heart rate and pulmonary embolism.    Return to ER if needed            HF Patient Discharge Instructions  Monitor your weight daily, and maintain a weight chart, to track your weight changes.   Activity as tolerated, unless your Doctor has ordered otherwise. Other activity order: low salt .  Follow a low fat, low cholesterol, low salt diet unless instructed otherwise by your Doctor. Read the labels on the back of food products and track your intake of fat, cholesterol and salt.   Fluid Restriction No. If a Fluid Restriction has been ordered by your Doctor, measure fluids with a measuring cup to ensure that you are not exceeding the restriction.   No smoking.  Oxygen Yes. If your Doctor has ordered that you wear Oxygen at home, it is important to wear it as ordered.  Did you receive an explanation from staff on the importance of taking each of your medications and why it is necessary to keep taking them unless your doctor says to stop? Yes  Were all of your questions answered about how to manage your heart failure and what to do if you have increased signs and symptoms after you go home? Yes  Do you feel like your heart failure care team involved you in the care treatment plan and allowed you to make decisions regarding your care while in the hospital and addressed any discharge needs you might have? Yes    See the educational handout provided at discharge for more information on monitoring your daily weight, activity and diet. This also explains more about Heart Failure, symptoms of a flare-up and some of the tests that you have undergone.      Warning Signs of a Flare-Up include:  Swelling in the ankles or lower legs.  Shortness of breath, while at rest, or while doing normal activities.   Shortness of breath at night when in bed, or coughing in bed.   Requiring more pillows to sleep at night, or needing to sit up at night to sleep.  Feeling weak, dizzy or fatigued.     When to call your Doctor:  Call [facility] seven days a week from 8:00 a.m. to 8:00 p.m. for medical questions [phone].  Call your Primary Care Physician or Cardiologist if:   You experience any pain radiating to your jaw or neck.  You have any difficulty breathing.  You experience weight gain of 3 lbs in a day or 5 lbs in a week.   You feel any palpitations or irregular heartbeats.  You become dizzy or lose consciousness.   If you have had an angiogram or had a pacemaker or AICD placed, and experience:  Bleeding, drainage or swelling at the surgical / puncture site.  Fever greater than 100.0 F  Shock from internal defibrillator.  Cool and / or numb extremities.

## 2022-10-10 NOTE — TELEPHONE ENCOUNTER
RPM Notification: Medium Alert  Actions: None    Total time (minutes) spent communicating with patient: 1    At 0850 pt alerted with a oxygen of 87%.. called pt to see if she is ok, pt said that she is getting ready to be transported back home. Let pt know that I will keep an eye on her vitals and will call if they are out of range

## 2022-10-10 NOTE — DISCHARGE SUMMARY
"Discharge Summary    CHIEF COMPLAINT ON ADMISSION  Chief Complaint   Patient presents with    Sent by MD     Sent by Dr Grewal to get her \"lungs checked out\" increased in shortness of breath.  Right sided chest pain, cough d/t radiation.  Treatment for Lung CA.        Reason for Admission  Sent by MD     Admission Date  10/3/2022    CODE STATUS  Full Code    HPI & HOSPITAL COURSE  Geena Richey is a 62 y.o. female with a history of nonsmall cell lung cancer stage IIb diagnosed 8/2022 undergoing radiation and chemotherapy.  She also has additional history of COPD, paroxysmal atrial fibrillation.  Patient was brought in with increasing shortness of breath from her primary care.  Patient was found to have bilateral pulmonary emboli with atrial fibrillation and RVR.  She was initiated on anticoagulation and a diltiazem drip.  She was admitted initially to the ICU.  Patient was initiated on amiodarone drip.  Lower extremity ultrasound was positive for left leg dvt.  Patient normally follows up with Dr. Santos Espinoza, oncology and Dr. Nicole Newell, radiation oncologist.  Patient was initiated on Xarelto.  Risk factors on anticoagulation were discussed.  She is to avoid NSAIDs and aspirin.  Patient was at baseline oxygen needs.  She is able to ambulate on her own.  She will have follow-up with radiation oncology dated discharge and then drive home.    Therefore, she is discharged in good and stable condition to home with close outpatient follow-up.    The patient met 2-midnight criteria for an inpatient stay at the time of discharge.    Discharge Date  10/10/22    FOLLOW UP ITEMS POST DISCHARGE  None    DISCHARGE DIAGNOSES  Principal Problem:    Acute pulmonary embolism with acute cor pulmonale (HCC) POA: Yes  Active Problems:    Primary lung squamous cell carcinoma, left (HCC) POA: Yes    Paroxysmal atrial fibrillation (HCC) POA: Yes    Tobacco use disorder POA: Yes    Normocytic normochromic anemia POA: Yes    " Acute deep vein thrombosis (DVT) of distal vein of left lower extremity (HCC) POA: Unknown    HFrEF (heart failure with reduced ejection fraction) ([facility]) POA: Unknown    Pulmonary hypertension (HCC) POA: Unknown    Pulmonary infarct (HCC) POA: Unknown    Pleural effusion POA: Unknown  Resolved Problems:    Acute hypoxemic respiratory failure (HCC) POA: Unknown    Acute exacerbation of chronic obstructive pulmonary disease (COPD) ([facility]) POA: Unknown    Lactic acidosis POA: Yes    Sepsis ([facility]) POA: Yes      FOLLOW UP  Future Appointments   Date Time Provider Department Center   10/10/2022 11:15 AM RADIATION THERAPY RADT None   10/11/2022 11:30 AM RADIATION THERAPY RADT None   10/12/2022 11:30 AM RADIATION THERAPY RADT None   10/13/2022 11:30 AM RADIATION THERAPY RADT None   11/28/2022 10:30 AM Nicole Newell M.D. RADT None     Santos Espinoza  [address], [place] [address]  [phone]    Follow up      Nicole Newell M.D.  [address]  [address]  [place] [address]  [phone]            MEDICATIONS ON DISCHARGE     Medication List        START taking these medications        Instructions   atorvastatin 40 MG Tabs  Commonly known as: LIPITOR   Take 1 Tablet by mouth every evening.  Dose: 40 mg     losartan 25 MG Tabs  Start taking on: October 11, 2022  Commonly known as: COZAAR   Take 0.5 Tablets by mouth every day.  Dose: 12.5 mg     metoprolol tartrate 50 MG Tabs  Commonly known as: LOPRESSOR   Take 1 Tablet by mouth 2 times a day.  Dose: 50 mg     predniSONE 20 MG Tabs  Commonly known as: DELTASONE   Take 2 Tablets by mouth every day for 4 days.  Dose: 40 mg     * rivaroxaban 15 MG Tabs tablet  Commonly known as: XARELTO   Take 1 Tablet by mouth 2 times a day with meals for 16 days.  Dose: 15 mg     * rivaroxaban 20 MG Tabs tablet  Start taking on: October 26, 2022  Commonly known as: XARELTO   Take 1 Tablet by mouth with dinner.  Dose: 20 mg           * This list has 2 medication(s) that are  the same as other medications prescribed for you. Read the directions carefully, and ask your doctor or other care provider to review them with you.                CONTINUE taking these medications        Instructions   albuterol 108 (90 Base) MCG/ACT Aers inhalation aerosol   Inhale 2 Puffs every four hours as needed for Shortness of Breath.  Dose: 2 Puff     Symbicort 80-4.5 MCG/ACT Aero  Generic drug: budesonide-formoterol   Inhale 2 Puffs by mouth 2 times a day.  Dose: 2 Puff              Allergies  No Known Allergies    DIET  Orders Placed This Encounter   Procedures    Diet Order Diet: Level 6 - Soft and Bite Sized; Liquid level: Level 0 - Thin     Standing Status:   Standing     Number of Occurrences:   1     Order Specific Question:   Diet:     Answer:   Level 6 - Soft and Bite Sized [23]     Order Specific Question:   Liquid level     Answer:   Level 0 - Thin       ACTIVITY  As tolerated.  Weight bearing as tolerated    CONSULTATIONS  Cardiology: Elijah Brenna      PROCEDURES  None    LABORATORY  Lab Results   Component Value Date    SODIUM 136 10/09/2022    POTASSIUM 4.8 10/09/2022    CHLORIDE 102 10/09/2022    CO2 26 10/09/2022    GLUCOSE 90 10/09/2022    BUN 26 (H) 10/09/2022    CREATININE 0.55 10/09/2022        Lab Results   Component Value Date    WBC 5.7 10/09/2022    HEMOGLOBIN 10.2 (L) 10/09/2022    HEMATOCRIT 31.6 (L) 10/09/2022    PLATELETCT 267 10/09/2022      EC-ECHOCARDIOGRAM LTD W/O CONT   Final Result      DX-CHEST-PORTABLE (1 VIEW)   Final Result      1.  Stable patchy bilateral basilar areas of consolidation likely representing evolving pulmonary infarcts.   2.   Small right and probable small left pleural effusions.      IR-US GUIDED PIV   Final Result    Ultrasound-guided PERIPHERAL IV INSERTION performed by    qualified nursing staff as above.      US-EXTREMITY VENOUS LOWER BILAT   Final Result      EC-ECHOCARDIOGRAM COMPLETE W/ CONT   Final Result      CT-CTA CHEST PULMONARY ARTERY W/  RECONS   Final Result         Acute bilateral pulmonary emboli, right greater than left, as detailed above with borderline findings of right heart strain, age indeterminate.      Bilateral lower lobe peripheral consolidations with central areas of lucency, most likely related to pulmonary infarcts.      Improved aeration within the left lung with likely improving central obstructing hilar mass.      New small right pleural effusion. Small-to-moderate left pleural effusion is mildly increased.      Mild adenopathy again noted.      Splenic infarcts again noted.      Fleischner Society pulmonary nodule recommendations:   Not applicable      These findings were discussed with LEYDA CASAS on 10/3/2022 6:30 PM.      DX-CHEST-PORTABLE (1 VIEW)   Final Result      1.  New RIGHT lung base consolidation peripherally with probable associated pleural fluid.   2.  Improvement of LEFT lung base consolidation.   3.  LEFT perihilar infiltrate or mass again seen.      10/8 Echo:  CONCLUSIONS  Mild concentric left ventricular hypertrophy. Mildly reduced left ventricular systolic function. The left ventricular ejection fraction is visually estimated to be 45%.  Normal right ventricular size and reduced systolic function; TAPSE 1.2cm.    10/4/22 Echo:  CONCLUSIONS  Mildly reduced left ventricular systolic function. The ejection   fraction is measured to be 40 to 45% by Cunningham's biplane.  Dilated right ventricle with reduced right ventricular systolic   function, abnormal TAPSE 1.4 cm.  Enlarged right atrium.  Dilated inferior vena cava without inspiratory collapse.  Moderate mitral regurgitation.  Mild to moderate tricuspid regurgitation.  Mild pulmonic insufficiency.  No pericardial effusion.  Right and left fibrinous pleural effusions present.    Total time of the discharge process exceeds 32 minutes.

## 2022-10-10 NOTE — CARE PLAN
The patient is Stable - Low risk of patient condition declining or worsening    Shift Goals  Clinical Goals: monitor heart rhythm, discharge  Patient Goals: discharge  Family Goals: KHUSHBOO    Progress made toward(s) clinical / shift goals:      Problem: Knowledge Deficit - Standard  Goal: Patient and family/care givers will demonstrate understanding of plan of care, disease process/condition, diagnostic tests and medications  Outcome: Progressing     Problem: Impaired Gas Exchange  Goal: Patient will demonstrate improved ventilation and adequate oxygenation and participate in treatment regimen within the level of ability/situation.  10/9/2022 2315 by Osiris Butts R.N.  Outcome: Progressing  10/9/2022 2313 by Osiris Butts R.N.  Note: Pt dyspneic with exertion. RN discussed breathing treatments available as needed with the pt.      Problem: Self Care  Goal: Patient will have the ability to perform ADLs independently or with assistance (bathe, groom, dress, toilet and feed)  Outcome: Progressing       Patient is not progressing towards the following goals:

## 2022-10-10 NOTE — TELEPHONE ENCOUNTER
RPM Notification: Disconnect and Reconnect  Actions: None    Total time (minutes) spent communicating with patient: 0    Pt is still inpatient at [facility] in [facility].  At 1525 Disconnect. Pt disconnected from the monitor. Reached out to bedside RN about it and if she could help reconnect her for us. RN stated she would go check to give her a few. At 1655 reach out to RT since not hearing back from RN and pt is disconnected if she would mind seeing pt again to get her reconnected for us. RT stated she would go to her. At 1702 Reconnect. Pt was able to get reconnected back to the monitor. RT stated she thinks pt has cleared her [employer] and just had to resume back into the Abby and says it is connected.

## 2022-10-10 NOTE — PROGRESS NOTES
Assumed care of patient, bedside report received from Mira KUHN. Updated on POC, call light within reach and fall precautions in place. Bed locked and in lowest position. Patient instructed to call for assistance before getting out of bed. All questions answered, no other needs at this time.

## 2022-10-10 NOTE — PROGRESS NOTES
Ramirez, navigator, received information that [facility]  She went to meet with patient, since this navigator not available at the time.  Message also left for this navigator by radiation therapists reporting pt was under treatment and then ended up inpatient, not treated for a few days.  Pt had room at in paid for up until today and now has treatment until Thursday, needing lodging.    Follow up call to patient, left message.  [facility] representative not in office today to provide request for additional assistance.

## 2022-10-10 NOTE — TELEPHONE ENCOUNTER
RPM Notification: Disconnect  Actions: None    Total time (minutes) spent communicating with patient: 1      At 16:07, patient called to let us know that she got home. Asked patient if she has her oxygen on and if she was feeling well. And I asked patient if I can do her welcome call. Patient asked if I could call back later so she can get situated at home. Will csa patient in an hour.

## 2022-10-11 NOTE — TELEPHONE ENCOUNTER
"RPM Notification: Disconnect, Withdraw, and Patient Communication  Actions: None    Total time (minutes) spent communicating with patient: 8 minutes    Called pt at 730 to inform of disconnection, pt answered the phone as \"what the fuck do you want now\" I responded with \"hi youre disconnected and was wondering if you needed help reconnecting\".  Pt: nope im not reconnecting im going to bed  Me: I understand that but I need to be able to monitor you all night  Pt: well im not answering the phone at 2 o clock in the morning  Me : well if you dont answer im required to send out remsa  Pt: and whos gonna pay for resma, cause if they come and wake me up im gonna be pissed.   Me: I understand that let me try to get you reconnected.  *helps her get reconnected , she gets reconnected**  PT bf: if I turn off the phone will it disconnect her  Me: if you fully turn off the phone yes it will  PT bf: Well  [facility]  Me: Well if you do that I cant monitor her  PT bf: well let me make it more fucking clear through your skull I cant keep the fucking phone on all night we live in the [facility] no where and live off a generator I cant afford to keep the phone plugged in all night to have you fucks call me at 2 in morning. Let talk to a fucking supervisor.  Me: will do  *passed the phone over to PJ*  "

## 2022-10-11 NOTE — PROGRESS NOTES
"Late entry:  10/10/22    RIAN Banks was notified pt was recently discharged from the hospital and is requesting assistance with lodging for her remaining radiation treatments.  Met with pt in radiation consult room explained that [facility] was not available at this time and ONN would try later in the day to see if pt would qualify for further assistance.  Pt stated \"Fuck it, I'm going home\".  ONN notified Dr. Newell.    "

## 2022-10-11 NOTE — TELEPHONE ENCOUNTER
"RPM Coordinator came to me telling me the pt  called and is yelling at her because the pt is disconnected and he doesn't have a generator he can run all night.     I took the phone and immediately the patient  began to yell obscenities at me stating  He \"can't run this fucking shitty generator all night\" this RN attempted to calm the  down and told them that they should not yell at us as we are only trying to help. The patient continued to yell at which point this RN let the  know the pt would be removed from the program due to this unacceptable communication and behavior, as well as inability to be connected consistently. The  told this RN to \"fuck off and die\" and the conversation was then ended.   "

## 2022-10-12 ENCOUNTER — APPOINTMENT (OUTPATIENT)
Dept: RADIATION ONCOLOGY | Facility: MEDICAL CENTER | Age: 62
End: 2022-10-12
Attending: RADIOLOGY
Payer: MEDICAID

## 2022-10-12 LAB
CHEMOTHERAPY INFUSION START DATE: NORMAL
CHEMOTHERAPY INFUSION STOP DATE: NORMAL
CHEMOTHERAPY RECORDS: 2
CHEMOTHERAPY RECORDS: 6000
CHEMOTHERAPY RECORDS: NORMAL
CHEMOTHERAPY RX CANCER: NORMAL
DATE 1ST CHEMO CANCER: NORMAL
RAD ONC ARIA COURSE LAST TREATMENT DATE: NORMAL
RAD ONC ARIA COURSE TREATMENT ELAPSED DAYS: NORMAL
RAD ONC ARIA REFERENCE POINT DOSAGE GIVEN TO DATE: 54
RAD ONC ARIA REFERENCE POINT DOSAGE GIVEN TO DATE: 56.69
RAD ONC ARIA REFERENCE POINT ID: NORMAL
RAD ONC ARIA REFERENCE POINT ID: NORMAL

## 2022-10-12 PROCEDURE — 77336 RADIATION PHYSICS CONSULT: CPT | Performed by: RADIOLOGY

## 2022-10-12 NOTE — RADIATION COMPLETION NOTES
END OF TREATMENT SUMMARY    Patient name:  Geena Richey    Primary Physician:  Pcp Pt States None MRN: [id number]  CSN: [id number]   Referring physician:   Dr. Umaña : 1960, 62 y.o.       TREATMENT SUMMARY:        Course First Treatment Date 2022    Course Last Treatment Date 10/10/2022   Course Elapsed Days 42 @ [id number]   Course Intent Curative     Radiation Therapy Episodes       Active Episodes       Radiation Therapy: IMRT                   Radiation Treatments         Plan Last Treated On Elapsed Days Fractions Treated Prescribed Fraction Dose (cGy) Prescribed Total Dose (cGy)    L Lung,Hilum 10/10/2022 42 @ [id number] 27 of 30 200 6,000                  Reference Point Last Treated On Elapsed Days Most Recent Session Dose (cGy) Total Dose (cGy)    L Lung,Hilum 10/10/2022 42 @ [id number] 200 5,400    L Lung,Hilum CP 10/10/2022 42 @ [id number] 210 5,669                      Historical Treatments (Plans: 2)      Plan Last Treated On Elapsed Days Fractions Treated Prescribed Fraction Dose (cGy) Prescribed Total Dose (cGy)   2ndCheckPlan 2022  @  0 of 1 600 600   Cylinder 2022  @  0 of 1 600 600                Reference Point Last Treated On Elapsed Days Most Recent Session Dose (cGy) Total Dose (cGy)   Cylinder+.5 2022  @  -- 0   calc pt 2022  @  -- 0                                     STAGE:   Primary lung squamous cell carcinoma, left (HCC)  Staging form: Lung, AJCC 8th Edition  - Clinical: Stage IIB (cT2b, cN1, cM0) - Signed by Nicole Newell M.D. on 2022  Histologic grade (G): G2  Histologic grading system: 4 grade system       TREATMENT INDICATION:   Local control, cure     CONCURRENT SYSTEMIC TREATMENT:   Yes     RT COURSE DISCONTINUED EARLY:   Yes. Patient had pulmonary emboli with infarctions.  She was discharged  from the hospital and refused to come back in on Tuesday which was the day that she  at home in [place].     PATIENT  EXPERIENCE:   Toxicity Assessment 10/5/2022 2022 2022 2022 2022 2022 2022   Toxicity Assessment Chest Chest Chest Chest Chest Chest Chest   Fatigue (lethargy, malaise, asthenia) Moderate (e.g., decrease in performance status by 1 ECOG level or 20% Karn[place]sky) or causing difficulty performing some activities Moderate (e.g., decrease in performance status by 1 ECOG level or 20% [place]sky) or causing difficulty performing some activities Moderate (e.g., decrease in performance status by 1 ECOG level or 20% [place]sky) or causing difficulty performing some activities Moderate (e.g., decrease in performance status by 1 ECOG level or 20% [place]sky) or causing difficulty performing some activities Moderate (e.g., decrease in performance status by 1 ECOG level or 20% [place]sky) or causing difficulty performing some activities Increased fatigue over baseline, but not altering normal activities Moderate (e.g., decrease in performance status by 1 ECOG level or 20% Karn[place]sky) or causing difficulty performing some activities   Radiation Dermatitis None None Faint erythema or dry desquamation None None None None   Anorexia None None None None None None Loss of appetite   Dyspepsia/heartburn None None None Severe Severe None None   Dysphagia, Esophagitis, odynophagoa (painful swallowing) None None None None Mild dysphagia, but can eat regular diet None None   RT Dysphagia-Esophageal None None None None None None None   Cough Requiring narcotic antitussive Absent Absent Mild, relieved by non-prescription medication Absent Mild, relieved by non-prescription medication Mild, relieved by non-prescription medication   Dyspnea Dyspnea at normal level of activity Dyspnea at rest or requiring ventilator support Dyspnea at rest or requiring ventilator support Dyspnea at rest or requiring ventilator support Normal Dyspnea on exertion Dyspnea on exertion        FOLLOW-UP PLAN:   Patient      COMMENT:           ANATOMIC TARGET SUMMARY    ANATOMIC TARGET MODALITY TECHNIQUE   Lung and mediastinum   External beam, photons IMRT            COMMENT:         DIAGRAMS:    DOSE VOLUME HISTOGRAMS:
